# Patient Record
Sex: FEMALE | Race: WHITE | Employment: OTHER | URBAN - METROPOLITAN AREA
[De-identification: names, ages, dates, MRNs, and addresses within clinical notes are randomized per-mention and may not be internally consistent; named-entity substitution may affect disease eponyms.]

---

## 2017-01-01 ENCOUNTER — PATIENT OUTREACH (OUTPATIENT)
Dept: INTERNAL MEDICINE CLINIC | Age: 62
End: 2017-01-01

## 2017-01-01 ENCOUNTER — TELEPHONE (OUTPATIENT)
Dept: INTERNAL MEDICINE CLINIC | Age: 62
End: 2017-01-01

## 2017-01-01 DIAGNOSIS — Z93.1 S/P PERCUTANEOUS ENDOSCOPIC GASTROSTOMY (PEG) TUBE PLACEMENT (HCC): ICD-10-CM

## 2017-01-01 RX ORDER — CYCLOBENZAPRINE HCL 5 MG
5 TABLET ORAL
Qty: 25 TAB | Refills: 1 | Status: SHIPPED | OUTPATIENT
Start: 2017-01-01 | End: 2018-01-01 | Stop reason: SDUPTHER

## 2017-01-01 RX ORDER — POTASSIUM CHLORIDE 20MEQ/15ML
10 LIQUID (ML) ORAL DAILY
COMMUNITY
End: 2018-01-01 | Stop reason: ALTCHOICE

## 2017-01-01 RX ORDER — MORPHINE SULFATE ORAL SOLUTION 10 MG/5ML
SOLUTION ORAL
Qty: 100 ML | Refills: 0 | Status: SHIPPED | OUTPATIENT
Start: 2017-01-01 | End: 2018-01-01 | Stop reason: SDUPTHER

## 2017-01-12 DIAGNOSIS — Z93.1 S/P PERCUTANEOUS ENDOSCOPIC GASTROSTOMY (PEG) TUBE PLACEMENT (HCC): ICD-10-CM

## 2017-01-12 RX ORDER — CYCLOBENZAPRINE HCL 5 MG
5 TABLET ORAL
Qty: 25 TAB | Refills: 0 | Status: SHIPPED | OUTPATIENT
Start: 2017-01-12 | End: 2017-02-01 | Stop reason: SDUPTHER

## 2017-01-26 ENCOUNTER — OFFICE VISIT (OUTPATIENT)
Dept: VASCULAR SURGERY | Age: 62
End: 2017-01-26

## 2017-01-26 DIAGNOSIS — I77.1 CELIAC ARTERY STENOSIS (HCC): ICD-10-CM

## 2017-01-26 DIAGNOSIS — I77.9 BILATERAL CAROTID ARTERY DISEASE (HCC): ICD-10-CM

## 2017-01-26 NOTE — PROCEDURES
[facility]   *** FINAL REPORT ***    Name: Edita Kasper  MRN: [id number]       Outpatient  : 02 Dec 1955  HIS Order #: [id number]  [address] [facility] Visit #: [id number]  Date: 2017    TYPE OF TEST: Cerebrovascular Duplex    REASON FOR TEST  Carotid stenosis    Right Carotid:-             Proximal               Mid                 Distal  cm/s  Systolic  Diastolic  Systolic  Diastolic  Systolic  Diastolic  CCA:    594.5      45.0      112.0      28.0       79.0      26.0  Bulb:   104.0      37.0  ECA:  ICA:    110.0      38.0      273.0      94.0      373.0     106.0  ICA/CCA:  3.3       3.8    ICA Stenosis: 70% or greater    Right Vertebral:-  Finding: Occluded  Sys:  Hailey:    Right Subclavian:    Left Carotid:-            Proximal                Mid                 Distal  cm/s  Systolic  Diastolic  Systolic  Diastolic  Systolic  Diastolic  CCA:    971.6      31.0       75.0      23.0       93.0      29.0  Bulb:    96.0      34.0  ECA:     91.0      10.0  ICA:    110.0      43.0      158.0      64.0      311.0     119.0  ICA/CCA:  2.9       3.8    ICA Stenosis: 70% or greater    Left Vertebral:-  Finding: Antegrade  Sys:       61.0  Hailey:       19.0    Left Subclavian:    INTERPRETATION/FINDINGS  Duplex images were obtained using 2-D gray scale, color flow and  spectral doppler analysis. 1. Severe 70% or greater stenosis in the internal carotid arteries  bilaterally. 2. The right external carotid artery could not be visualized. 3. No significant stenosis in the left external carotid artery. 4. 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 5. Antegrade flow in the left vertebral artery. 6. The ICA/CCA ratios could be unreliable on the right due to elevated   velocities in the common carotid artery. Plaque Morphology:  1. Heterogeneous plaque in the bulb and right ICA. 2. Heterogeneous plaque in the bulb and left ICA.   Compared to the previous study on 16 there is progression of the  disease in the left ICA.    ADDITIONAL COMMENTS    I have personally reviewed the data relevant to the interpretation of  this  study. TECHNOLOGIST: Yuri Krueger RVT, ДМИТРИЙ  Signed: 01/26/2017 09:07 AM    PHYSICIAN: Monse Koehler D.O.   Signed: 01/27/2017 09:10 AM

## 2017-01-26 NOTE — PROCEDURES
[facility]   *** FINAL REPORT ***    Name: Noella Heimlich  MRN: [id number]       Outpatient  : 02 Dec 1955  HIS Order #: [id number]  [address] [facility] [place] Visit #: [id number]  Date: 2017    TYPE OF TEST: Visceral Arterial Duplex    REASON FOR TEST  Peripheral vascular dz NOS, Chronic mesenteric ischemia    Aortic PSV:  87.0 cm/s  Diameter AP:     cm   TV:     cm                   Right          Left  Renal Artery:- -------------  -------------  Proximal  PSV:  Mid       PSV:  Distal    PSV:  Aortic ratio :    Medullary PSV:            EDV:            EDR:            SDR:    Cortical  PSV:            EDV:            EDR:            SDR:  Stenosis:  Kidney size:        cm             cm               x      cm      x      cm    Hilar:-        Right          Left  Acc. Time  AT:     secs           secs  Acc. Index AI:             RI:    Mesenteric:-                  Prox   Mid   Dist Ratio Stenosis          Aneurysm                  ----- ----- ----- ----- ----------------- ------------  SMA:            387.0 251.0 179.0  4.4  Celiac:         557.0               6.4  Hepatic:        165.0               1.9  Splenic:  FIONA:  :    INTERPRETATION/FINDINGS  Duplex images were obtained using 2-D gray scale, color flow and  spectral doppler analysis. MESENTERIC:  1. Patent celiac artery stent with maximum velocity of 557c/s and no  visible narrowing or post stenotic flow. 2. Patent superior mesenteric artery with elevated velocities  throughout, no visible narrowing or post stenotic flow. 3. Patent hepatic artery at the origin. 4. The splenic and inferior mesenteric arteries were not visualized. 5. Compared to the previous study on 16 the velocities continue  to be elevated  with no significant stenosis. ADDITIONAL COMMENTS    I have personally reviewed the data relevant to the interpretation of  this  study. TECHNOLOGIST: Robin Sanabria RVT, TESSYMS  Signed: 2017 09:25 AM    PHYSICIAN: Estella Lundborg, LOBO  Signed: 01/27/2017 09:11 AM

## 2017-02-01 DIAGNOSIS — Z93.1 S/P PERCUTANEOUS ENDOSCOPIC GASTROSTOMY (PEG) TUBE PLACEMENT (HCC): ICD-10-CM

## 2017-02-01 RX ORDER — CYCLOBENZAPRINE HCL 5 MG
5 TABLET ORAL
Qty: 25 TAB | Refills: 0 | Status: SHIPPED | OUTPATIENT
Start: 2017-02-01 | End: 2017-02-24 | Stop reason: SDUPTHER

## 2017-02-06 ENCOUNTER — TELEPHONE (OUTPATIENT)
Dept: INTERNAL MEDICINE CLINIC | Age: 62
End: 2017-02-06

## 2017-02-06 NOTE — TELEPHONE ENCOUNTER
Call from pt's  req a refill on 2 Deejay HN, he is asking for 5 cans a day for 30 days , he said she normally gets 6 cans, pls send to [address] [place] [address]

## 2017-02-07 NOTE — TELEPHONE ENCOUNTER
Dr. Morgan Driscoll, they will need a written script for this to be faxed to [address].

## 2017-02-22 ENCOUNTER — TELEPHONE (OUTPATIENT)
Dept: INTERNAL MEDICINE CLINIC | Age: 62
End: 2017-02-22

## 2017-02-22 NOTE — TELEPHONE ENCOUNTER
Spoke with pt , she has an appt with a general surgeon on Friday. I told him to keep the appt with them as we would probably not be able to get her in sooner than that.

## 2017-02-22 NOTE — TELEPHONE ENCOUNTER
calling. Says wife has had a feeding tube for several years. Says it is leaking around the incision area. Wants to know what kind of doctor would take care of this?      They went to er yesterday and they looked at it but it is still leaking.,

## 2017-02-24 DIAGNOSIS — Z93.1 S/P PERCUTANEOUS ENDOSCOPIC GASTROSTOMY (PEG) TUBE PLACEMENT (HCC): ICD-10-CM

## 2017-02-24 RX ORDER — CYCLOBENZAPRINE HCL 5 MG
5 TABLET ORAL
Qty: 25 TAB | Refills: 0 | Status: SHIPPED | OUTPATIENT
Start: 2017-02-24 | End: 2017-03-22 | Stop reason: SDUPTHER

## 2017-02-24 NOTE — TELEPHONE ENCOUNTER
Patient called for   Requested Prescriptions     Pending Prescriptions Disp Refills    cyclobenzaprine (FLEXERIL) 5 mg tablet 25 Tab 0     Sig: Take 1 Tab by mouth three (3) times daily as needed for Muscle Spasm(s). Pharmacy confirmed.

## 2017-02-27 ENCOUNTER — TELEPHONE (OUTPATIENT)
Dept: INTERNAL MEDICINE CLINIC | Age: 62
End: 2017-02-27

## 2017-02-27 ENCOUNTER — HOSPITAL ENCOUNTER (OUTPATIENT)
Dept: LAB | Age: 62
Discharge: HOME | End: 2017-02-27
Payer: MEDICARE

## 2017-02-27 DIAGNOSIS — E55.9 VITAMIN D DEFICIENCY: ICD-10-CM

## 2017-02-27 DIAGNOSIS — R79.89 BLOOD CREATININE INCREASED COMPARED WITH PRIOR MEASUREMENT: ICD-10-CM

## 2017-02-27 DIAGNOSIS — E03.9 ACQUIRED HYPOTHYROIDISM: ICD-10-CM

## 2017-02-27 DIAGNOSIS — C10.9 SQUAMOUS CELL CARCINOMA OF OROPHARYNX (HCC): ICD-10-CM

## 2017-02-27 DIAGNOSIS — E83.52 HYPERCALCEMIA: ICD-10-CM

## 2017-02-27 LAB
ANION GAP BLD CALC-SCNC: 11 MMOL/L (ref 3–18)
APPEARANCE UR: CLEAR
BACTERIA URNS QL MICRO: NEGATIVE /HPF
BASOPHILS # BLD AUTO: 0.1 K/UL (ref 0–0.06)
BASOPHILS # BLD: 2 % (ref 0–2)
BILIRUB UR QL: NEGATIVE
BUN SERPL-MCNC: 10 MG/DL (ref 7–18)
BUN/CREAT SERPL: 12 (ref 12–20)
CALCIUM SERPL-MCNC: 10 MG/DL (ref 8.5–10.1)
CALCIUM SERPL-MCNC: 10.7 MG/DL (ref 8.5–10.1)
CHLORIDE SERPL-SCNC: 94 MMOL/L (ref 100–108)
CO2 SERPL-SCNC: 26 MMOL/L (ref 21–32)
COLOR UR: YELLOW
CREAT SERPL-MCNC: 0.81 MG/DL (ref 0.6–1.3)
DIFFERENTIAL METHOD BLD: ABNORMAL
EOSINOPHIL # BLD: 0.1 K/UL (ref 0–0.4)
EOSINOPHIL NFR BLD: 1 % (ref 0–5)
EPITH CASTS URNS QL MICRO: ABNORMAL /LPF (ref 0–5)
ERYTHROCYTE [DISTWIDTH] IN BLOOD BY AUTOMATED COUNT: 13.8 % (ref 11.6–14.5)
GLUCOSE SERPL-MCNC: 81 MG/DL (ref 74–99)
GLUCOSE UR STRIP.AUTO-MCNC: NEGATIVE MG/DL
HCT VFR BLD AUTO: 41.5 % (ref 35–45)
HGB BLD-MCNC: 13.1 G/DL (ref 12–16)
HGB UR QL STRIP: NEGATIVE
IRON SATN MFR SERPL: 14 %
IRON SERPL-MCNC: 54 UG/DL (ref 50–175)
KETONES UR QL STRIP.AUTO: NEGATIVE MG/DL
LEUKOCYTE ESTERASE UR QL STRIP.AUTO: NEGATIVE
LYMPHOCYTES # BLD AUTO: 4 % (ref 21–52)
LYMPHOCYTES # BLD: 0.3 K/UL (ref 0.9–3.6)
MCH RBC QN AUTO: 31.5 PG (ref 24–34)
MCHC RBC AUTO-ENTMCNC: 31.6 G/DL (ref 31–37)
MCV RBC AUTO: 99.8 FL (ref 74–97)
MONOCYTES # BLD: 0.9 K/UL (ref 0.05–1.2)
MONOCYTES NFR BLD AUTO: 13 % (ref 3–10)
NEUTS SEG # BLD: 5.8 K/UL (ref 1.8–8)
NEUTS SEG NFR BLD AUTO: 80 % (ref 40–73)
NITRITE UR QL STRIP.AUTO: NEGATIVE
PH UR STRIP: 5 [PH] (ref 5–8)
PLATELET # BLD AUTO: 380 K/UL (ref 135–420)
PMV BLD AUTO: 11.3 FL (ref 9.2–11.8)
POTASSIUM SERPL-SCNC: 4.7 MMOL/L (ref 3.5–5.5)
PROT UR STRIP-MCNC: NEGATIVE MG/DL
PTH-INTACT SERPL-MCNC: 30 PG/ML (ref 14–72)
RBC # BLD AUTO: 4.16 M/UL (ref 4.2–5.3)
RBC #/AREA URNS HPF: 0 /HPF (ref 0–5)
SODIUM SERPL-SCNC: 131 MMOL/L (ref 136–145)
SP GR UR REFRACTOMETRY: 1.03 (ref 1–1.03)
T4 FREE SERPL-MCNC: 1 NG/DL (ref 0.7–1.5)
TIBC SERPL-MCNC: 382 UG/DL (ref 250–450)
TSH SERPL DL<=0.05 MIU/L-ACNC: 0.94 UIU/ML (ref 0.36–3.74)
UROBILINOGEN UR QL STRIP.AUTO: 0.2 EU/DL (ref 0.2–1)
WBC # BLD AUTO: 7.2 K/UL (ref 4.6–13.2)
WBC URNS QL MICRO: ABNORMAL /HPF (ref 0–4)

## 2017-02-27 PROCEDURE — 80048 BASIC METABOLIC PNL TOTAL CA: CPT | Performed by: INTERNAL MEDICINE

## 2017-02-27 PROCEDURE — 83540 ASSAY OF IRON: CPT | Performed by: INTERNAL MEDICINE

## 2017-02-27 PROCEDURE — 36415 COLL VENOUS BLD VENIPUNCTURE: CPT | Performed by: INTERNAL MEDICINE

## 2017-02-27 PROCEDURE — 83970 ASSAY OF PARATHORMONE: CPT | Performed by: INTERNAL MEDICINE

## 2017-02-27 PROCEDURE — 81001 URINALYSIS AUTO W/SCOPE: CPT | Performed by: INTERNAL MEDICINE

## 2017-02-27 PROCEDURE — 84439 ASSAY OF FREE THYROXINE: CPT | Performed by: INTERNAL MEDICINE

## 2017-02-27 PROCEDURE — 82306 VITAMIN D 25 HYDROXY: CPT | Performed by: INTERNAL MEDICINE

## 2017-02-27 PROCEDURE — 85025 COMPLETE CBC W/AUTO DIFF WBC: CPT | Performed by: INTERNAL MEDICINE

## 2017-02-27 PROCEDURE — 84443 ASSAY THYROID STIM HORMONE: CPT | Performed by: INTERNAL MEDICINE

## 2017-02-27 NOTE — TELEPHONE ENCOUNTER
Patient hasn't had labs drawn for her appt with Dr Sonia Holland on Thursday. Please call to schedule.

## 2017-02-28 LAB — 25(OH)D3 SERPL-MCNC: 41.5 NG/ML (ref 30–100)

## 2017-03-02 ENCOUNTER — OFFICE VISIT (OUTPATIENT)
Dept: INTERNAL MEDICINE CLINIC | Age: 62
End: 2017-03-02

## 2017-03-02 VITALS
DIASTOLIC BLOOD PRESSURE: 82 MMHG | HEIGHT: 62 IN | OXYGEN SATURATION: 99 % | HEART RATE: 116 BPM | BODY MASS INDEX: 16.56 KG/M2 | WEIGHT: 90 LBS | TEMPERATURE: 98.3 F | SYSTOLIC BLOOD PRESSURE: 154 MMHG

## 2017-03-02 DIAGNOSIS — F17.200 CURRENT SMOKER: ICD-10-CM

## 2017-03-02 DIAGNOSIS — I77.9 BILATERAL CAROTID ARTERY DISEASE (HCC): ICD-10-CM

## 2017-03-02 DIAGNOSIS — E87.1 HYPONATREMIA: ICD-10-CM

## 2017-03-02 DIAGNOSIS — C10.9 SQUAMOUS CELL CARCINOMA OF OROPHARYNX (HCC): Primary | ICD-10-CM

## 2017-03-02 DIAGNOSIS — Z93.1 S/P PERCUTANEOUS ENDOSCOPIC GASTROSTOMY (PEG) TUBE PLACEMENT (HCC): ICD-10-CM

## 2017-03-02 DIAGNOSIS — I10 ESSENTIAL HYPERTENSION: ICD-10-CM

## 2017-03-02 DIAGNOSIS — K22.2 ESOPHAGEAL STRICTURE: ICD-10-CM

## 2017-03-02 DIAGNOSIS — M85.80 OSTEOPENIA: ICD-10-CM

## 2017-03-02 DIAGNOSIS — Z00.00 MEDICARE ANNUAL WELLNESS VISIT, SUBSEQUENT: ICD-10-CM

## 2017-03-02 DIAGNOSIS — J44.9 CHRONIC OBSTRUCTIVE PULMONARY DISEASE, UNSPECIFIED COPD TYPE (HCC): ICD-10-CM

## 2017-03-02 DIAGNOSIS — I77.1 CELIAC ARTERY STENOSIS (HCC): ICD-10-CM

## 2017-03-02 DIAGNOSIS — E83.52 HYPERCALCEMIA: ICD-10-CM

## 2017-03-02 DIAGNOSIS — M85.9 DISORDER OF BONE DENSITY AND STRUCTURE, UNSPECIFIED: ICD-10-CM

## 2017-03-02 DIAGNOSIS — E43 SEVERE PROTEIN-CALORIE MALNUTRITION (HCC): ICD-10-CM

## 2017-03-02 DIAGNOSIS — E03.9 ACQUIRED HYPOTHYROIDISM: ICD-10-CM

## 2017-03-02 DIAGNOSIS — Z71.89 ADVANCE DIRECTIVE DISCUSSED WITH PATIENT: ICD-10-CM

## 2017-03-02 DIAGNOSIS — R05.8 COUGH PRODUCTIVE OF PURULENT SPUTUM: ICD-10-CM

## 2017-03-02 DIAGNOSIS — R91.8 MULTIPLE PULMONARY NODULES: ICD-10-CM

## 2017-03-02 NOTE — PROGRESS NOTES
1. Have you been to the ER, urgent care clinic or hospitalized since your last visit? YES. [facility]- feeding tube    2. Have you seen or consulted any other health care providers outside of the [address] [place] [address] since your last visit (Include any pap smears or colon screening)? YES  Dr. Lisa Cameron, Dr. Ese Benz    Do you have an Advanced Directive? NO    Would you like information on Advanced Directives?  NO

## 2017-03-02 NOTE — PATIENT INSTRUCTIONS
Medicare Part B Preventive Services Limitations Recommendation Scheduled   Bone Mass Measurement  (age 72 & older, biennial) Requires diagnosis related to osteoporosis or estrogen deficiency. Biennial benefit unless patient has history of long-term glucocorticoid tx or baseline is needed because initial test was by other method Every 2 years or more frequently if medically necessary. Ordered       Cardiovascular Screening Blood Tests (every 5 years)  Total cholesterol, HDL, Triglycerides Order as a panel if possible Every 5 years. Done:  11/10/2015         Colorectal Cancer Screening  -Fecal occult blood test (annual)  -Flexible sigmoidoscopy (5y)  -Screening colonoscopy (10y)  -Barium Enema Colorectal cancer screening, ages 54-65. For all patients 48 and older:  -Annual fecal occult blood test or  colonoscopy every 10 years or  -Flexible sigmoidoscopy every 5 years or  -lower endoscopy to be performed more frequently, if advised by GI. Done:  1/10/2013         Counseling to Prevent Tobacco Use (up to 8 sessions per year)  - Counseling greater than 3 and up to 10 minutes  - Counseling greater than 10 minutes Patients must be asymptomatic of tobacco-related conditions to receive as preventive service Two cessation counseling attempts (up to 8 counseling sessions) per year. Current smoker   Diabetes Screening Tests (at least every 3 years, Medicare covers annually or at 6-month intervals for prediabetic patients)    Fasting blood sugar (FBS) or glucose tolerance test (GTT) Patient must be diagnosed with one of the following:  -Hypertension, Dyslipidemia, obesity, previous impaired FBS or GTT  Or any two of the following: overweight, FH of diabetes, age ? 72, history of gestational diabetes, birth of baby weighing more than 9 pounds Annually or every 6 months if previous diagnosis of elevated FBS, elevated HbA1c, or impaired GTT, or glucosuria.  Done:  11/10/2015         Diabetes Self-Management Training (DSMT) (no USPSTF recommendation) Requires referral by treating physician for patient with diabetes or renal disease. 10 hours of initial DSMT session of no less than 30 minutes each in a continuous 12-month period. 2 hours of follow-up DSMT in subsequent years. Up to 10 hours of initial training within a continuous 12 month period of subsequent years: up to 2 hours of follow-up training each year after the initial year. N/A   Glaucoma Screening (no USPSTF recommendation) Diabetes mellitus, family history, , age 48 or over,  American, age 72 or over Annually for covered beneficiaries. N/A       Human Immunodeficiency Virus (HIV) Screening (annually for increased risk patients)  HIV-1 and HIV-2 by EIA, SHANNAN, rapid antibody test, or oral mucosa transudate Patient must be at increased risk for HIV infection per USPSTF guidelines or pregnant. Tests covered annually for patients at increased risk. Pregnant patients may receive up to 3 test during pregnancy. Annually for beneficiaries at increased risk, including anyone who asks for the test. Not high risk   Medical Nutrition Therapy (MNT) (for diabetes or renal disease not recommended schedule) Requires referral by treating physician for patient with diabetes or renal disease. Can be provided in same year as diabetes self-management training (DSMT), and CMS recommends medical nutrition therapy take place after DSMT. Up to 3 hours for initial year and 2 hours in subsequent years. First year: 3 hours of one-on-one counseling or subsequent years: 2 hours.  N/A   Prostate Cancer Screening (annually up to age 76)  - Digital rectal exam (KAMALJIT)  - Prostate specific antigen (PSA) Annually (age 48 or over), KAMALJIT not paid separately when covered E/M service is provided on same date Once every 12 months for patients age older than 48years of age includes: digital rectal exam and/or prostate specific antigen test. N/A   Seasonal Influenza Vaccination (annually) Once per fall or winter season. Done:  10/13/2016         Pneumococcal Vaccination (once after 72)  Once after age 72 and if more than 5 years since last vaccination and/or uncertainty of vaccine status. Pneumococcal:  1/22/2016    Prevnar 13:  10/13/2016   Hepatitis B Vaccinations (if medium/high risk) Medium/high risk factors:  End-stage renal disease,  Hemophiliacs who received Factor VIII or IX concentrates, Clients of institutions for the mentally retarded, Persons who live in the same house as a HepB virus carrier, Homosexual men, Illicit injectable drug abusers. Schedule course of vaccines if patient not previously vaccinated  *additional shots if medically necessary. Not high risk   Screening Mammography (biennial age 54-69)? Annually (age 36 or over) Age 28 through 44: one baseline or aged 36 and older: annually. Done:  10/13/2016         Screening Pap Tests and Pelvic Examination (up to age 79 and after 79 if unknown history or abnormal study last 10 years) Every 24 months except high risk Annually if at high risk for developing cervical or vaginal cancer, or childbearing, age with abnormal Pap test within past 3 years or every 2 years for women at normal risk. Deferred to next visit. Ultrasound Screening for Abdominal Aortic Aneurysm (AAA) (once) Patient must be referred through IPPE and not have had a screening for abdominal aortic aneurysm before under Medicare. Limited to patients who meet one of the following criteria:  - Men who are 73-68 years old and have smoked more than 100 cigarettes in their lifetime.  -Anyone with a FH of AAA  -Anyone recommended for screening by USPSTF Once in a lifetime. N/A           Schedule of Personalized Health Plan  (Provide Copy to Patient)  The best way to stay healthy is to live a healthy lifestyle. A healthy lifestyle includes regular exercise, eating a well-balanced diet, keeping a healthy weight and not smoking.     Regular physical exams and screening tests are another important way to take care of yourself. Preventive exams provided by health care providers can find health problems early when treatment works best and can keep you from getting certain diseases or illnesses. Preventive services include exams, lab tests, screenings, shots, monitoring and information to help you take care of your own health. All people over 65 should have a pneumonia shot. Pneumonia shots are usually only needed once in a lifetime unless your doctor decides differently. All people over 65 should have a yearly flu shot. People over 65 are at medium to high risk for Hepatitis B. Three shots are needed for complete protection. In addition to your physical exam, some screening tests are recommended:    Bone mass measurement (dexa scan) is recommended every two years  Diabetes Mellitus screening is recommended every year. Glaucoma is an eye disease caused by high pressure in the eye. An eye exam is recommended every year. Cardiovascular screening tests that check your cholesterol and other blood fat (lipid) levels are recommended every five years. Colorectal Cancer screening tests help to find pre-cancerous polyps (growths in the colon) so they can be removed before they turn into cancer. Tests ordered for screening depend on your personal and family history risk factors.     Screening for Breast Cancer is recommended yearly with a mammogram.    Screening for Cervical Cancer is recommended every two years (annually for certain risk factors, such as previous history of STD or abnormal PAP in past 7 years), with a Pelvic Exam with PAP    Here is a list of your current Health Maintenance items with a due date:  Health Maintenance   Topic Date Due    DTaP/Tdap/Td series (1 - Tdap) 04/07/2017 (Originally 12/2/1978)    Pneumococcal 19-64 Highest Risk (3 of 3 - PCV13) 10/13/2017    MEDICARE YEARLY EXAM  03/03/2018    BREAST CANCER SCRN MAMMOGRAM  10/13/2018    PAP AKA CERVICAL CYTOLOGY  11/17/2018    COLONOSCOPY  01/10/2023    Hepatitis C Screening  Addressed    INFLUENZA AGE 9 TO ADULT  Completed          Preventing Falls: Care Instructions  Your Care Instructions  Getting around your home safely can be a challenge if you have injuries or health problems that make it easy for you to fall. Loose rugs and furniture in walkways are among the dangers for many older people who have problems walking or who have poor eyesight. People who have conditions such as arthritis, osteoporosis, or dementia also have to be careful not to fall. You can make your home safer with a few simple measures. Follow-up care is a key part of your treatment and safety. Be sure to make and go to all appointments, and call your doctor if you are having problems. It's also a good idea to know your test results and keep a list of the medicines you take. How can you care for yourself at home? Taking care of yourself  · You may get dizzy if you do not drink enough water. To prevent dehydration, drink plenty of fluids, enough so that your urine is light yellow or clear like water. Choose water and other caffeine-free clear liquids. If you have kidney, heart, or liver disease and have to limit fluids, talk with your doctor before you increase the amount of fluids you drink. · Exercise regularly to improve your strength, muscle tone, and balance. Walk if you can. Swimming may be a good choice if you cannot walk easily. · Have your vision and hearing checked each year or any time you notice a change. If you have trouble seeing and hearing, you might not be able to avoid objects and could lose your balance. · Know the side effects of the medicines you take. Ask your doctor or pharmacist whether the medicines you take can affect your balance. Sleeping pills or sedatives can affect your balance. · Limit the amount of alcohol you drink. Alcohol can impair your balance and other senses.   · Ask your doctor whether calluses or corns on your feet need to be removed. If you wear loose-fitting shoes because of calluses or corns, you can lose your balance and fall. · Talk to your doctor if you have numbness in your feet. Preventing falls at home  · Remove raised doorway thresholds, throw rugs, and clutter. Repair loose carpet or raised areas in the floor. · Move furniture and electrical cords to keep them out of walking paths. · Use nonskid floor wax, and wipe up spills right away, especially on ceramic tile floors. · If you use a walker or cane, put rubber tips on it. If you use crutches, clean the bottoms of them regularly with an abrasive pad, such as steel wool. · Keep your house well lit, especially [place], and outside walkways. Use night-lights in areas such as hallways and bathrooms. Add extra light switches or use remote switches (such as switches that go on or off when you clap your hands) to make it easier to turn lights on if you have to get up during the night. · Install sturdy handrails on stairways. · Move items in your cabinets so that the things you use a lot are on the lower shelves (about waist level). · Keep a cordless phone and a flashlight with new batteries by your bed. If possible, put a phone in each of the main rooms of your house, or carry a cell phone in case you fall and cannot reach a phone. Or, you can wear a device around your neck or wrist. You push a button that sends a signal for help. · Wear low-heeled shoes that fit well and give your feet good support. Use footwear with nonskid soles. Check the heels and soles of your shoes for wear. Repair or replace worn heels or soles. · Do not wear socks without shoes on wood floors. · Walk on the grass when the sidewalks are slippery. If you live in an area that gets snow and ice in the winter, sprinkle salt on slippery steps and sidewalks.   Preventing falls in the bath  · Install grab bars and nonskid mats inside and outside your shower or tub and near the toilet and sinks. · Use shower chairs and bath benches. · Use a hand-held shower head that will allow you to sit while showering. · Get into a tub or shower by putting the weaker leg in first. Get out of a tub or shower with your strong side first.  · Repair loose toilet seats and consider installing a raised toilet seat to make getting on and off the toilet easier. · Keep your bathroom door unlocked while you are in the shower. Where can you learn more? Go to http://dayronFastclickraj.info/. Enter [id number] in the search box to learn more about \"Preventing Falls: Care Instructions. \"  Current as of: August 4, 2016  Content Version: 11.1  © 8875-9660 [employer]. Care instructions adapted under license by [employer] (which disclaims liability or warranty for this information). If you have questions about a medical condition or this instruction, always ask your healthcare professional. [place] [address] any warranty or liability for your use of this information. Advance Directives: Care Instructions  Your Care Instructions  An advance directive is a legal way to state your wishes at the end of your life. It tells your family and your doctor what to do if you can no longer say what you want. There are two main types of advance directives. You can change them any time that your wishes change. · A living will tells your family and your doctor your wishes about life support and other treatment. · A medical power of  lets you name a person to make treatment decisions for you when you can't speak for yourself. This person is called a health care agent. If you do not have an advance directive, decisions about your medical care may be made by a doctor or a  who doesn't know you. It may help to think of an advance directive as a gift to the people who care for you.  If you have one, they won't have to make tough decisions by themselves. Follow-up care is a key part of your treatment and safety. Be sure to make and go to all appointments, and call your doctor if you are having problems. It's also a good idea to know your test results and keep a list of the medicines you take. How can you care for yourself at home? · Discuss your wishes with your loved ones and your doctor. This way, there are no surprises. · Many states have a unique form. Or you might use a universal form that has been approved by many states. This kind of form can sometimes be completed and stored online. Your electronic copy will then be available wherever you have a connection to the Internet. In most cases, doctors will respect your wishes even if you have a form from a different state. · You don't need a  to do an advance directive. But you may want to get legal advice. · Think about these questions when you prepare an advance directive:  ¨ Who do you want to make decisions about your medical care if you are not able to? Many people choose a family member, close friend, or doctor. ¨ Do you know enough about life support methods that might be used? If not, talk to your doctor so you understand. ¨ What are you most afraid of that might happen? You might be afraid of having pain, losing your independence, or being kept alive by machines. ¨ Where would you prefer to die? Choices include your home, a hospital, or a nursing home. ¨ Would you like to have information about hospice care to support you and your family? ¨ Do you want to donate organs when you die? ¨ Do you want certain [employer] practices performed before you die? If so, put your wishes in the advance directive. · Read your advance directive every year, and make changes as needed. When should you call for help? Be sure to contact your doctor if you have any questions. Where can you learn more? Go to http://dayron-raj.info/.   Enter R264 in the search box to learn more about \"Advance Directives: Care Instructions. \"  Current as of: February 24, 2016  Content Version: 11.1  © 9176-3149 [employer]. Care instructions adapted under license by [employer] (which disclaims liability or warranty for this information). If you have questions about a medical condition or this instruction, always ask your healthcare professional. [place] [address] any warranty or liability for your use of this information.

## 2017-03-02 NOTE — PROGRESS NOTES
Brandi Montemayor is a 63 y.o. female and presents for annual Medicare Wellness Visit. Patient accompanied by your , Rachele Callejas. Problem List: Reviewed with patient and discussed risk factors.     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    Anemia D64.9    Esophageal stricture K22.2    Vitamin D deficiency E55.9    Acquired hypothyroidism E03.9    Hyponatremia E87.1    Carpal tunnel syndrome, left G56.02    GI bleed K92.2    Dysphagia, 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    S/P percutaneous endoscopic gastrostomy (PEG) tube placement ([facility]) Z93.1    History of radiation to head and neck region Z92.3    Cough productive of purulent sputum R05    Hypercalcemia E83.52    Elevated serum creatinine R79.89    Current smoker F17.200       Current medical providers:  Patient Care Team:  Remington Quiroz MD as PCP - General (Internal Medicine)  Pema Celeste MD (Inactive) (Orthopedic Surgery)  Josie Haney MD (Gastroenterology)  Dominick Burgos MD (Vascular Surgery)  Guilherme Bower MD (Otolaryngology)  Molly Alicea PA-C (Physician Assistant)  Sandy Mccray MD (Gastroenterology)  [place], [place] (Vascular Surgery)  Albertina Conroy MD (Otolaryngology)  Suman Delong MD (Neurology)  Gris Holm RN as [address] (Internal Medicine)  Javid Thomas MD (Oncology)  Mallory Jasmine MD (Gastroenterology)  Jonathon Cantu MD (Surgery)  Gem Rowe MD (Ophthalmology)  Eleanor Gallagher MD (Ophthalmology)  Esau Palumbo, RN as Ambulatory Care Navigator (Internal Medicine)    PSH: Reviewed with patient  Past Surgical History:   Procedure Laterality Date    ABDOMEN SURGERY PROC UNLISTED      \"ulcer\" surgery    HX ENDOSCOPY  5/9/2014    w/ dilation    HX ENDOSCOPY  5/13/2014    w/ dilation    HX ENDOSCOPY  5/21    with Dilatation    HX ENDOSCOPY  6/4/2014    w/ dilation    HX GI      HX ORTHOPAEDIC      intramedullary chao fixation of the right tiba    HX OTHER SURGICAL      skin cancer removal from right shoulder; PEG TUBE    HX TRACHEOSTOMY          SH: Reviewed with patient  Social History   Substance Use Topics    Smoking status: Current Every Day Smoker     Packs/day: 0.50     Types: Cigarettes    Smokeless tobacco: Never Used      Comment: DENIES BUT HEAVY ODOR NOTED ON PT. THEN SPOUSE STATES OCCASSIONAL    Alcohol use 8.4 oz/week     0 Standard drinks or equivalent, 14 Cans of beer per week       FH: Reviewed with patient  Family History   Problem Relation Age of Onset    Heart Disease Mother        Medications/Allergies: Reviewed with patient  Current Outpatient Prescriptions on File Prior to Visit   Medication Sig Dispense Refill    cyclobenzaprine (FLEXERIL) 5 mg tablet Take 1 Tab by mouth three (3) times daily as needed for Muscle Spasm(s). 25 Tab 0    morphine 10 mg/5 mL oral solution 2.5ml or 5ml by peg tube every 8 hours as needed for pain 250 mL 0    aspirin 81 mg chewable tablet Take 81 mg by mouth daily.  albuterol-ipratropium (DUO-NEB) 2.5 mg-0.5 mg/3 ml nebu 3 mL by Nebulization route every four (4) hours as needed. 30 Nebule 5    levothyroxine (SYNTHROID) 25 mcg tablet Take 1.5 tablet daily 30 minutes before breakfast and medications. (Patient taking differently: 37.5 mcg. Take 1.5 tablet daily 30 minutes before breakfast and medications.) 135 Tab 5    gabapentin (NEURONTIN) 300 mg capsule Take 300 mg by mouth three (3) times daily.  diphenhydrAMINE (BENADRYL) 25 mg capsule Take 25 mg by mouth daily as needed for Itching (Taken prior to Plavix dose).       OMEPRAZOLE PO Take 10 mg by mouth two (2) times a day.  cyanocobalamin (VITAMIN B12) 500 mcg tablet Take 500 mcg by mouth daily. Via peg      lidocaine HCl-hydrocortison ac topical cream Apply  to affected area two (2) times a day.  multivitamin (THERAGRAN) liquid 10 mL daily.  potassium chloride (K-DUR, KLOR-CON) 10 mEq tablet 10 mEq by Feeding Tube route daily.  cholecalciferol, VITAMIN D3, (VITAMIN D3) 5,000 unit tab tablet Take 2,000 Units by mouth daily.  amylase-lipase-protease (CREON 86228) 12,000-38,000 -60,000 unit capsule Instill 1 Cap into tube Take As Needed (for clogged feeding tube).  fluticasone 250 mcg/actuation dsdv 1 [place].  OXYGEN-AIR DELIVERY SYSTEMS 2 L/min by Nasal route as needed. No current facility-administered medications on file prior to visit. Allergies   Allergen Reactions    Percocet [Oxycodone-Acetaminophen] Itching and Hives    Percodan [Oxycodone-Aspirin] Rash and Itching    Plavix [Clopidogrel] Rash and Hives       Objective:  Visit Vitals    /82    Pulse (!) 116    Temp 98.3 °F (36.8 °C) (Oral)    Ht 5' 2\" (1.575 m)    Wt 90 lb (40.8 kg)    SpO2 99%    BMI 16.46 kg/m2    Body mass index is 16.46 kg/(m^2). Assessment of cognitive impairment: Alert and oriented x 3    Depression Screen:   PHQ 2 / 9, over the last two weeks 3/2/2017   Little interest or pleasure in doing things Not at all   Feeling down, depressed or hopeless Not at all   Total Score PHQ 2 0       Fall Risk Assessment:  No flowsheet data found. Functional Ability:   Does the patient exhibit a steady gait? yes   How long did it take the patient to get up and walk from a sitting position? 1 second. Is the patient self reliant?  (ie can do own laundry, meals, household chores)  yes     Does the patient handle his/her own medications? yes     Does the patient handle his/her own money? yes     Is the patients home safe (ie good lighting, handrails on stairs and bath, etc.)? yes     Did you notice or did patient express any hearing difficulties? no     Did you notice or did patient express any vision difficulties?   no     Were distance and reading eye charts used? no       Advance Care Planning:   Patient was offered the opportunity to discuss advance care planning:  yes     Does patient have an Advance Directive:  no   If no, did you provide information on [facility]? yes       Plan:      Orders Placed This Encounter    DEXA BONE DENSITY STUDY AXIAL    diph,Pertuss,Acell,,Tet Vac-PF (ADACEL) 2 Lf-(2.5-5-3-5 mcg)-5Lf/0.5 mL susp       Health Maintenance   Topic Date Due    DTaP/Tdap/Td series (1 - Tdap) 04/07/2017 (Originally 12/2/1976)    Pneumococcal 19-64 Highest Risk (3 of 3 - PCV13) 10/13/2017    MEDICARE YEARLY EXAM  03/03/2018    BREAST CANCER SCRN MAMMOGRAM  10/13/2018    PAP AKA CERVICAL CYTOLOGY  11/17/2018    COLONOSCOPY  01/10/2023    Hepatitis C Screening  Addressed    INFLUENZA AGE 9 TO ADULT  Completed       *Patient verbalized understanding and agreement with the plan. A copy of the After Visit Summary with personalized health plan was given to the patient today.

## 2017-03-02 NOTE — MR AVS SNAPSHOT
Visit Information Date & Time Provider Department Dept. Phone Encounter #  
 3/2/2017 10:00 AM Andrew Dasilva MD Internist of [address] [place] [address] [phone] [id number] Follow-up Instructions Return in about 6 months (around 9/2/2017), or if symptoms worsen or fail to improve. Your Appointments 3/24/2017 10:45 AM  
Office Visit with MD J[facility] ([facility]) Appt Note: 6 MONTH FU AFTER STUDY AT [facility] ON 1/26/2017; office resched due to 9 am appt is for procedure moved appt down; 6 [facility] ON 1/26/2017 office resched due to 9 am appt is for procedure moved appt down; .  
 [address] [facility] [address] [place] [address] [address] [address]  
[phone]  
  
   
 [address] [address]  
  
    
 8/29/2017  9:35 AM  
LAB with [facility] NURSE VISIT Internist of [facility] ([facility]) Appt Note: lab  
 [address], [address] [address] [address]  
  
   
 [address], [address]  
  
    
 9/5/2017 10:00 AM  
Office Visit with Andrew Dasilva MD  
Internist of [address] [place] [address]) Appt Note: ov bs  
 [address] [facility], [address] [facility] [address] [address] [address] [facility]  
  
   
 [address], [address] Upcoming Health Maintenance Date Due DTaP/Tdap/Td series (1 - Tdap) 4/7/2017* Pneumococcal 19-64 Highest Risk (3 of 3 - PCV13) 10/13/2017 MEDICARE YEARLY EXAM 3/3/2018 BREAST CANCER SCRN MAMMOGRAM 10/13/2018 PAP AKA CERVICAL CYTOLOGY 11/17/2018 COLONOSCOPY 1/10/2023 *Topic was postponed. The date shown is not the original due date. Allergies as of 3/2/2017  Review Complete On: 3/2/2017 By: Ileene Nipple, LPN Severity Noted Reaction Type Reactions Percocet [Oxycodone-acetaminophen]    Itching, Hives Percodan [Oxycodone-aspirin]  05/21/2014    Rash, Itching Plavix [Clopidogrel]  04/10/2013    Rash, Hives Current Immunizations  Reviewed on 10/13/2016 Name Date Influenza Vaccine (Quad) PF 10/13/2016  3:26 PM, 11/17/2015  3:55 PM  
 Influenza Vaccine PF 11/4/2014, 10/21/2013  1:03 PM  
 Pneumococcal Polysaccharide (PPSV-23) 10/13/2016  3:52 PM  
  
 Not reviewed this visit You Were Diagnosed With   
  
 Codes Comments Medicare annual wellness visit, subsequent    -  Primary ICD-10-CM: Z00.00 ICD-9-CM: V70.0 Advance directive discussed with patient     ICD-10-CM: Z71.89 ICD-9-CM: V65.49 Osteopenia     ICD-10-CM: M85.80 ICD-9-CM: 733.90 Disorder of bone density and structure, unspecified     ICD-10-CM: M85.9 ICD-9-CM: 733.90 Vitals BP  
  
  
  
  
  
 154/82 BMI and BSA Data Body Mass Index Body Surface Area  
 16.46 kg/m 2 1.34 m 2 Preferred Pharmacy Pharmacy Name Phone [address] [place] [address] [phone] Your Updated Medication List  
  
   
This list is accurate as of: 3/2/17 11:02 AM.  Always use your most recent med list.  
  
  
  
  
 albuterol-ipratropium 2.5 mg-0.5 mg/3 ml Nebu Commonly known as:  DUO-NEB  
3 mL by Nebulization route every four (4) hours as needed. amylase-lipase-protease 12,000-38,000 -60,000 unit capsule Commonly known as:  CREON 04882 Instill 1 Cap into tube Take As Needed (for clogged feeding tube). aspirin 81 mg chewable tablet Take 81 mg by mouth daily. cholecalciferol (VITAMIN D3) 5,000 unit Tab tablet Commonly known as:  VITAMIN D3 Take 2,000 Units by mouth daily. cyanocobalamin 500 mcg tablet Commonly known as:  VITAMIN B12 Take 500 mcg by mouth daily. Via peg  
  
 cyclobenzaprine 5 mg tablet Commonly known as:  FLEXERIL Take 1 Tab by mouth three (3) times daily as needed for Muscle Spasm(s). diph,Pertuss(Acell),Tet Vac-PF 2 Lf-(2.5-5-3-5 mcg)-5Lf/0.5 mL susp Commonly known as:  ADACEL  
0.5 mL by IntraMUSCular route once for 1 dose. diphenhydrAMINE 25 mg capsule Commonly known as:  BENADRYL Take 25 mg by mouth daily as needed for Itching (Taken prior to Plavix dose). fluticasone 250 mcg/actuation Dsdv 1 Spray.  
  
 gabapentin 300 mg capsule Commonly known as:  NEURONTIN Take 300 mg by mouth three (3) times daily. levothyroxine 25 mcg tablet Commonly known as:  synthroid Take 1.5 tablet daily 30 minutes before breakfast and medications. lidocaine HCl-hydrocortison ac topical cream  
Apply  to affected area two (2) times a day. morphine 10 mg/5 mL oral solution 2.5ml or 5ml by peg tube every 8 hours as needed for pain  
  
 multivitamin liquid Commonly known as:  THERAGRAN  
10 mL daily. OMEPRAZOLE PO Take 10 mg by mouth two (2) times a day. OXYGEN-AIR DELIVERY SYSTEMS  
2 L/min by Nasal route as needed. potassium chloride 10 mEq tablet Commonly known as:  K-DUR, KLOR-CON 10 mEq by Feeding Tube route daily. Prescriptions Printed Refills diph,Pertuss,Acell,,Tet Vac-PF (ADACEL) 2 Lf-(2.5-5-3-5 mcg)-5Lf/0.5 mL susp 0 Si.5 mL by IntraMUSCular route once for 1 dose. Class: Print Route: IntraMUSCular Follow-up Instructions Return in about 6 months (around 2017), or if symptoms worsen or fail to improve. To-Do List   
 2017 Imaging:  DEXA BONE DENSITY STUDY AXIAL Patient Instructions Medicare Part B Preventive Services Limitations Recommendation Scheduled Bone Mass Measurement 
(age 72 & older, biennial) Requires diagnosis related to osteoporosis or estrogen deficiency. Biennial benefit unless patient has history of long-term glucocorticoid tx or baseline is needed because initial test was by other method Every 2 years or more frequently if medically necessary. Ordered Cardiovascular Screening Blood Tests (every 5 years) Total cholesterol, HDL, Triglycerides Order as a panel if possible Every 5 years. Done: 
11/10/2015 Colorectal Cancer Screening 
-Fecal occult blood test (annual) -Flexible sigmoidoscopy (5y) 
-Screening colonoscopy (10y) -Barium Enema Colorectal cancer screening, ages 54-65. For all patients 48 and older: 
-Annual fecal occult blood test or 
colonoscopy every 10 years or 
-Flexible sigmoidoscopy every 5 years or 
-lower endoscopy to be performed more frequently, if advised by GI. Done: 
1/10/2013 Counseling to Prevent Tobacco Use (up to 8 sessions per year) - Counseling greater than 3 and up to 10 minutes - Counseling greater than 10 minutes Patients must be asymptomatic of tobacco-related conditions to receive as preventive service Two cessation counseling attempts (up to 8 counseling sessions) per year. Current smoker Diabetes Screening Tests (at least every 3 years, Medicare covers annually or at 6-month intervals for prediabetic patients) Fasting blood sugar (FBS) or glucose tolerance test (GTT) Patient must be diagnosed with one of the following: 
-Hypertension, Dyslipidemia, obesity, previous impaired FBS or GTT 
Or any two of the following: overweight, FH of diabetes, age ? 72, history of gestational diabetes, birth of baby weighing more than 9 pounds Annually or every 6 months if previous diagnosis of elevated FBS, elevated HbA1c, or impaired GTT, or glucosuria. Done: 
11/10/2015 Diabetes Self-Management Training (DSMT) (no USPSTF recommendation) Requires referral by treating physician for patient with diabetes or renal disease. 10 hours of initial DSMT session of no less than 30 minutes each in a continuous 12-month period. 2 hours of follow-up DSMT in subsequent years. Up to 10 hours of initial training within a continuous 12 month period of subsequent years: up to 2 hours of follow-up training each year after the initial year.   N/A  
 Glaucoma Screening (no USPSTF recommendation) Diabetes mellitus, family history, , age 48 or over,  American, age 72 or over Annually for covered beneficiaries. N/A Human Immunodeficiency Virus (HIV) Screening (annually for increased risk patients) HIV-1 and HIV-2 by EIA, SHANNAN, rapid antibody test, or oral mucosa transudate Patient must be at increased risk for HIV infection per USPSTF guidelines or pregnant. Tests covered annually for patients at increased risk. Pregnant patients may receive up to 3 test during pregnancy. Annually for beneficiaries at increased risk, including anyone who asks for the test. Not high risk Medical Nutrition Therapy (MNT) (for diabetes or renal disease not recommended schedule) Requires referral by treating physician for patient with diabetes or renal disease. Can be provided in same year as diabetes self-management training (DSMT), and CMS recommends medical nutrition therapy take place after DSMT. Up to 3 hours for initial year and 2 hours in subsequent years. First year: 3 hours of one-on-one counseling or subsequent years: 2 hours. N/A Prostate Cancer Screening (annually up to age 76) - Digital rectal exam (KAMALJIT) - Prostate specific antigen (PSA) Annually (age 48 or over), KAMALJIT not paid separately when covered E/M service is provided on same date Once every 12 months for patients age older than 48years of age includes: digital rectal exam and/or prostate specific antigen test. N/A Seasonal Influenza Vaccination (annually)  Once per fall or winter season. Done: 
10/13/2016 Pneumococcal Vaccination (once after 72)  Once after age 72 and if more than 5 years since last vaccination and/or uncertainty of vaccine status. Pneumococcal: 
1/22/2016 Prevnar 13: 
10/13/2016 Hepatitis B Vaccinations (if medium/high risk) Medium/high risk factors:  End-stage renal disease, 
 Hemophiliacs who received Factor VIII or IX concentrates, Clients of institutions for the mentally retarded, Persons who live in the same house as a HepB virus carrier, Homosexual men, Illicit injectable drug abusers. Schedule course of vaccines if patient not previously vaccinated *additional shots if medically necessary. Not high risk Screening Mammography (biennial age 54-69)? Annually (age 36 or over) Age 28 through 44: one baseline or aged 36 and older: annually. Done: 
10/13/2016 Screening Pap Tests and Pelvic Examination (up to age 79 and after 79 if unknown history or abnormal study last 10 years) Every 24 months except high risk Annually if at high risk for developing cervical or vaginal cancer, or childbearing, age with abnormal Pap test within past 3 years or every 2 years for women at normal risk. Deferred to next visit. Ultrasound Screening for Abdominal Aortic Aneurysm (AAA) (once) Patient must be referred through IPPE and not have had a screening for abdominal aortic aneurysm before under Medicare. Limited to patients who meet one of the following criteria: 
- Men who are 73-68 years old and have smoked more than 100 cigarettes in their lifetime. 
-Anyone with a FH of AAA 
-Anyone recommended for screening by USPSTF Once in a lifetime. N/A Schedule of Personalized Health Plan (Provide Copy to Patient) The best way to stay healthy is to live a healthy lifestyle. A healthy lifestyle includes regular exercise, eating a well-balanced diet, keeping a healthy weight and not smoking. Regular physical exams and screening tests are another important way to take care of yourself. Preventive exams provided by health care providers can find health problems early when treatment works best and can keep you from getting certain diseases or illnesses.  Preventive services include exams, lab tests, screenings, shots, monitoring and information to help you take care of your own health. All people over 65 should have a pneumonia shot. Pneumonia shots are usually only needed once in a lifetime unless your doctor decides differently. All people over 65 should have a yearly flu shot. People over 65 are at medium to high risk for Hepatitis B. Three shots are needed for complete protection. In addition to your physical exam, some screening tests are recommended: 
 
Bone mass measurement (dexa scan) is recommended every two years Diabetes Mellitus screening is recommended every year. Glaucoma is an eye disease caused by high pressure in the eye. An eye exam is recommended every year. Cardiovascular screening tests that check your cholesterol and other blood fat (lipid) levels are recommended every five years. Colorectal Cancer screening tests help to find pre-cancerous polyps (growths in the colon) so they can be removed before they turn into cancer. Tests ordered for screening depend on your personal and family history risk factors. Screening for Breast Cancer is recommended yearly with a mammogram. 
 
Screening for Cervical Cancer is recommended every two years (annually for certain risk factors, such as previous history of STD or abnormal PAP in past 7 years), with a Pelvic Exam with PAP Here is a list of your current Health Maintenance items with a due date: 
Health Maintenance Topic Date Due  
 DTaP/Tdap/Td series (1 - Tdap) 04/07/2017 (Originally [birth year]/2/1978)  Pneumococcal 19-64 Highest Risk (3 of 3 - PCV13) 10/13/2017  MEDICARE YEARLY EXAM  03/03/2018  BREAST CANCER SCRN MAMMOGRAM  10/13/2018  PAP AKA CERVICAL CYTOLOGY  11/17/2018  COLONOSCOPY  01/10/2023  Hepatitis C Screening  Addressed  INFLUENZA AGE 9 TO ADULT  Completed Preventing Falls: Care Instructions Your Care Instructions Getting around your home safely can be a challenge if you have injuries or health problems that make it easy for you to fall. Loose rugs and furniture in walkways are among the dangers for many older people who have problems walking or who have poor eyesight. People who have conditions such as arthritis, osteoporosis, or dementia also have to be careful not to fall. You can make your home safer with a few simple measures. Follow-up care is a key part of your treatment and safety. Be sure to make and go to all appointments, and call your doctor if you are having problems. It's also a good idea to know your test results and keep a list of the medicines you take. How can you care for yourself at home? Taking care of yourself · You may get dizzy if you do not drink enough water. To prevent dehydration, drink plenty of fluids, enough so that your urine is light yellow or clear like water. Choose water and other caffeine-free clear liquids. If you have kidney, heart, or liver disease and have to limit fluids, talk with your doctor before you increase the amount of fluids you drink. · Exercise regularly to improve your strength, muscle tone, and balance. Walk if you can. Swimming may be a good choice if you cannot walk easily. · Have your vision and hearing checked each year or any time you notice a change. If you have trouble seeing and hearing, you might not be able to avoid objects and could lose your balance. · Know the side effects of the medicines you take. Ask your doctor or pharmacist whether the medicines you take can affect your balance. Sleeping pills or sedatives can affect your balance. · Limit the amount of alcohol you drink. Alcohol can impair your balance and other senses. · Ask your doctor whether calluses or corns on your feet need to be removed. If you wear loose-fitting shoes because of calluses or corns, you can lose your balance and fall. · Talk to your doctor if you have numbness in your feet. Preventing falls at home · Remove raised doorway thresholds, throw rugs, and clutter. Repair loose carpet or raised areas in the floor. · Move furniture and electrical cords to keep them out of walking paths. · Use nonskid floor wax, and wipe up spills right away, especially on ceramic tile floors. · If you use a walker or cane, put rubber tips on it. If you use crutches, clean the bottoms of them regularly with an abrasive pad, such as steel wool. · Keep your house well lit, especially [place], and outside walkways. Use night-lights in areas such as hallways and bathrooms. Add extra light switches or use remote switches (such as switches that go on or off when you clap your hands) to make it easier to turn lights on if you have to get up during the night. · Install sturdy handrails on stairways. · Move items in your cabinets so that the things you use a lot are on the lower shelves (about waist level). · Keep a cordless phone and a flashlight with new batteries by your bed. If possible, put a phone in each of the main rooms of your house, or carry a cell phone in case you fall and cannot reach a phone. Or, you can wear a device around your neck or wrist. You push a button that sends a signal for help. · Wear low-heeled shoes that fit well and give your feet good support. Use footwear with nonskid soles. Check the heels and soles of your shoes for wear. Repair or replace worn heels or soles. · Do not wear socks without shoes on wood floors. · Walk on the grass when the sidewalks are slippery. If you live in an area that gets snow and ice in the winter, sprinkle salt on slippery steps and sidewalks. Preventing falls in the bath · Install grab bars and nonskid mats inside and outside your shower or tub and near the toilet and sinks. · Use shower chairs and bath benches. · Use a hand-held shower head that will allow you to sit while showering.  
· Get into a tub or shower by putting the weaker leg in first. Get out of a tub or shower with your strong side first. 
· Repair loose toilet seats and consider installing a raised toilet seat to make getting on and off the toilet easier. · Keep your bathroom door unlocked while you are in the shower. Where can you learn more? Go to http://dayron-raj.info/. Enter [id number] in the search box to learn more about \"Preventing Falls: Care Instructions. \" Current as of: August 4, 2016 Content Version: 11.1 © 3275-9853 [employer]. Care instructions adapted under license by [employer] (which disclaims liability or warranty for this information). If you have questions about a medical condition or this instruction, always ask your healthcare professional. [address] any warranty or liability for your use of this information. Advance Directives: Care Instructions Your Care Instructions An advance directive is a legal way to state your wishes at the end of your life. It tells your family and your doctor what to do if you can no longer say what you want. There are two main types of advance directives. You can change them any time that your wishes change. · A living will tells your family and your doctor your wishes about life support and other treatment. · A medical power of  lets you name a person to make treatment decisions for you when you can't speak for yourself. This person is called a health care agent. If you do not have an advance directive, decisions about your medical care may be made by a doctor or a  who doesn't know you. It may help to think of an advance directive as a gift to the people who care for you. If you have one, they won't have to make tough decisions by themselves. Follow-up care is a key part of your treatment and safety. Be sure to make and go to all appointments, and call your doctor if you are having problems.  It's also a good idea to know your test results and keep a list of the medicines you take. How can you care for yourself at home? · Discuss your wishes with your loved ones and your doctor. This way, there are no surprises. · Many states have a unique form. Or you might use a universal form that has been approved by many states. This kind of form can sometimes be completed and stored online. Your electronic copy will then be available wherever you have a connection to the Internet. In most cases, doctors will respect your wishes even if you have a form from a different state. · You don't need a  to do an advance directive. But you may want to get legal advice. · Think about these questions when you prepare an advance directive: ¨ Who do you want to make decisions about your medical care if you are not able to? Many people choose a family member, close friend, or doctor. ¨ Do you know enough about life support methods that might be used? If not, talk to your doctor so you understand. ¨ What are you most afraid of that might happen? You might be afraid of having pain, losing your independence, or being kept alive by machines. ¨ Where would you prefer to die? Choices include your home, a hospital, or a nursing home. ¨ Would you like to have information about hospice care to support you and your family? ¨ Do you want to donate organs when you die? ¨ Do you want certain Adventism practices performed before you die? If so, put your wishes in the advance directive. · Read your advance directive every year, and make changes as needed. When should you call for help? Be sure to contact your doctor if you have any questions. Where can you learn more? Go to http://dayron-raj.info/. Enter R264 in the search box to learn more about \"Advance Directives: Care Instructions. \" Current as of: February 24, 2016 Content Version: 11.1 © 7363-8764 [employer].  Care instructions adapted under license by [address] (which disclaims liability or warranty for this information). If you have questions about a medical condition or this instruction, always ask your healthcare professional. [address] any warranty or liability for your use of this information. Introducing [facility]! Yonas Ma introduces Express Fit patient portal. Now you can access parts of your medical record, email your doctor's office, and request medication refills online. 1. In your internet browser, go to [email or web address] 2. Click on the First Time User? Click Here link in the Sign In box. You will see the New Member Sign Up page. 3. Enter your Express Fit Access Code exactly as it appears below. You will not need to use this code after youve completed the sign-up process. If you do not sign up before the expiration date, you must request a new code. · Express Fit Access Code: [email or web address] Expires: 4/26/2017  7:45 AM 
 
4. Enter the last four digits of your Social Security Number (xxxx) and Date of Birth (mm/dd/yyyy) as indicated and click Submit. You will be taken to the next sign-up page. 5. Create a Express Fit ID. This will be your Express Fit login ID and cannot be changed, so think of one that is secure and easy to remember. 6. Create a Express Fit password. You can change your password at any time. 7. Enter your Password Reset Question and Answer. This can be used at a later time if you forget your password. 8. Enter your e-mail address. You will receive e-mail notification when new information is available in [address] 9. Click Sign Up. You can now view and download portions of your medical record. 10. Click the Download Summary menu link to download a portable copy of your medical information. If you have questions, please visit the Frequently Asked Questions section of the Express Fit website.  Remember, Express Fit is NOT to be used for urgent needs. For medical emergencies, dial 911. Now available from your iPhone and Android! Please provide this summary of care documentation to your next provider. Your primary care clinician is listed as Vince Goldberg. If you have any questions after today's visit, please call [phone].

## 2017-03-05 PROBLEM — I10 ESSENTIAL HYPERTENSION: Status: ACTIVE | Noted: 2017-03-05

## 2017-03-05 PROBLEM — R91.8 MULTIPLE PULMONARY NODULES: Status: ACTIVE | Noted: 2017-03-05

## 2017-03-05 RX ORDER — LEVOTHYROXINE SODIUM 25 UG/1
TABLET ORAL
Qty: 135 TAB | Refills: 5
Start: 2017-03-05 | End: 2017-04-13

## 2017-03-05 NOTE — PROGRESS NOTES
HPI:   Cheyenne Richard is a 63y.o. year old female who presents today for evaluation of squamous cell carcinoma of the oropharynx, COPD, hypertension, PAD, carotid artery disease, mesenteric ischemia, pulmonary nodules, chronic headaches, and carpal tunnel syndrome. She reports that she is doing relatively well. She is complaining of cough productive of green sputum for one week, but denies any fevers, chills, wheezing, or shortness of breath. She has been using Mucinex with good control of symptoms. She reluctantly admits that she continues to smoke,  although is unwilling to quantify the amount. She has had multiple recent ED visits for leaking around her G-tube, and on 2/24/2017, she underwent placement of a larger tube by TALHA Simon, which she states has solved the problem. She is otherwise without complaints. 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 She underwent panendoscopy with biopsy, PEG tube placement and tracheostomy on 5/13/2013. She was subsequently treated with radiation and chemotherapy (Taxol and Cisplatin), completed 8/6/2013. She has had no clinical evidence of recurrent disease, with negative serial PET scans/ CT scans of the chest and neck. A follow-up PET scan was obtained in 2/4/2016 which revealed no tumor activity in the head or neck, but multifocal patchy nodular activity in the lungs (right > left), could be inflammatory although could also be consistent with metastases. A chest CT scan was obtained 2/26/16 revealing new bilateral pulmonary nodules, also consistent with inflammation vs. metastases. She had a repeat chest CT scan on 6/21/2016, which showed that all of the lung nodular densities had improved in appearance and decreased in size, with no new or enlarging pulmonary nodules or enlarged lymph nodes demonstrated.  She also had a neck CT scan (6/21/2016) which was negative for enlarged cervical lymph nodes, but there was an amorphous soft tissue density diffusely within the parapharyngeal fat, which was most likely post-therapy change rather than neoplastic recurrence since there was no discrete masslike focal accumulation of tissue. She had a repeat PET scan (1/21/2017) which showed marked interval improvement of multifocal hypermetabolic lesions since 9/3065, with only a mild hypermetabolic focus in the right pretracheal mediastinum, probably correlating to a tiny lymph node, clinically reactive. She also had a repeat CT scan of the neck (1/30/2017) showing no residual or recurrent mass, and CT scan of the chest (1/30/2017) showing stable right pulmonary nodules. She is being followed by Dr. Grant Lna [facility]) and Dr. Shawna King (oncology). Following her XRT and chemotherapy treatment, the tracheostomy was successfully removed. Her course has been complicated by esophageal stricture and aspiration, requiring G tube placement. She underwent multiple endoscopic dilatations under fluoroscopy using rendezvous techniques (10/2014) with reestablishment of continuity with her upper and lower esophagus. However, it was noted that she was having significant aspiration with both solids and liquids, due to poor laryngeal mobility from epiglottic and laryngeal scarring s/p XRT. In 11/29/2015, she was found to have a posterior left upper lobe cavitary mass on chest CT scan. She was admitted to [facility] and underwent bronchoscopy and bronchoalveolar lavage, which revealed no endobronchial obstruction or nodules and pathology was negative for malignant cells. Her QuantiFERON Gold was negative for TB. Cultures were positive for MSSA and pseudomonas and she was diagnosed with a left upper lobe abscess, thought to be secondary to aspiration. She was discharged home on 12/9/2015 with a PICC line and was treated with nafcillin and meropenem.  She was again hospitalized at [facility] on 12/26/2015 when she presented with melena and leakage from PEG site and was found to have a Hb of 6.1. She was transfused and due to persistent leakage, had her G tube converted to a G-J tube by interventional radiology. She had severe hypokalemia, which required multiple IV/ per tube dosing. She underwent endoscopy by Dr. Malathi Conner, but he was only able to pass the scope into the pharynx since the upper esophageal sphincter was completely fused shut. ENT was consulted about possibly attempting to open the proximal esophagus, and it was felt that the risk of perforation and mediastinal infection was too high, especially since she had adequete enteral access for nutrition. She was admitted to [facility] from 1/8/2016 to 1/22/2016 for a recurrent upper G-I bleed (Hb 6.8 requiring 4 U of PRBCs), acute hypoxic respiratory failure with interstitial pulmonary edema and bilateral pleural effusions, and candidemia (most likely from PICC line). She had an echocardiogram (1/15/2016) which showed normal LV size and function (EF 65%). Thoracentesis revealed effusions were transudative. She was treated with fluconazole, and had her G-J tube converted back to a G-Tube. She had been relatively stable since discharge, on continuous feedings through her G-tube. She reports that she continues to have difficulty with her G-tube cracking or becoming loose and falling out, and she has visited the ED multiple times over the last several months to have her G-tube replaced. She also has a history of peripheral vascular disease and presented in 2/2013 with weight loss and post-prandial pain. 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 Ryanne Ambrose. She again represented with abdominal complaints in 4/2013 and was thought to have stent restenosis since she was not taking clopidogrel due to itching. In 5/2013, she underwent a celiac arteriogram which showed that the stent was patent.  In 6/2015 and 6/2016, an abdominal duplex scan showed >70% stenosis of the celiac artery, but she remains asymptomatic. In 6/21/2016, surveillance neck CT scan showed high grade bilateral internal carotid arterial stenoses. A carotid duplex scan (6/24/2016) confirmed severe (>70%) right internal carotid artery stenosis and moderate (50-69%) left internal carotid artery stenosis. On 7/20/2016, she underwent a subclavian, cerebral, vertebral, and carotid arteriogram by Dr. Kendall Mata, which revealed multiple areas of atherosclerosis, but no significant stenoses; the right internal carotid artery had a 50% narrowing noted while all other vessels were patent. She continues to be maintained on aspirin. According to the chart, she has a history of hepatitis C and cirrhosis. However, review of records show negative hepatitis B and C panels in 3/2014 at [facility], and CT scans of abdomen show a normal liver in 5/9/2015 and 11/9/2015. She had a screening colonoscopy in 1/2013 by Dr. Toshia Zhou which was normal.     She has a history of hypertension that was treated in the past with lisinopril. She no longer requires medication. She also has a history of hypothyroidism and is on Synthroid. Denies any cold intolerance, hair or skin changes. She has a history of osteopenia diagnosed in 2010, with DEXA showing T-scores: femoral neck  left -2.2 / right -2.4, and lumbar -0.5. She was treated with alendronate for one year, but she discontinued it due to throat irritation. She continues to take calcium and Vitamin D. She has no history of pathologic fractures. She has a history of suspected COPD, with a long history of smoking 1-2 ppd. She was being treated with Flovent and albuterol-ipratropium (Duo-neb) nebulizers; however, she states that she has not needed to use these recently. She denies any shortness of breath currently. She is being followed by Dr. Donal Garcia for complaints of daily headaches and numbness and tingling in her left hand.  She is being treated with gabapentin and was instructed to use a wrist splint for probable carpal tunnel syndrome. Past Medical History:   Diagnosis Date    Cigarette smoker     COPD [facility]     Esophageal stricture 2014    s/p XRT for oropharyngeal cancer; requiring GJ tube for nutrition.  Hypertension     Hypothyroidism     Melanoma ([facility]     Mesenteric ischemia ([facility] [facility]     Oropharyngeal cancer ([facility] [address] 5/2013    [id number] squamous cell carcinoma of posterion pharynx and tonsil s/p XRT and chemo.  Osteopenia     Panic disorder     Peptic ulcer disease     Stenosis of celiac artery ([facility] [address] 2/2013    s/p stent    Vitamin D deficiency      Past Surgical History:   Procedure Laterality Date    ABDOMEN SURGERY PROC UNLISTED      \"ulcer\" surgery    HX ENDOSCOPY  5/9/2014    w/ dilation    HX ENDOSCOPY  5/13/2014    w/ dilation    HX ENDOSCOPY  5/21    with Dilatation    HX ENDOSCOPY  6/4/2014    w/ dilation    HX GI      HX ORTHOPAEDIC      intramedullary chao fixation of the right tiba    HX OTHER SURGICAL      skin cancer removal from right shoulder; PEG TUBE    HX TRACHEOSTOMY       Current Outpatient Prescriptions   Medication Sig    levothyroxine (SYNTHROID) 25 mcg tablet Take 1.5 tablet daily 30 minutes before breakfast and medications.  cyclobenzaprine (FLEXERIL) 5 mg tablet Take 1 Tab by mouth three (3) times daily as needed for Muscle Spasm(s).  morphine 10 mg/5 mL oral solution 2.5ml or 5ml by peg tube every 8 hours as needed for pain    aspirin 81 mg chewable tablet Take 81 mg by mouth daily.  albuterol-ipratropium (DUO-NEB) 2.5 mg-0.5 mg/3 ml nebu 3 mL by Nebulization route every four (4) hours as needed.  gabapentin (NEURONTIN) 300 mg capsule Take 300 mg by mouth three (3) times daily.  diphenhydrAMINE (BENADRYL) 25 mg capsule Take 25 mg by mouth daily as needed for Itching (Taken prior to Plavix dose).  OMEPRAZOLE PO Take 10 mg by mouth two (2) times a day.     cyanocobalamin (VITAMIN B12) 500 mcg tablet Take 500 mcg by mouth daily. Via peg    lidocaine HCl-hydrocortison ac topical cream Apply  to affected area two (2) times a day.  multivitamin (THERAGRAN) liquid 10 mL daily.  potassium chloride (K-DUR, KLOR-CON) 10 mEq tablet 10 mEq by Feeding Tube route daily.  cholecalciferol, VITAMIN D3, (VITAMIN D3) 5,000 unit tab tablet Take 2,000 Units by mouth daily.  amylase-lipase-protease (CREON 50059) 12,000-38,000 -60,000 unit capsule Instill 1 Cap into tube Take As Needed (for clogged feeding tube).  fluticasone 250 mcg/actuation dsdv 1 [place].  OXYGEN-AIR DELIVERY SYSTEMS 2 L/min by Nasal route as needed. No current facility-administered medications for this visit. Allergies and Intolerances: Allergies   Allergen Reactions    Percocet [Oxycodone-Acetaminophen] Itching and Hives    Percodan [Oxycodone-Aspirin] Rash and Itching    Plavix [Clopidogrel] Rash and Hives     Family History: No FH of breast or colon cancer. Sister had uterine cancer. Family History   Problem Relation Age of Onset    Heart Disease Mother      Social History: She is  living with her . She has no children. She retired in 2013 from being the  at the [employer]. She  reports that she has been smoking Cigarettes. She has been smoking about 0.50 packs per day. She has never used smokeless tobacco.   History   Alcohol Use    8.4 oz/week    0 Standard drinks or equivalent, 14 Cans of beer per week     Immunization History:   Immunization History   Administered Date(s) Administered    Influenza Vaccine (Quad) PF 11/17/2015, 10/13/2016    Influenza Vaccine PF 10/21/2013, 11/04/2014    Pneumococcal Polysaccharide (PPSV-23) 10/13/2016       Review of Systems:   As above included in HPI.   Otherwise 11 point review of systems negative including constitutional, skin, HENT, eyes, respiratory, cardiovascular, gastrointestinal, genitourinary, musculoskeletal, endo/heme/aller, neurological.    Physical:   Vitals:   BP: 154/82  HR: (!) 116  WT: 90 lb (40.8 kg)  BMI:  17.52 kg/m2    Exam:   Pt appears well; alert and oriented x 3; appropriate affect. HEENT: PERRLA, anicteric, oropharynx clear, no JVD, adenopathy or thyromegaly. No carotid bruits or radiated murmur. Lungs: clear to auscultation, no wheezes, rhonchi, or rales. Heart: regular rate and rhythm. No murmur, rubs, gallops  Abdomen: soft, nontender, nondistended, normal bowel sounds, no hepatosplenomegaly or masses. Extremities: without edema. Pulses 1-2+ bilaterally. Review of Data:  Labs:  Hospital Outpatient Visit on 02/27/2017   Component Date Value Ref Range Status    WBC 02/27/2017 7.2  4.6 - 13.2 K/uL Final    RBC 02/27/2017 4.16* 4.20 - 5.30 M/uL Final    HGB 02/27/2017 13.1  12.0 - 16.0 g/dL Final    HCT 02/27/2017 41.5  35.0 - 45.0 % Final    MCV 02/27/2017 99.8* 74.0 - 97.0 FL Final    MCH 02/27/2017 31.5  24.0 - 34.0 PG Final    MCHC 02/27/2017 31.6  31.0 - 37.0 g/dL Final    RDW 02/27/2017 13.8  11.6 - 14.5 % Final    PLATELET 02/66/0834 751  135 - 420 K/uL Final    MPV 02/27/2017 11.3  9.2 - 11.8 FL Final    NEUTROPHILS 02/27/2017 80* 40 - 73 % Final    LYMPHOCYTES 02/27/2017 4* 21 - 52 % Final    MONOCYTES 02/27/2017 13* 3 - 10 % Final    EOSINOPHILS 02/27/2017 1  0 - 5 % Final    BASOPHILS 02/27/2017 2  0 - 2 % Final    ABS. NEUTROPHILS 02/27/2017 5.8  1.8 - 8.0 K/UL Final    ABS. LYMPHOCYTES 02/27/2017 0.3* 0.9 - 3.6 K/UL Final    ABS. MONOCYTES 02/27/2017 0.9  0.05 - 1.2 K/UL Final    ABS. EOSINOPHILS 02/27/2017 0.1  0.0 - 0.4 K/UL Final    ABS.  BASOPHILS 02/27/2017 0.1* 0.0 - 0.06 K/UL Final    DF 02/27/2017 AUTOMATED    Final    Sodium 02/27/2017 131* 136 - 145 mmol/L Final    Potassium 02/27/2017 4.7  3.5 - 5.5 mmol/L Final    Chloride 02/27/2017 94* 100 - 108 mmol/L Final    CO2 02/27/2017 26  21 - 32 mmol/L Final    Anion gap 02/27/2017 11 3.0 - 18 mmol/L Final    Glucose 02/27/2017 81  74 - 99 mg/dL Final    BUN 02/27/2017 10  7.0 - 18 MG/DL Final    Creatinine 02/27/2017 0.81  0.6 - 1.3 MG/DL Final    BUN/Creatinine ratio 02/27/2017 12  12 - 20   Final    GFR est AA 02/27/2017 >60  >60 ml/min/1.73m2 Final    GFR est non-AA 02/27/2017 >60  >60 ml/min/1.73m2 Final    Calcium 02/27/2017 10.0  8.5 - 10.1 MG/DL Final    TSH 02/27/2017 0.94  0.36 - 3.74 uIU/mL Final    T4, Free 02/27/2017 1.0  0.7 - 1.5 NG/DL Final    Vitamin D 25-Hydroxy 02/27/2017 41.5  30 - 100 ng/mL Final    Iron 02/27/2017 54  50 - 175 ug/dL Final    TIBC 02/27/2017 382  250 - 450 ug/dL Final    Iron % saturation 02/27/2017 14  % Final    Calcium 02/27/2017 10.7* 8.5 - 10.1 MG/DL Final    PTH, Intact 02/27/2017 30.0  14.0 - 72.0 pg/mL Final    Color 02/27/2017 YELLOW    Final    Appearance 02/27/2017 CLEAR    Final    Specific gravity 02/27/2017 1.035* 1.003 - 1.030   Final    pH (UA) 02/27/2017 5.0  5.0 - 8.0   Final    Protein 02/27/2017 NEGATIVE   NEG mg/dL Final    Glucose 02/27/2017 NEGATIVE   NEG mg/dL Final    Ketone 02/27/2017 NEGATIVE   NEG mg/dL Final    Bilirubin 02/27/2017 NEGATIVE   NEG   Final    Blood 02/27/2017 NEGATIVE   NEG   Final    Urobilinogen 02/27/2017 0.2  0.2 - 1.0 EU/dL Final    Nitrites 02/27/2017 NEGATIVE   NEG   Final    Leukocyte Esterase 02/27/2017 NEGATIVE   NEG   Final    WBC 02/27/2017 0 to 3  0 - 4 /hpf Final    RBC 02/27/2017 0  0 - 5 /hpf Final    Epithelial cells 02/27/2017 FEW  0 - 5 /lpf Final    Bacteria 02/27/2017 NEGATIVE   NEG /hpf Final       Imaging: none    Health Maintenance:  Screening:    Mammogram: negative (10/2016)   PAP smear: s/p PRASHANTH. No further screening. Colorectal: colonoscopy (1/2013) normal. Dr. Charlette Duran. Due 2023.    Depression: none   DM (HbA1c/FPG): FPG 81 (2/2017)   Hepatitis C: negative (3/2014) at [address]: none   DEXA: osteopenia (12/2010)   Smoking: resumed smoking 0.5 to 1 ppd   Vitamin D: 41.5 (2/2017)   Medicare Wellness: today      Impression:  Patient Active Problem List   Diagnosis Code    Melanoma right scapula C43.9    COPD (chronic obstructive pulmonary disease) ([facility] [address] J44.9    Carotid artery disease ([facility] [address] bilateral moderate I77.9    Celiac artery stenosis, status post stent I77.4    Severe protein-calorie malnutrition ([facility] [address] E43    Squamous cell carcinoma of oropharynx, with PEG tube and tracheostomy C10.9    Anemia D64.9    Esophageal stricture K22.2    Vitamin D deficiency E55.9    Acquired hypothyroidism E03.9    Hyponatremia E87.1    Carpal tunnel syndrome, left G56.02    GI bleed K92.2    Dysphagia, 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    S/P percutaneous endoscopic gastrostomy (PEG) tube placement (HCC) Z93.1    History of radiation to head and neck region Z92.3    Cough productive of purulent sputum R05    Hypercalcemia E83.52    Current smoker F17.200    Essential hypertension I10    Multiple pulmonary nodules R91.8       Plan:  1. Productive cough. Present several days, but productive of scant green sputum. No increase in shortness of breath. Patient does not feel that she needs antibiotics currently. Continue with symptomatic treatment with Mucinex. If worsens, instructed to call office. 2. Hyponatremia. Mild and has been present intermittently since at least 2015. Remains stable with no worsening. May be related to solute concentration of tube feeds or excess free water intake. Continue to follow. 4. Hypercalcemia. Mildly elevated since 3/2016. Repeat with PTH level normal. Follow. 5. Oropharyngeal carcinoma s/p XRT/chemo. Currently stable. Recent PET scan and neck/chest CT scans with stable pulmonary nodules, and otherwise without clinical evidence of active disease. She is being followed by Dr. Holly Shanks at [facility] and Dr. Maia Dumont. 6. Esophageal stricture. Currently receiving all nutrition via G-tube. On intermittent feeds.  Weight decreased five pounds since last visit. Discussed caloric intake and need for increase. Will offer dietician consultation if continues to decline. Continue to follow. 7. Lung abscess. Chest CT scan (6/21/2016) showed that the previously evident cavitary area within the lateral left chest had resolved with only a residual linear density without a cavitary component remaining. Follow. 8. Iron deficiency anemia. Resolved. Was secondary to g-i loss. No longer taking iron supplement. Hb/Hct remain stable. Follow closely. 9. Hypothyroidism. TSH at therapeutic goal between 0.5 and 5.0 on lower dose at 37.5 mcg of Synthroid. Continue to follow. 10. Osteopenia. Last bone density scan 12/2010. Using femoral neck T-scores, calculated FRAX score estimates her 10 year risk of a major osteoporetic fracture at 6.6% and hip fracture at 1.3%, which are not an indication for biphosphonate treatment (>20% and >3%, respectively). Continue calcium and Vitamin D. Encouraged exercise, particularly weight bearing activities. Severe vitamin D deficiency in 11/2015, and treated with 5000 U daily. Repeat now normal. Instructed to decrease Vitamin D supplement to 2000 U daily. Patient agreeable to have a repeat bone density study. Will order. 11. H/O mesenteric ischemia. 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 Continue to follow. 12. COPD. Currently well controlled. Oon Flovent Diskus but reports not needing to use duoneb nebulizers. Followed by Dr. Ravin Sy. Continue to follow. 13. Carpal tunnel syndrome/ headaches. Being followed by Dr. Giovana Lerner. On gabapentin and using a left arm splint. Follow. 14. Smoking cessation. Discussed at length with the patient, including benefits of improved lung function as well as decreased risk of cardiovascular disease and cancer. Medication and non-pharmacologic options explored. She is not currently interested in cessation, and reluctant to quantify smoking.  Total time spent on topic 5 minutes. 15. Health maintenance. Already received influenza vaccine. Received Pneumovax at last visit. Given script for Tdap. Vitamin D level now normal. To continue maintenance dose supplement. Mammogram up to date. Colorectal cancer screening normal in 1/2013. Follow. Total time: 40 minutes spent with the patient in face-to-face consultation of which greater than 50% was spent on counseling, answering questions and/or coordination of care. Complex medical review and management performed. In addition, an annual Medicare wellness visit was done today. Reviewed and agree with assessment. Patient understands recommendations and agrees with plan. Follow-up in 6 months.

## 2017-03-05 NOTE — PROGRESS NOTES
Advance Care Planning (ACP) Provider Note - Comprehensive     Date of ACP Conversation: 03/02/2017  Persons included in Conversation:  patient  Length of ACP Conversation in minutes:  16 minutes    Authorized Decision Maker (if patient is incapable of making informed decisions):   This person is:  Healthcare Agent/Medical Power of  under Advance Directive          General ACP for ALL Patients with Decision Making Capacity:   Importance of advance care planning, including choosing a healthcare agent to communicate patient's healthcare decisions if patient lost the ability to make decisions, such as after a sudden illness or accident  Understanding of the healthcare agent role was assessed and information provided  Exploration of values, goals, and preferences if recovery is not expected, even with continued medical treatment in the event of: Imminent death  Severe, permanent brain injury  \"In these circumstances, what matters most to you? \"  Care focused more on comfort or quality of life. Review of Existing Advance Directive:  Patient has not completed an advance directive or living will. Discussed with both the patient and her . Expressed eagerness to complete paperwork so as to have it placed on file. For Serious or Chronic Illness:  Understanding of medical condition      Interventions Provided:  Recommended completion of Advance Directive form after review of ACP materials and conversation with prospective healthcare agent   Recommended communicating the plan and making copies for the healthcare agent, personal physician, and others as appropriate (e.g., health system)  Recommended review of completed ACP document annually or upon change in health status   Recommended to complete advance directive and return completed form to office to be copied and scanned into chart. Paperwork reviewed and provided to patient.

## 2017-03-08 ENCOUNTER — TELEPHONE (OUTPATIENT)
Dept: INTERNAL MEDICINE CLINIC | Age: 62
End: 2017-03-08

## 2017-03-08 RX ORDER — AMOXICILLIN AND CLAVULANATE POTASSIUM 875; 125 MG/1; MG/1
1 TABLET, FILM COATED ORAL 2 TIMES DAILY
Qty: 20 TAB | Refills: 0 | Status: ON HOLD | OUTPATIENT
Start: 2017-03-08 | End: 2017-04-07 | Stop reason: ALTCHOICE

## 2017-03-08 NOTE — TELEPHONE ENCOUNTER
PT seen for sinus infection last week- she was told if she wasn't any better to call and she would prescribe meds-  She is having coughing-coughs up green mucous, it gets stuck in her throat due to a feeding tube,, No fever.  Please advise

## 2017-03-22 DIAGNOSIS — Z93.1 S/P PERCUTANEOUS ENDOSCOPIC GASTROSTOMY (PEG) TUBE PLACEMENT (HCC): ICD-10-CM

## 2017-03-22 RX ORDER — CYCLOBENZAPRINE HCL 5 MG
5 TABLET ORAL
Qty: 25 TAB | Refills: 0 | Status: SHIPPED | OUTPATIENT
Start: 2017-03-22 | End: 2017-05-11 | Stop reason: SDUPTHER

## 2017-03-27 DIAGNOSIS — Z93.1 S/P PERCUTANEOUS ENDOSCOPIC GASTROSTOMY (PEG) TUBE PLACEMENT (HCC): ICD-10-CM

## 2017-03-27 RX ORDER — MORPHINE SULFATE ORAL SOLUTION 10 MG/5ML
SOLUTION ORAL
Qty: 250 ML | Refills: 0 | Status: ON HOLD | OUTPATIENT
Start: 2017-03-27 | End: 2017-04-09

## 2017-03-27 NOTE — TELEPHONE ENCOUNTER
Reviewed report generated by the [facility]. Does not demonstrate aberrancies or inconsistencies with regard to the prescribing of controlled medications to this patient by other providers.   Last filled 12/2/2016

## 2017-03-29 ENCOUNTER — HOSPITAL ENCOUNTER (OUTPATIENT)
Dept: BONE DENSITY | Age: 62
Discharge: HOME | End: 2017-03-29
Attending: INTERNAL MEDICINE
Payer: MEDICARE

## 2017-03-29 DIAGNOSIS — M85.80 OSTEOPENIA: ICD-10-CM

## 2017-03-29 DIAGNOSIS — M85.9 DISORDER OF BONE DENSITY AND STRUCTURE, UNSPECIFIED: ICD-10-CM

## 2017-03-29 PROCEDURE — 77080 DXA BONE DENSITY AXIAL: CPT

## 2017-04-05 ENCOUNTER — TELEPHONE (OUTPATIENT)
Dept: INTERNAL MEDICINE CLINIC | Age: 62
End: 2017-04-05

## 2017-04-05 DIAGNOSIS — C10.9 SQUAMOUS CELL CARCINOMA OF OROPHARYNX (HCC): Primary | ICD-10-CM

## 2017-04-05 DIAGNOSIS — M81.0 OSTEOPOROSIS WITHOUT CURRENT PATHOLOGICAL FRACTURE, UNSPECIFIED OSTEOPOROSIS TYPE: ICD-10-CM

## 2017-04-05 NOTE — TELEPHONE ENCOUNTER
Reviewed bone density study from 3/29/2017 showing T-scores:  femoral neck left -3.0  /right -2.4 and lumbar -0.9 consistent with osteoporosis. This represents significant worsening from study in 2010. Given worsening, would recommend that she begin therapy with a biphosphonate. Called and discussed with . Patient with prior head and neck irradiation for oropharyngeal cancer. Unclear if using biphosphonate would be advisable given increased risk of jaw osteonecrosis. Will refer to Dr. Alexey Luu to address best therapy to manage her osteoporosis.  agreeable.

## 2017-04-07 PROBLEM — S72.113A CLOSED AVULSION FRACTURE OF GREATER TROCHANTER OF FEMUR (HCC): Status: ACTIVE | Noted: 2017-04-07

## 2017-04-09 PROBLEM — R56.9 SEIZURE (HCC): Status: ACTIVE | Noted: 2017-04-09

## 2017-04-14 ENCOUNTER — PATIENT OUTREACH (OUTPATIENT)
Dept: INTERNAL MEDICINE CLINIC | Age: 62
End: 2017-04-14

## 2017-04-14 NOTE — PROGRESS NOTES
NNTOCIP  Patient admitted to [facility] on 4/7/2017 to 4/9/2017 for right hip closed fracture post fall. Patient readmitted on 4/9/2017 to 4/13/2017 to [facility] for seizure. Patient transferred to  [facility] for continuum of care. Introduced self, role and reason for call. Spoke with Lorenza Spears LPN at the facility; verified two patient identifiers and requested she contacts me when the patient is discharged to schedule a follow appointment with Dr. Romeo Bird. Home medication reconciliation completed and allergies reviewed. Contact information was provided for future reference or further questions. Patient presenting symptoms:   4/7-4/9/2017: fall, right hip pain  4/9-4/13/2017: seizures     DME:  Rolling walker    Plan of care:  1. Take medications as prescribed  2. Attend all follow up appointments  3. [facility]   4. PT/OT  5.  Twocal HN 1 can TID with prosource    Advance Care Planning:  Not on file    Utilization in the past 12 months:  2 hospitalization and 6 ED    RRAT score:   18

## 2017-04-17 ENCOUNTER — TELEPHONE (OUTPATIENT)
Dept: INTERNAL MEDICINE CLINIC | Age: 62
End: 2017-04-17

## 2017-04-17 NOTE — TELEPHONE ENCOUNTER
Pt's  called and stated tht pt fell on 04/07/17 and she broke her leg, he called 911 and they transported her to [facility], she was released on 04/9/17, right after she got home 3 hours later she had a seizure and they came back and took her back to the hospital, she was in ICU , they released her on 04/13/17 to [facility] for rehab, he would like to spk to Dr. Kimi Tristan, he can be reached at [phone]

## 2017-04-28 ENCOUNTER — PATIENT OUTREACH (OUTPATIENT)
Dept: INTERNAL MEDICINE CLINIC | Age: 62
End: 2017-04-28

## 2017-04-28 NOTE — PROGRESS NOTES
Cary at [facility] stated the patient was discharged 4/25/2017. Nurse Navigator attempted to contact patient post discharge. Left message for the patient to contact the office on the answering machine.

## 2017-05-01 ENCOUNTER — TELEPHONE (OUTPATIENT)
Dept: INTERNAL MEDICINE CLINIC | Age: 62
End: 2017-05-01

## 2017-05-01 ENCOUNTER — PATIENT OUTREACH (OUTPATIENT)
Dept: INTERNAL MEDICINE CLINIC | Age: 62
End: 2017-05-01

## 2017-05-01 NOTE — TELEPHONE ENCOUNTER
Bakari Calle from [facility] called, he said that he is there to do a home PT eval and her pulse is 111. The pt says she feels fine, she is just nervous about starting PT again.  He had to call to let us know, all other vitals are normal. He will accept her for home PT for about 6 weeks

## 2017-05-01 NOTE — PROGRESS NOTES
NNTOCIP Contact made: within 2 business days post discharge    Patient admitted to [facility] on 4/7/2017 to 4/9/2017 for right hip closed fracture post fall. Patient readmitted on 4/9/2017 to 4/13/2017 to [facility] for seizure. Patient transferred to  [facility] for continuum of care; 4/13/2017 to 4/25/2017. Conversation with Mr. Trudi Davidson the patients spouse whose listed on the [address]; verified two patient identifiers. Introduced self, role and reason for call. Home medication reconciliation completed and allergies reviewed. Patient presenting symptoms:   4/7-4/9/2017: fall, right hip pain  4/9-4/13/2017: seizures     Mr. Trudi Davidson reports:  [facility] SN/PT came out today, SN weekly and PT twice a week. OT out today or tomorrow for evaluation  Patient is tolerating 2 cans of Twocal HN daily  No skin break down to report  Patient ambulating with a rolling walker     Barriers:   Financial: None identified at this time   Patients comprehension of disease: Patient verbalizes understanding of discharge plan and special follow up. Transportation: Spouse    Resources:  Spouse  [facility]     DME:  Rolling walker     ADL's:  Patient requires assistance with ADL's    Plan of care:  1. Take medications as prescribed  2. Attend all follow up appointments  3. Fall risk  4. Skin breakdown  5. [facility]  6. Twocal HN 1 can TID with prosource    Adherence to previous treatment and likelihood for follow up:  Mr. Skylar Fields understanding of discharge plan and special follow up. Appointments:  5/17/2017 at 56 with Dr. Saldana Mas:  Not on file    Utilization in the past 12 months:  2 hospitalization and 6 ED    RRAT score:   18    Mr. Miguel verbalizes understanding self-management of medications, and when to seek medical attention from PCP.  Mr. Trudi Davidson instructed to bring all medications with them to their next appointment. Mr. Rosanna Yip was given the opportunity to ask questions. Contact information was provided for future reference or further questions.

## 2017-05-08 ENCOUNTER — TELEPHONE (OUTPATIENT)
Dept: INTERNAL MEDICINE CLINIC | Age: 62
End: 2017-05-08

## 2017-05-08 DIAGNOSIS — G40.909 SEIZURE DISORDER (HCC): Primary | ICD-10-CM

## 2017-05-08 NOTE — TELEPHONE ENCOUNTER
She presented in 4/2017 to [facility] in status epilepticus and was treated with Keppra. She needs to remain on this medication with neurology follow-up. Chart states 100 mg/ml with dose 10 ml via G-tube BID. Please confirm with patient's  that this is correct dose and will send order to [facility]. Also, please confirm that she is scheduled for neurology follow-up. Thank you.

## 2017-05-08 NOTE — TELEPHONE ENCOUNTER
Reorder placed for Keppra. Could not be e-prescribed. Please call in. Regarding neurology referral, would prefer if patient sees Dr. Raphael Stuart or Dr. Kenneth Ayala. Please see if they would be willing and I will place referral.    Thank you.

## 2017-05-08 NOTE — TELEPHONE ENCOUNTER
Spoke with patients , she is taking 100mg/ml, 10 ml per G Tube BID. Please send to [facility]YANNA Pope. He said the pt went from the hospital to [facility] and was not given a neurology follow up. She saw Dr [facility] in the past for headaches, in 2015. Did you want her to go back to them? Her  said that Dr Elayne Quintero location is ok with them.

## 2017-05-08 NOTE — TELEPHONE ENCOUNTER
Pt was put on Keppra in hospital.  asking if this is a medication she needs to continue?  If so they need refill to [facility]

## 2017-05-10 NOTE — TELEPHONE ENCOUNTER
[address] [facility], need to know if she is okay with referral to Neurology to see either Dr. Linette Chen or Dr. Maria D Hooper.

## 2017-05-11 DIAGNOSIS — Z93.1 S/P PERCUTANEOUS ENDOSCOPIC GASTROSTOMY (PEG) TUBE PLACEMENT (HCC): ICD-10-CM

## 2017-05-11 RX ORDER — CYCLOBENZAPRINE HCL 5 MG
5 TABLET ORAL
Qty: 25 TAB | Refills: 0 | Status: SHIPPED | OUTPATIENT
Start: 2017-05-11 | End: 2017-06-05 | Stop reason: SDUPTHER

## 2017-05-17 ENCOUNTER — HOSPITAL ENCOUNTER (OUTPATIENT)
Dept: LAB | Age: 62
Discharge: HOME | End: 2017-05-17
Payer: MEDICARE

## 2017-05-17 ENCOUNTER — OFFICE VISIT (OUTPATIENT)
Dept: INTERNAL MEDICINE CLINIC | Age: 62
End: 2017-05-17

## 2017-05-17 VITALS
TEMPERATURE: 98.2 F | SYSTOLIC BLOOD PRESSURE: 130 MMHG | OXYGEN SATURATION: 95 % | HEART RATE: 116 BPM | HEIGHT: 62 IN | BODY MASS INDEX: 15.79 KG/M2 | DIASTOLIC BLOOD PRESSURE: 70 MMHG | WEIGHT: 85.8 LBS

## 2017-05-17 DIAGNOSIS — S72.111S: ICD-10-CM

## 2017-05-17 DIAGNOSIS — E03.9 ACQUIRED HYPOTHYROIDISM: ICD-10-CM

## 2017-05-17 DIAGNOSIS — I10 ESSENTIAL HYPERTENSION: ICD-10-CM

## 2017-05-17 DIAGNOSIS — K22.2 ESOPHAGEAL STRICTURE: ICD-10-CM

## 2017-05-17 DIAGNOSIS — J44.9 CHRONIC OBSTRUCTIVE PULMONARY DISEASE, UNSPECIFIED COPD TYPE (HCC): ICD-10-CM

## 2017-05-17 DIAGNOSIS — D50.9 IRON DEFICIENCY ANEMIA, UNSPECIFIED IRON DEFICIENCY ANEMIA TYPE: ICD-10-CM

## 2017-05-17 DIAGNOSIS — E43 SEVERE PROTEIN-CALORIE MALNUTRITION (HCC): ICD-10-CM

## 2017-05-17 DIAGNOSIS — E87.1 HYPONATREMIA: ICD-10-CM

## 2017-05-17 DIAGNOSIS — C10.9 SQUAMOUS CELL CARCINOMA OF OROPHARYNX (HCC): ICD-10-CM

## 2017-05-17 DIAGNOSIS — R13.13 DYSPHAGIA, CRICOPHARYNGEAL: ICD-10-CM

## 2017-05-17 DIAGNOSIS — E55.9 VITAMIN D DEFICIENCY: ICD-10-CM

## 2017-05-17 DIAGNOSIS — D50.9 IRON DEFICIENCY ANEMIA, UNSPECIFIED IRON DEFICIENCY ANEMIA TYPE: Primary | ICD-10-CM

## 2017-05-17 DIAGNOSIS — R56.9 SEIZURES (HCC): ICD-10-CM

## 2017-05-17 DIAGNOSIS — R91.8 MULTIPLE PULMONARY NODULES: ICD-10-CM

## 2017-05-17 DIAGNOSIS — I77.9 BILATERAL CAROTID ARTERY DISEASE (HCC): ICD-10-CM

## 2017-05-17 DIAGNOSIS — M80.00XS AGE-RELATED OSTEOPOROSIS WITH CURRENT PATHOLOGICAL FRACTURE, SEQUELA: ICD-10-CM

## 2017-05-17 DIAGNOSIS — F17.200 CURRENT SMOKER: ICD-10-CM

## 2017-05-17 LAB
ALBUMIN SERPL BCP-MCNC: 3.4 G/DL (ref 3.4–5)
ALBUMIN/GLOB SERPL: 0.8 {RATIO} (ref 0.8–1.7)
ALP SERPL-CCNC: 176 U/L (ref 45–117)
ALT SERPL-CCNC: 12 U/L (ref 13–56)
ANION GAP BLD CALC-SCNC: 16 MMOL/L (ref 3–18)
AST SERPL W P-5'-P-CCNC: 24 U/L (ref 15–37)
BASOPHILS # BLD AUTO: 0.1 K/UL (ref 0–0.06)
BASOPHILS # BLD: 1 % (ref 0–2)
BILIRUB SERPL-MCNC: 0.7 MG/DL (ref 0.2–1)
BUN SERPL-MCNC: 9 MG/DL (ref 7–18)
BUN/CREAT SERPL: 12 (ref 12–20)
CALCIUM SERPL-MCNC: 9.9 MG/DL (ref 8.5–10.1)
CHLORIDE SERPL-SCNC: 96 MMOL/L (ref 100–108)
CO2 SERPL-SCNC: 23 MMOL/L (ref 21–32)
CREAT SERPL-MCNC: 0.73 MG/DL (ref 0.6–1.3)
DIFFERENTIAL METHOD BLD: ABNORMAL
EOSINOPHIL # BLD: 0.1 K/UL (ref 0–0.4)
EOSINOPHIL NFR BLD: 1 % (ref 0–5)
ERYTHROCYTE [DISTWIDTH] IN BLOOD BY AUTOMATED COUNT: 18.2 % (ref 11.6–14.5)
FERRITIN SERPL-MCNC: 108 NG/ML (ref 8–388)
GLOBULIN SER CALC-MCNC: 4.2 G/DL (ref 2–4)
GLUCOSE SERPL-MCNC: 71 MG/DL (ref 74–99)
HCT VFR BLD AUTO: 36 % (ref 35–45)
HGB BLD-MCNC: 11.3 G/DL (ref 12–16)
IRON SATN MFR SERPL: 20 %
IRON SERPL-MCNC: 78 UG/DL (ref 50–175)
LYMPHOCYTES # BLD AUTO: 6 % (ref 21–52)
LYMPHOCYTES # BLD: 0.6 K/UL (ref 0.9–3.6)
MCH RBC QN AUTO: 29.5 PG (ref 24–34)
MCHC RBC AUTO-ENTMCNC: 31.4 G/DL (ref 31–37)
MCV RBC AUTO: 94 FL (ref 74–97)
MONOCYTES # BLD: 0.8 K/UL (ref 0.05–1.2)
MONOCYTES NFR BLD AUTO: 10 % (ref 3–10)
NEUTS SEG # BLD: 7.2 K/UL (ref 1.8–8)
NEUTS SEG NFR BLD AUTO: 82 % (ref 40–73)
PLATELET # BLD AUTO: 297 K/UL (ref 135–420)
PMV BLD AUTO: 12 FL (ref 9.2–11.8)
POTASSIUM SERPL-SCNC: 3.9 MMOL/L (ref 3.5–5.5)
PROT SERPL-MCNC: 7.6 G/DL (ref 6.4–8.2)
RBC # BLD AUTO: 3.83 M/UL (ref 4.2–5.3)
SODIUM SERPL-SCNC: 135 MMOL/L (ref 136–145)
TIBC SERPL-MCNC: 391 UG/DL (ref 250–450)
TSH SERPL DL<=0.05 MIU/L-ACNC: 2.46 UIU/ML (ref 0.36–3.74)
WBC # BLD AUTO: 8.7 K/UL (ref 4.6–13.2)

## 2017-05-17 PROCEDURE — 80053 COMPREHEN METABOLIC PANEL: CPT | Performed by: INTERNAL MEDICINE

## 2017-05-17 PROCEDURE — 85025 COMPLETE CBC W/AUTO DIFF WBC: CPT | Performed by: INTERNAL MEDICINE

## 2017-05-17 PROCEDURE — 82306 VITAMIN D 25 HYDROXY: CPT | Performed by: INTERNAL MEDICINE

## 2017-05-17 PROCEDURE — 84443 ASSAY THYROID STIM HORMONE: CPT | Performed by: INTERNAL MEDICINE

## 2017-05-17 PROCEDURE — 82728 ASSAY OF FERRITIN: CPT | Performed by: INTERNAL MEDICINE

## 2017-05-17 PROCEDURE — 36415 COLL VENOUS BLD VENIPUNCTURE: CPT | Performed by: INTERNAL MEDICINE

## 2017-05-17 PROCEDURE — 83540 ASSAY OF IRON: CPT | Performed by: INTERNAL MEDICINE

## 2017-05-17 NOTE — PROGRESS NOTES
Patient attended her post hospitalization follow up appointment with Dr. Chano Friedman as scheduled.

## 2017-05-17 NOTE — PROGRESS NOTES
1. Have you been to the ER, urgent care clinic or hospitalized since your last visit? YES. April 7, 2017 right broken hip and went back in on the 9th for seizures. April 13, 2017 [address]. 2. Have you seen or consulted any other health care providers outside of the [address] [place] [address] since your last visit (Include any pap smears or colon screening)? NO      Do you have an Advanced Directive? NO    Would you like information on Advanced Directives?  NO

## 2017-05-17 NOTE — PATIENT INSTRUCTIONS

## 2017-05-17 NOTE — MR AVS SNAPSHOT
Visit Information Date & Time Provider Department Dept. Phone Encounter #  
 5/17/2017 10:30 AM Jolie Thompson MD [facility] [phone] Follow-up Instructions Return in about 3 months (around 8/17/2017), or if symptoms worsen or fail to improve. Your Appointments 8/29/2017  9:35 AM  
LAB with IOC NURSE VISIT Internist of [facility] ([address]) Appt Note: lab  
 [address], [address] [facility] [address] [address]  
  
   
 [address], [address]  
  
    
 9/5/2017 10:00 AM  
Office Visit with Jolie Thompson MD  
Internist of [facility] [address]) Appt Note: ov bs  
 [address] [facility], [facility] [address] [address]  
  
   
 [address], [address] Upcoming Health Maintenance Date Due INFLUENZA AGE 9 TO ADULT 8/1/2017 Pneumococcal 19-64 Highest Risk (3 of 3 - PCV13) 10/13/2017 MEDICARE YEARLY EXAM 3/3/2018 BREAST CANCER SCRN MAMMOGRAM 10/13/2018 PAP AKA CERVICAL CYTOLOGY 11/17/2018 COLONOSCOPY 1/10/2023 DTaP/Tdap/Td series (2 - Td) 3/2/2027 Allergies as of 5/17/2017  Review Complete On: 5/17/2017 By: Vero Acosta Severity Noted Reaction Type Reactions Percocet [Oxycodone-acetaminophen]    Itching, Hives Percodan [Oxycodone-aspirin]  05/21/2014    Rash, Itching Plavix [Clopidogrel]  04/10/2013    Rash, Hives Current Immunizations  Reviewed on 10/13/2016 Name Date Influenza Vaccine (Quad) PF 10/13/2016  3:26 PM, 11/17/2015  3:55 PM  
 Influenza Vaccine PF 11/4/2014, 10/21/2013  1:03 PM  
 Pneumococcal Polysaccharide (PPSV-23) 10/13/2016  3:52 PM  
  
 Not reviewed this visit You Were Diagnosed With   
  
 Codes Comments Iron deficiency anemia, unspecified iron deficiency anemia type    -  Primary ICD-10-CM: D50.9 ICD-9-CM: 280.9 Severe protein-calorie malnutrition ([facility]     ICD-10-CM: T70 ICD-9-CM: 336 Essential hypertension     ICD-10-CM: I10 
ICD-9-CM: 401.9 Squamous cell carcinoma of oropharynx (HCC)     ICD-10-CM: C10.9 ICD-9-CM: 146. 9 Acquired hypothyroidism     ICD-10-CM: E03.9 ICD-9-CM: 244.9 Age-related osteoporosis with current pathological fracture, sequela     ICD-10-CM: M80.00XS ICD-9-CM: 733.01, 733.10 Vitamin D deficiency     ICD-10-CM: E55.9 ICD-9-CM: 268.9 Vitals BP Pulse Temp Height(growth percentile) Weight(growth percentile) SpO2  
 130/70 (!) 116 98.2 °F (36.8 °C) (Oral) 5' 2\" (1.575 m) 85 lb 12.8 oz (38.9 kg) 95% BMI OB Status Smoking Status 15.69 kg/m2 Postmenopausal Current Every Day Smoker Vitals History BMI and BSA Data Body Mass Index Body Surface Area  
 15.69 kg/m 2 1.3 m 2 Preferred Pharmacy Pharmacy Name Phone [address], [address] [place] [address] [phone] Your Updated Medication List  
  
   
This list is accurate as of: 5/17/17 11:43 AM.  Always use your most recent med list.  
  
  
  
  
 albuterol 2.5 mg /3 mL (0.083 %) nebulizer solution Commonly known as:  PROVENTIL VENTOLIN  
2.5 mg by Nebulization route as needed for Wheezing. aspirin 81 mg chewable tablet 81 mg by PEG Tube route daily. cephalexin 250 mg Tab Take 250 mg by mouth three (3) times daily. Indications: KLEBSIELLA URINARY TRACT INFECTION  
  
 cyanocobalamin 500 mcg tablet Commonly known as:  VITAMIN B12  
500 mcg by PEG Tube route daily. Via peg  
  
 cyclobenzaprine 5 mg tablet Commonly known as:  FLEXERIL Take 1 Tab by mouth three (3) times daily as needed for Muscle Spasm(s). gabapentin 300 mg capsule Commonly known as:  NEURONTIN  
300 mg by PEG Tube route three (3) times daily. lactobacillus rhamnosus gg 10 billion cell 10 billion cell capsule Commonly known as:  CULTURELLE  
1 Cap by Per G Tube route Before breakfast and dinner. levETIRAcetam 100 mg/ml Soln oral solution Commonly known as:  KEPPRA 10 mL by Per G Tube route two (2) times a day. levothyroxine 75 mcg tablet Commonly known as:  synthroid  
0.5 Tabs by Per G Tube route Daily (before breakfast). Indications: hypothyroidism  
  
 lidocaine HCl 3 % topical cream  
Commonly known as:  XYLOCAINE Apply  to affected area two (2) times a day. Indications: WITH DESITIN  
  
 melatonin 3 mg tablet 1 Tab by Per G Tube route nightly as needed. morphine 10 mg/5 mL oral solution 2.5ml or 5ml by peg tube every 8 hours as needed for pain  
  
 multivitamin liquid Commonly known as:  THERAGRAN  
10 mL by PEG Tube route daily. omeprazole 2.5 mg Sudr delayed release suspension Commonly known as:  PRILOSEC  
20 mg by Per G Tube route two (2) times a day. OXYGEN-AIR DELIVERY SYSTEMS  
2 L/min by Nasal route as needed. potassium chloride 10 mEq tablet Commonly known as:  KLOR-CON 10 mEq by Feeding Tube route daily. VITAMIN D3 2,000 unit Tab Generic drug:  cholecalciferol (vitamin D3) 1 Tab by PEG Tube route daily. Follow-up Instructions Return in about 3 months (around 8/17/2017), or if symptoms worsen or fail to improve. Patient Instructions Preventing Falls: Care Instructions Your Care Instructions Getting around your home safely can be a challenge if you have injuries or health problems that make it easy for you to fall. Loose rugs and furniture in walkways are among the dangers for many older people who have problems walking or who have poor eyesight. People who have conditions such as arthritis, osteoporosis, or dementia also have to be careful not to fall. You can make your home safer with a few simple measures. Follow-up care is a key part of your treatment and safety.  Be sure to make and go to all appointments, and call your doctor if you are having problems. It's also a good idea to know your test results and keep a list of the medicines you take. How can you care for yourself at home? Taking care of yourself · You may get dizzy if you do not drink enough water. To prevent dehydration, drink plenty of fluids, enough so that your urine is light yellow or clear like water. Choose water and other caffeine-free clear liquids. If you have kidney, heart, or liver disease and have to limit fluids, talk with your doctor before you increase the amount of fluids you drink. · Exercise regularly to improve your strength, muscle tone, and balance. Walk if you can. Swimming may be a good choice if you cannot walk easily. · Have your vision and hearing checked each year or any time you notice a change. If you have trouble seeing and hearing, you might not be able to avoid objects and could lose your balance. · Know the side effects of the medicines you take. Ask your doctor or pharmacist whether the medicines you take can affect your balance. Sleeping pills or sedatives can affect your balance. · Limit the amount of alcohol you drink. Alcohol can impair your balance and other senses. · Ask your doctor whether calluses or corns on your feet need to be removed. If you wear loose-fitting shoes because of calluses or corns, you can lose your balance and fall. · Talk to your doctor if you have numbness in your feet. Preventing falls at home · Remove raised doorway thresholds, throw rugs, and clutter. Repair loose carpet or raised areas in the floor. · Move furniture and electrical cords to keep them out of walking paths. · Use nonskid floor wax, and wipe up spills right away, especially on ceramic tile floors. · If you use a walker or cane, put rubber tips on it. If you use crutches, clean the bottoms of them regularly with an abrasive pad, such as steel wool. · Keep your house well lit, especially [facility], and outside walkways. Use night-lights in areas such as hallways and bathrooms. Add extra light switches or use remote switches (such as switches that go on or off when you clap your hands) to make it easier to turn lights on if you have to get up during the night. · Install sturdy handrails on stairways. · Move items in your cabinets so that the things you use a lot are on the lower shelves (about waist level). · Keep a cordless phone and a flashlight with new batteries by your bed. If possible, put a phone in each of the main rooms of your house, or carry a cell phone in case you fall and cannot reach a phone. Or, you can wear a device around your neck or wrist. You push a button that sends a signal for help. · Wear low-heeled shoes that fit well and give your feet good support. Use footwear with nonskid soles. Check the heels and soles of your shoes for wear. Repair or replace worn heels or soles. · Do not wear socks without shoes on wood floors. · Walk on the grass when the sidewalks are slippery. If you live in an area that gets snow and ice in the winter, sprinkle salt on slippery steps and sidewalks. Preventing falls in the bath · Install grab bars and nonskid mats inside and outside your shower or tub and near the toilet and sinks. · Use shower chairs and bath benches. · Use a hand-held shower head that will allow you to sit while showering. · Get into a tub or shower by putting the weaker leg in first. Get out of a tub or shower with your strong side first. 
· Repair loose toilet seats and consider installing a raised toilet seat to make getting on and off the toilet easier. · Keep your bathroom door unlocked while you are in the shower. Where can you learn more? Go to http://dayron-raj.info/. Enter [phone] in the search box to learn more about \"Preventing Falls: Care Instructions. \" Current as of: August 4, 2016 Content Version: 11.2 © 2743-3228 [employer]. Care instructions adapted under license by [employer] (which disclaims liability or warranty for this information). If you have questions about a medical condition or this instruction, always ask your healthcare professional. [address] any warranty or liability for your use of this information. Introducing [address]! [facility] introduces [employer] patient portal. Now you can access parts of your medical record, email your doctor's office, and request medication refills online. 1. In your internet browser, go to [email or web address] 2. Click on the First Time User? Click Here link in the Sign In box. You will see the New Member Sign Up page. 3. Enter your [employer] Access Code exactly as it appears below. You will not need to use this code after youve completed the sign-up process. If you do not sign up before the expiration date, you must request a new code. · [employer] Access Code: [email or web address] Expires: 8/15/2017 11:43 AM 
 
4. Enter the last four digits of your Social Security Number (xxxx) and Date of Birth (mm/dd/yyyy) as indicated and click Submit. You will be taken to the next sign-up page. 5. Create a [employer] ID. This will be your [employer] login ID and cannot be changed, so think of one that is secure and easy to remember. 6. Create a [employer] password. You can change your password at any time. 7. Enter your Password Reset Question and Answer. This can be used at a later time if you forget your password. 8. Enter your e-mail address. You will receive e-mail notification when new information is available in [address] 9. Click Sign Up. You can now view and download portions of your medical record. 10. Click the Download Summary menu link to download a portable copy of your medical information. If you have questions, please visit the Frequently Asked Questions section of the [employer] website. Remember, [employer] is NOT to be used for urgent needs. For medical emergencies, dial 911. Now available from your iPhone and Android! Please provide this summary of care documentation to your next provider. Your primary care clinician is listed as Rosi Cuevas. If you have any questions after today's visit, please call [phone].

## 2017-05-18 ENCOUNTER — TELEPHONE (OUTPATIENT)
Dept: INTERNAL MEDICINE CLINIC | Age: 62
End: 2017-05-18

## 2017-05-18 LAB — 25(OH)D3 SERPL-MCNC: 31.6 NG/ML (ref 30–100)

## 2017-05-18 NOTE — TELEPHONE ENCOUNTER
Please let the patient know that the blood work drawn at her visit showed that her anemia and iron levels have  significantly improved. Kidney and liver function are stable. Thyroid function is good on current dose of Synthroid. Vitamin D level is normal. She should continue current dose of supplement.

## 2017-05-21 PROBLEM — R56.9 SEIZURES (HCC): Status: ACTIVE | Noted: 2017-05-21

## 2017-05-21 PROBLEM — M80.00XA AGE-RELATED OSTEOPOROSIS WITH CURRENT PATHOLOGICAL FRACTURE: Status: ACTIVE | Noted: 2017-05-21

## 2017-05-21 NOTE — PROGRESS NOTES
HPI:   Lico Ray is a 63y.o. year old female who presents today for post hospitalization follow-up. She is accompanied by her . She has a history of squamous cell carcinoma of the oropharynx, COPD, hypertension, PAD, carotid artery disease, mesenteric ischemia, pulmonary nodules, chronic headaches, and carpal tunnel syndrome. She was admitted to [facility] from 4/7-4/9/2017 after tripping at home and developing right hip pain. She was found to have a non-displaced comminuted fracture of the greater trochanter of the right femur. Orthopedics was consulted and non-surgical intervention and patient was subsequently discharged with plans for home physical therapy. However, several hours after getting home, patient developed two seizure like episodes manifesting as a chewing motion with upper arm shaking. After the second episode, she became unresponsive, EMS was called and she was transported back to [facility]. While in the ED, she had multiple episodes of staring with right facial twitching, and while being evaluated by Dr. Reynaldo Salcido of neurology, she developed a generalized tonic-clonic seizure with deviation of her eyes and head/neck to the right. She was loaded with Keppra, and the seizure was terminated. She did remain post-ictal for some time after episode. Evaluation in the ED included a stat EEG which was obtained after the seizure, and it showed interictal epileptiform discharges. Head CT scan showed atrophy and periventricular microvascular ischemia without acute changes. Chest x-ray was negative. Urine culture was positive for Klebsiella pneumoniae and she was treated with IV ceftriaxone and transitioned to po Keflex. Repeat EEG (4/10/2017) was normal without focal slowing or epileptiform activity. She remained seizure free on Keppra, and was discharged on 4/13/2017 to [facility]. She returned home on 4/25/2017.  She reports that she has been receiving home health care and physical therapy twice per week, and is now able to ambulate with a walker. She states that her pain is minimal. She has lost five pounds since 5/2017, and reports that she was started on [employer] high protein nutritional supplement while in the hospital. However, she has been having difficulty finding it and is requesting that a prescription be faxed to [address]. She has had no further seizure activity and is otherwise without complaints. 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 She underwent panendoscopy with biopsy, PEG tube placement and tracheostomy on 5/13/2013. She was subsequently treated with radiation and chemotherapy (Taxol and Cisplatin), completed 8/6/2013. She has had no clinical evidence of recurrent disease, with negative serial PET scans/ CT scans of the chest and neck. A follow-up PET scan was obtained in 2/4/2016 which revealed no tumor activity in the head or neck, but multifocal patchy nodular activity in the lungs (right > left), could be inflammatory although could also be consistent with metastases. A chest CT scan was obtained 2/26/16 revealing new bilateral pulmonary nodules, also consistent with inflammation vs. metastases. She had a repeat chest CT scan on 6/21/2016, which showed that all of the lung nodular densities had improved in appearance and decreased in size, with no new or enlarging pulmonary nodules or enlarged lymph nodes demonstrated.  She also had a neck CT scan (6/21/2016) which was negative for enlarged cervical lymph nodes, but there was an amorphous soft tissue density diffusely within the parapharyngeal fat, which was most likely post-therapy change rather than neoplastic recurrence since there was no discrete masslike focal accumulation of tissue. She had a repeat PET scan (1/21/2017) which showed marked interval improvement of multifocal hypermetabolic lesions since 9/6972, with only a mild hypermetabolic focus in the right pretracheal mediastinum, probably correlating to a tiny lymph node, clinically reactive. She also had a repeat CT scan of the neck (1/30/2017) showing no residual or recurrent mass, and CT scan of the chest (1/30/2017) showing stable right pulmonary nodules. She is being followed by Dr. Dagmar Michelle [facility]) and Dr. Kirsten Hoover (oncology). Following her XRT and chemotherapy treatment, the tracheostomy was successfully removed. Her course has been complicated by esophageal stricture and aspiration, requiring G tube placement. She underwent multiple endoscopic dilatations under fluoroscopy using rendezvous techniques (10/2014) with reestablishment of continuity with her upper and lower esophagus. However, it was noted that she was having significant aspiration with both solids and liquids, due to poor laryngeal mobility from epiglottic and laryngeal scarring s/p XRT. In 11/29/2015, she was found to have a posterior left upper lobe cavitary mass on chest CT scan. She was admitted to [facility] and underwent bronchoscopy and bronchoalveolar lavage, which revealed no endobronchial obstruction or nodules and pathology was negative for malignant cells. Her QuantiFERON Gold was negative for TB. Cultures were positive for MSSA and pseudomonas and she was diagnosed with a left upper lobe abscess, thought to be secondary to aspiration. She was discharged home on 12/9/2015 with a PICC line and was treated with nafcillin and meropenem. She was again hospitalized at [facility] on 12/26/2015 when she presented with melena and leakage from PEG site and was found to have a Hb of 6.1. She was transfused and due to persistent leakage, had her G tube converted to a G-J tube by interventional radiology. She had severe hypokalemia, which required multiple IV/ per tube dosing.  She underwent endoscopy by Dr. Юлия Ferreira, but he was only able to pass the scope into the pharynx since the upper esophageal sphincter was completely fused shut. ENT was consulted about possibly attempting to open the proximal esophagus, and it was felt that the risk of perforation and mediastinal infection was too high, especially since she had adequete enteral access for nutrition. She was admitted to [facility] from 1/8/2016 to 1/22/2016 for a recurrent upper G-I bleed (Hb 6.8 requiring 4 U of PRBCs), acute hypoxic respiratory failure with interstitial pulmonary edema and bilateral pleural effusions, and candidemia (most likely from PICC line). She had an echocardiogram (1/15/2016) which showed normal LV size and function (EF 65%). Thoracentesis revealed effusions were transudative. She was treated with fluconazole, and had her G-J tube converted back to a G-Tube. She had been having difficulty with her G-tube cracking or becoming loose and falling out, but this resolved after she had the tube replaced with a larger tube in 2/2017 by TALHA Simon. She also has a history of peripheral vascular disease and presented in 2/2013 with weight loss and post-prandial pain. 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 Melissa Guerra. She again represented with abdominal complaints in 4/2013 and was thought to have stent restenosis since she was not taking clopidogrel due to itching. In 5/2013, she underwent a celiac arteriogram which showed that the stent was patent. In 6/2015 and 6/2016, an abdominal duplex scan showed >70% stenosis of the celiac artery, but she remains asymptomatic. In 6/21/2016, surveillance neck CT scan showed high grade bilateral internal carotid arterial stenoses. A carotid duplex scan (6/24/2016) confirmed severe (>70%) right internal carotid artery stenosis and moderate (50-69%) left internal carotid artery stenosis.  On 7/20/2016, she underwent a subclavian, cerebral, vertebral, and carotid arteriogram by Dr. Melissa Guerra, which revealed multiple areas of atherosclerosis, but no significant stenoses; the right internal carotid artery had a 50% narrowing noted while all other vessels were patent. She continues to be maintained on aspirin. According to the chart, she has a history of hepatitis C and cirrhosis. However, review of records show negative hepatitis B and C panels in 3/2014 at [facility], and CT scans of abdomen show a normal liver in 5/9/2015 and 11/9/2015. She had a screening colonoscopy in 1/2013 by Dr. Tara Wagner which was normal.     She has a history of hypertension that was treated in the past with lisinopril. She no longer requires medication. She also has a history of hypothyroidism and is on Synthroid. Denies any cold intolerance, hair or skin changes. She has a history of osteopenia diagnosed in 2010, with bone density scan (3/2017) showing progression to osteoporosis with T-scores: femoral neck  left -3.0 / right -2.4, and lumbar -0.9. She was treated with alendronate for one year, but she discontinued it due to throat irritation. She continues to take calcium and Vitamin D. She was referred to see Dr. Mirela Davis in 4/2017 for recommendations regarding treatment, given her prior head and neck irradiation and concern for possible increased risk for osteonecrosis with biphosphonate treatment. However, before she could make the appointment, she was hospitalized as discussed above. She has a history of suspected COPD, with a long history of smoking 1-2 ppd. She was being treated with Flovent and albuterol-ipratropium (Duo-neb) nebulizers; however, she states that she has not needed to use these recently. She denies any shortness of breath currently, but continues to smoke at least 1 ppd. She was being followed by Dr. Charlotte Turner for complaints of daily headaches and numbness and tingling in her left hand, attributed to probable migraines. She is being treated with gabapentin and was instructed to use a wrist splint for probable carpal tunnel syndrome.      Past Medical History:   Diagnosis Date    Cigarette smoker     COPD (chronic obstructive [facility] disease) [facility]     Esophageal stricture 2014    s/p XRT for oropharyngeal cancer; requiring GJ tube for nutrition.  Hip fracture [facility] 04/07/2017    Hypertension     Hypothyroidism     Melanoma ([facility]     Mesenteric ischemia ([facility]     Oropharyngeal cancer ([facility] 5/2013    [id number] squamous cell carcinoma of posterion pharynx and tonsil s/p XRT and chemo.  Osteopenia     Panic disorder     Peptic ulcer disease     Stenosis of celiac artery ([facility] 2/2013    s/p stent    Vitamin D deficiency      Past Surgical History:   Procedure Laterality Date    ABDOMEN SURGERY PROC UNLISTED      \"ulcer\" surgery    HX ENDOSCOPY  5/9/2014    w/ dilation    HX ENDOSCOPY  5/13/2014    w/ dilation    HX ENDOSCOPY  5/21    with Dilatation    HX ENDOSCOPY  6/4/2014    w/ dilation    HX GI      HX ORTHOPAEDIC      intramedullary chao fixation of the right tiba    HX OTHER SURGICAL      skin cancer removal from right shoulder; PEG TUBE    HX TRACHEOSTOMY       Current Outpatient Prescriptions   Medication Sig    cyclobenzaprine (FLEXERIL) 5 mg tablet Take 1 Tab by mouth three (3) times daily as needed for Muscle Spasm(s).  levETIRAcetam (KEPPRA) 100 mg/ml soln oral solution 10 mL by Per G Tube route two (2) times a day.  levothyroxine (SYNTHROID) 75 mcg tablet 0.5 Tabs by Per G Tube route Daily (before breakfast). Indications: hypothyroidism    morphine 10 mg/5 mL oral solution 2.5ml or 5ml by peg tube every 8 hours as needed for pain    melatonin 3 mg tablet 1 Tab by Per G Tube route nightly as needed.  albuterol (PROVENTIL VENTOLIN) 2.5 mg /3 mL (0.083 %) nebulizer solution 2.5 mg by Nebulization route as needed for Wheezing.  cholecalciferol, vitamin D3, (VITAMIN D3) 2,000 unit tab 1 Tab by PEG Tube route daily.  omeprazole (PRILOSEC) 2.5 mg suDR delayed release suspension 20 mg by Per G Tube route two (2) times a day.     lidocaine HCl (XYLOCAINE) 3 % topical cream Apply  to affected area two (2) times a day. Indications: WITH DESITIN    aspirin 81 mg chewable tablet 81 mg by PEG Tube route daily.  gabapentin (NEURONTIN) 300 mg capsule 300 mg by PEG Tube route three (3) times daily.  cyanocobalamin (VITAMIN B12) 500 mcg tablet 500 mcg by PEG Tube route daily. Via peg     multivitamin (THERAGRAN) liquid 10 mL by PEG Tube route daily.  potassium chloride (K-DUR, KLOR-CON) 10 mEq tablet 10 mEq by Feeding Tube route daily.  lactobacillus rhamnosus gg 10 billion cell (CULTURELLE) 10 billion cell capsule 1 Cap by Per G Tube route Before breakfast and dinner.  OXYGEN-AIR DELIVERY SYSTEMS 2 L/min by Nasal route as needed. No current facility-administered medications for this visit. Allergies and Intolerances: Allergies   Allergen Reactions    Percocet [Oxycodone-Acetaminophen] Itching and Hives    Percodan [Oxycodone-Aspirin] Rash and Itching    Plavix [Clopidogrel] Rash and Hives     Family History: No FH of breast or colon cancer. Sister had uterine cancer. Family History   Problem Relation Age of Onset    Heart Disease Mother      Social History: She is  living with her . She has no children. She retired in 2013 from being the  at the [employer]. She  reports that she has been smoking Cigarettes. She has been smoking about 0.50 packs per day. She has never used smokeless tobacco.   History   Alcohol Use    8.4 oz/week    14 Cans of beer, 0 Standard drinks or equivalent per week     Immunization History:   Immunization History   Administered Date(s) Administered    Influenza Vaccine (Quad) PF 11/17/2015, 10/13/2016    Influenza Vaccine PF 10/21/2013, 11/04/2014    Pneumococcal Polysaccharide (PPSV-23) 10/13/2016    Tdap 03/06/2017       Review of Systems:   As above included in HPI.   Otherwise 11 point review of systems negative including constitutional, skin, HENT, eyes, respiratory, cardiovascular, gastrointestinal, genitourinary, musculoskeletal, endo/heme/aller, neurological.    Physical:   Vitals:   BP: 130/70  HR: (!) 116  WT: 85 lb 12.8 oz (38.9 kg)  BMI:  15.69 kg/m2    Exam:   Pt appears well; alert and oriented x 3; appropriate affect. HEENT: PERRLA, anicteric, oropharynx clear, no JVD, adenopathy or thyromegaly. No carotid bruits or radiated murmur. Lungs: decreased breath sounds bilaterally, no wheezes, rhonchi, or rales. Heart: regular rate and rhythm. Tachycardic. No murmur, rubs, gallops  Abdomen: soft, nontender, nondistended, normal bowel sounds, no hepatosplenomegaly or masses. Extremities: without edema. Pulses 1-2+ bilaterally. Review of Data:  Labs:  No visits with results within 1 Month(s) from this visit. Latest known visit with results is:    Admission on 04/09/2017, Discharged on 04/13/2017   Component Date Value Ref Range Status    Ventricular Rate 04/09/2017 89  BPM Final    Atrial Rate 04/09/2017 89  BPM Final    P-R Interval 04/09/2017 142  ms Final    QRS Duration 04/09/2017 70  ms Final    Q-T Interval 04/09/2017 376  ms Final    QTC Calculation (Bezet) 04/09/2017 457  ms Final    Calculated P Axis 04/09/2017 79  degrees Final    Calculated R Axis 04/09/2017 71  degrees Final    Calculated T Axis 04/09/2017 65  degrees Final    Diagnosis 04/09/2017    Final                    Value:  Normal ECG  Nonspecific T wave abnormality Anteroseptal leads  When compared with ECG of 07-APR-2017 13:10,  No significant change was found  Confirmed by Juan Carlos Whittington M.D., Gail Bynum.  ([id number]) on 4/10/2017 7:28:05 AM      WBC 04/09/2017 7.6  4.0 - 11.0 1000/mm3 Final    RBC 04/09/2017 2.91* 3.60 - 5.20 M/uL Final    HGB 04/09/2017 9.3* 13.0 - 17.2 gm/dl Final    HCT 04/09/2017 28.6* 37.0 - 50.0 % Final    MCV 04/09/2017 98.3* 80.0 - 98.0 fL Final    MCH 04/09/2017 32.0  25.4 - 34.6 pg Final    [facility] 04/09/2017 32.5  30.0 - 36.0 gm/dl Final    PLATELET 00/56/9604 135* 140 - 450 1000/mm3 Final    MPV 04/09/2017 11.9* 6.0 - 10.0 fL Final    RDW-SD 04/09/2017 56.5* 36.4 - 46.3   Final    NRBC 04/09/2017 0  0 - 0   Final    IMMATURE GRANULOCYTES 04/09/2017 0.3  0.0 - 3.0 % Final    NEUTROPHILS 04/09/2017 87.5* 34 - 64 % Final    LYMPHOCYTES 04/09/2017 4.0* 28 - 48 % Final    MONOCYTES 04/09/2017 7.0  1 - 13 % Final    EOSINOPHILS 04/09/2017 0.4  0 - 5 % Final    BASOPHILS 04/09/2017 0.8  0 - 3 % Final    Sodium 04/09/2017 139  136 - 145 mEq/L Final    Potassium 04/09/2017 3.6  3.5 - 5.1 mEq/L Final    Chloride 04/09/2017 108* 98 - 107 mEq/L Final    CO2 04/09/2017 22  21 - 32 mEq/L Final    Glucose 04/09/2017 89  74 - 106 mg/dl Final    BUN 04/09/2017 14  7 - 25 mg/dl Final    Creatinine 04/09/2017 0.5* 0.6 - 1.3 mg/dl Final    GFR est AA 04/09/2017 >60.0    Final    GFR est non-AA 04/09/2017 >60    Final    Calcium 04/09/2017 7.8* 8.5 - 10.1 mg/dl Final    AST (SGOT) 04/09/2017 26  15 - 37 U/L Final    ALT (SGPT) 04/09/2017 19  12 - 78 U/L Final    Alk.  phosphatase 04/09/2017 124* 45 - 117 U/L Final    Bilirubin, total 04/09/2017 0.7  0.2 - 1.0 mg/dl Final    Protein, total 04/09/2017 6.0* 6.4 - 8.2 gm/dl Final    Albumin 04/09/2017 2.6* 3.4 - 5.0 gm/dl Final    Troponin-I 04/09/2017 0.016  0.000 - 0.045 ng/ml Final    Glucose 04/09/2017 Negative  NEGATIVE,Negative mg/dl Final    Bilirubin 04/09/2017 Negative  NEGATIVE,Negative   Final    Ketone 04/09/2017 Negative  NEGATIVE,Negative mg/dl Final    Specific gravity 04/09/2017 <=1.005  1.005 - 1.030   Final    Blood 04/09/2017 Trace-lysed* NEGATIVE,Negative   Final    pH (UA) 04/09/2017 6.0  5 - 9   Final    Protein 04/09/2017 Negative  NEGATIVE,Negative mg/dl Final    Urobilinogen 04/09/2017 0.2  0.0 - 1.0 EU/dl Final    Nitrites 04/09/2017 Negative  NEGATIVE,Negative   Final    Leukocyte Esterase 04/09/2017 Small* NEGATIVE,Negative   Final    Color 04/09/2017 Yellow    Final    Appearance 04/09/2017 Clear    Final    WBC 04/09/2017 15-29  /HPF Final    RBC 04/09/2017 OCCASIONAL  /HPF Final    Bacteria 04/09/2017 3+  /HPF Final    Isolate 04/09/2017 *   Final                    Value:>100,000 CFU/mL  Klebsiella pneumoniae ssp pneumoniae      Troponin-I 04/10/2017 <0.015  0.000 - 0.045 ng/ml Final    CALCIUM,IONIZED 04/10/2017 4.9  4.4 - 5.4 mg/dl Final    Magnesium 04/09/2017 2.0  1.6 - 2.6 mg/dl Final    Troponin-I 04/10/2017 0.018  0.000 - 0.045 ng/ml Final    Sodium 04/11/2017 139  136 - 145 mEq/L Final    Potassium 04/11/2017 3.6  3.5 - 5.1 mEq/L Final    Chloride 04/11/2017 103  98 - 107 mEq/L Final    CO2 04/11/2017 25  21 - 32 mEq/L Final    Glucose 04/11/2017 70* 74 - 106 mg/dl Final    BUN 04/11/2017 9  7 - 25 mg/dl Final    Creatinine 04/11/2017 0.5* 0.6 - 1.3 mg/dl Final    GFR est AA 04/11/2017 >60.0    Final    GFR est non-AA 04/11/2017 >60    Final    Calcium 04/11/2017 9.2  8.5 - 10.1 mg/dl Final    Albumin 04/11/2017 2.7* 3.4 - 5.0 gm/dl Final    Phosphorus 04/11/2017 2.8  2.5 - 4.9 mg/dl Final    Magnesium 04/11/2017 2.2  1.6 - 2.6 mg/dl Final    WBC 04/11/2017 7.6  4.0 - 11.0 1000/mm3 Final    RBC 04/11/2017 2.98* 3.60 - 5.20 M/uL Final    HGB 04/11/2017 9.2* 13.0 - 17.2 gm/dl Final    HCT 04/11/2017 29.4* 37.0 - 50.0 % Final    MCV 04/11/2017 98.7* 80.0 - 98.0 fL Final    MCH 04/11/2017 30.9  25.4 - 34.6 pg Final    MCHC 04/11/2017 31.3  30.0 - 36.0 gm/dl Final    PLATELET 00/83/8338 012  140 - 450 1000/mm3 Final    MPV 04/11/2017 12.1* 6.0 - 10.0 fL Final    RDW-SD 04/11/2017 56.2* 36.4 - 46.3   Final    NRBC 04/11/2017 0  0 - 0   Final    IMMATURE GRANULOCYTES 04/11/2017 0.4  0.0 - 3.0 % Final    NEUTROPHILS 04/11/2017 84.7* 34 - 64 % Final    LYMPHOCYTES 04/11/2017 5.3* 28 - 48 % Final    MONOCYTES 04/11/2017 7.7  1 - 13 % Final    EOSINOPHILS 04/11/2017 0.8  0 - 5 % Final    BASOPHILS 04/11/2017 1.1  0 - 3 % Final    Glucose (POC) 04/12/2017 74  65 - 105 mg/dL Final    C. diff toxin by PCR 04/12/2017 Toxigenic C. difficile NEGATIVE  Toxigenic C. difficile NEGATIVE   Final    Sodium 04/13/2017 140  136 - 145 mEq/L Final    Potassium 04/13/2017 3.5  3.5 - 5.1 mEq/L Final    Chloride 04/13/2017 105  98 - 107 mEq/L Final    CO2 04/13/2017 26  21 - 32 mEq/L Final    Glucose 04/13/2017 109* 74 - 106 mg/dl Final    BUN 04/13/2017 9  7 - 25 mg/dl Final    Creatinine 04/13/2017 0.6  0.6 - 1.3 mg/dl Final    GFR est AA 04/13/2017 >60.0    Final    GFR est non-AA 04/13/2017 >60    Final    Calcium 04/13/2017 9.4  8.5 - 10.1 mg/dl Final    Albumin 04/13/2017 2.5* 3.4 - 5.0 gm/dl Final    Phosphorus 04/13/2017 2.9  2.5 - 4.9 mg/dl Final    Magnesium 04/13/2017 2.0  1.6 - 2.6 mg/dl Final    WBC 04/13/2017 6.9  4.0 - 11.0 1000/mm3 Final    RBC 04/13/2017 2.92* 3.60 - 5.20 M/uL Final    HGB 04/13/2017 9.0* 13.0 - 17.2 gm/dl Final    HCT 04/13/2017 28.5* 37.0 - 50.0 % Final    MCV 04/13/2017 97.6  80.0 - 98.0 fL Final    MCH 04/13/2017 30.8  25.4 - 34.6 pg Final    MCHC 04/13/2017 31.6  30.0 - 36.0 gm/dl Final    PLATELET 61/49/7242 110  140 - 450 1000/mm3 Final    MPV 04/13/2017 11.2* 6.0 - 10.0 fL Final    RDW-SD 04/13/2017 55.8* 36.4 - 46.3   Final       Imaging: none    Health Maintenance:  Screening:    Mammogram: negative (10/2016)   PAP smear: s/p PRASHANTH. No further screening. Colorectal: colonoscopy (1/2013) normal. Dr. Jennifer Jauregui. Due 2023.    Depression: none   DM (HbA1c/FPG): FPG 70 (4/2017)   Hepatitis C: negative (3/2014) at [address]: none   DEXA: osteoporosis (3/2017)   Smoking: resumed smoking 1 ppd   Vitamin D: 41.5 (2/2017)   Medicare Wellness: 3/2/2017      Impression:  Patient Active Problem List   Diagnosis Code    Melanoma right scapula C43.9    COPD (chronic obstructive pulmonary disease) ([facility] J44.9    Carotid artery disease ([facility] bilateral moderate I77.9    Celiac artery stenosis, status post stent I77.4    Severe protein-calorie malnutrition ([facility]) E43    Squamous cell carcinoma of oropharynx, with PEG tube and tracheostomy C10.9    Anemia D64.9    Esophageal stricture K22.2    Vitamin D deficiency E55.9    Acquired hypothyroidism E03.9    Hyponatremia E87.1    Carpal tunnel syndrome, left G56.02    GI bleed K92.2    Dysphagia, 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    S/P percutaneous endoscopic gastrostomy (PEG) tube placement ([facility]) Z93.1    History of radiation to head and neck region Z92.3    Current smoker F17.200    Essential hypertension I10    Multiple pulmonary nodules R91.8    Closed avulsion fracture of greater trochanter of femur ([facility]) S72.113A    Seizures ([facility]) R56.9    Age-related osteoporosis with current pathological fracture M80.00XA       Plan:  1. Seizures. Unclear trigger, but concerning as presented with multiple witnessed episodes in ED thought to represent status epilepticus requiring loading with Keppra to terminate. Has had no recurrence of seizures since Keppra initiated. Head CT scan was negative, but she did not have brain MRI as discussed in hospital. She and her  are questioning the need to continue anti-seizure medication. Will refer to neurology to assist with management. Referral placed for Dr. Sumanth Burger. 2. S/P nondisplaced fracture of right femur greater trochanter. Doing well with physical therapy, gradually increasing weight bearing and ambulation. Follow. 3. Klebsiella UTI. Resolved. Completed course of Keflex. 4. Oropharyngeal carcinoma s/p XRT/chemo. Currently in remission. Recent PET scan and neck/chest CT scans with stable pulmonary nodules, and otherwise without clinical evidence of active disease. She is being followed closely by Dr. Saira Dye at [facility] and Dr. Dalton Crawford. 5. Esophageal stricture. Currently receiving all nutrition via G-tube. On intermittent feeds. Weight decreased five pounds since hospitalization. Severe protein calorie malnutrition.  Will fax prescription for [employer] protein supplement as recommended during hospitalization. Continue to follow. 6. Osteoporosis. Significant progression of disease since prior exam in 2010, particularly in right femoral neck. Now with fracture of right proximal femur following mechanical fall. Unclear if safe to treat with biphosphonates given prior head and neck irradiation. Concern for possible increase risk of osteonecrosis with biphosphonate or denosumab use. Will refer to Dr. Pastor Pedroza for evaluation and recommendations regarding best course of therapy. 7. Iron deficiency anemia. Had improved, but anemic again in hospital. Will reassess today. Previously was secondary to g-i loss. No longer taking iron supplement. Follow closely. 8. Hypothyroidism. TSH (2/2017) with evidence of adequate replacement on current Synthroid dose. Will reassess today. Continue to follow. 9. H/O mesenteric ischemia. 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 Continue to follow. 10. Carotid stenoses, bilateral. Duplex scan revealing severe stenoses bilaterally, but angiogram in 7/2016 by Dr. Eliane Christie did not confirm this. Difficulty with duplex scan most likely due to prior neck irradiation. Follow. 11. COPD. Currently well controlled. On Flovent Diskus but reports not needing to use duoneb nebulizers. Followed by Dr. Jaqui Lutz. Continue to follow. 12. Hyponatremia. Intermittent, but improved during hospitalization. Most likely related to solute concentration of tube feeds +/- excess free water. Will reassess today. 13. Carpal tunnel syndrome/ headaches. Being followed by Dr. Kelley Mercado. On gabapentin and using a left arm splint. Follow. 14. Multiple pulmonary nodules. Stable on most recent CT scan in 1/2017. Being followed by Dr. Dottie Rueda. 15. Smoking cessation.  Discussed at length with the patient, including benefits of improved lung function as well as decreased risk of cardiovascular disease and cancer. Medication and non-pharmacologic options explored.  states that he will stop with her if she is willing. Encouraged to do so. However, she appears reluctant to quantify the amount of her smoking, and is vague about whether she will attempt to stop. Total time spent on topic 5 minutes. 16. Health maintenance. Received Pneumovax. Reports obtained Tdap at [facility] on 3/6/2017. Will document in chart. Vitamin D level has been normal. To continue maintenance dose supplement. Will recheck level. Mammogram up to date. Colorectal cancer screening normal in 1/2013. Follow. Total time: 40 minutes spent with the patient in face-to-face consultation of which greater than 50% was spent on counseling, answering questions and/or coordination of care. Complex medical review and management performed. Patient understands recommendations and agrees with plan. Follow-up in 3 months.

## 2017-05-24 ENCOUNTER — TELEPHONE (OUTPATIENT)
Dept: INTERNAL MEDICINE CLINIC | Age: 62
End: 2017-05-24

## 2017-05-24 NOTE — TELEPHONE ENCOUNTER
Leonidas Loza with [facility] is calling to let us know that Ms. Avitia moved to [place] with her daughter so they are doing an early D/C.    [phone]

## 2017-05-25 ENCOUNTER — TELEPHONE (OUTPATIENT)
Dept: INTERNAL MEDICINE CLINIC | Age: 62
End: 2017-05-25

## 2017-05-25 NOTE — TELEPHONE ENCOUNTER
Per Dr Jaiden Valero request, we switched the patients appointment from Dr Roberto Garibay on 6/7 to see Dr Emily Gallegos because Dr Machelle Holland in seizures, appt scheduled for 5/31 at 9:30, arrive 9am for registration.    LMTCB to let pt know of her new appt time and date, I hope she can make the appt due to the change

## 2017-05-31 ENCOUNTER — OFFICE VISIT (OUTPATIENT)
Dept: NEUROLOGY | Age: 62
End: 2017-05-31

## 2017-05-31 VITALS
SYSTOLIC BLOOD PRESSURE: 120 MMHG | TEMPERATURE: 97.1 F | WEIGHT: 85.4 LBS | OXYGEN SATURATION: 98 % | RESPIRATION RATE: 16 BRPM | HEART RATE: 116 BPM | BODY MASS INDEX: 15.72 KG/M2 | HEIGHT: 62 IN | DIASTOLIC BLOOD PRESSURE: 80 MMHG

## 2017-05-31 DIAGNOSIS — R25.9 ABNORMAL INVOLUNTARY MOVEMENT: ICD-10-CM

## 2017-05-31 DIAGNOSIS — R68.89 SPELLS OF DECREASED ATTENTIVENESS: Primary | ICD-10-CM

## 2017-05-31 DIAGNOSIS — R56.9 SEIZURES (HCC): ICD-10-CM

## 2017-05-31 RX ORDER — DIAZEPAM 10 MG/1
TABLET ORAL
Qty: 1 TAB | Refills: 0 | Status: SHIPPED | OUTPATIENT
Start: 2017-05-31 | End: 2017-06-19

## 2017-05-31 NOTE — MR AVS SNAPSHOT
Visit Information Date & Time Provider Department Dept. Phone Encounter #  
 5/31/2017  9:30 AM Benny Romo  [facility] [address] [address] [id number] Follow-up Instructions Return for Frano-After tests. Your Appointments 6/1/2017 10:30 AM  
Office Visit with [facility] ([facility]) Appt Note: [address] [place] [facility] [address] [place] [address]  
[phone] [address] [place] [address] [facility] [address] [place] [address]  
  
    
 8/29/2017  9:35 AM  
LAB with [facility] NURSE VISIT [facility] ([facility]) Appt Note: lab  
 [address], [address] [place]  
  
   
 [address], [address]  
  
    
 9/5/2017 10:00 AM  
Office Visit with Prisca Burdick MD  
Internist of [address] [place] [address]) Appt Note: ov bs  
 [address] [facility], [address]  Ser[address] Long[address] [place]  
  
   
 [address], [address] Upcoming Health Maintenance Date Due INFLUENZA AGE 9 TO ADULT 8/1/2017 Pneumococcal 19-64 Highest Risk (3 of 3 - PCV13) 10/13/2017 MEDICARE YEARLY EXAM 3/3/2018 BREAST CANCER SCRN MAMMOGRAM 10/13/2018 PAP AKA CERVICAL CYTOLOGY 11/17/2018 COLONOSCOPY 1/10/2023 DTaP/Tdap/Td series (2 - Td) 3/6/2027 Allergies as of 5/31/2017  Review Complete On: 5/31/2017 By: Swetha Mendez LPN Severity Noted Reaction Type Reactions Percocet [Oxycodone-acetaminophen]    Itching, Hives Percodan [Oxycodone-aspirin]  05/21/2014    Rash, Itching Plavix [Clopidogrel]  04/10/2013    Rash, Hives Current Immunizations  Reviewed on 10/13/2016 Name Date  Influenza Vaccine (Quad) PF 10/13/2016  3:26 PM, 11/17/2015  3:55 PM  
 Influenza Vaccine PF 11/4/2014, 10/21/2013  1:03 PM  
 Pneumococcal Polysaccharide (PPSV-23) 10/13/2016  3:52 PM  
 Tdap 3/6/2017 Not reviewed this visit You Were Diagnosed With   
  
 Codes Comments Seizures ([facility]    -  Primary ICD-10-CM: R56.9 ICD-9-CM: 780.39 Vitals BP Pulse Temp Resp Height(growth percentile) Weight(growth percentile) 120/80 (!) 116 97.1 °F (36.2 °C) (Temporal) 16 5' 2\" (1.575 m) 85 lb 6.4 oz (38.7 kg) SpO2 BMI OB Status Smoking Status 98% 15.62 kg/m2 Postmenopausal Current Every Day Smoker BMI and BSA Data Body Mass Index Body Surface Area  
 15.62 kg/m 2 1.3 m 2 Preferred Pharmacy Pharmacy Name Phone [address] [place] [address], [address] [place] [address] [phone] Your Updated Medication List  
  
   
This list is accurate as of: 5/31/17 10:43 AM.  Always use your most recent med list.  
  
  
  
  
 albuterol 2.5 mg /3 mL (0.083 %) nebulizer solution Commonly known as:  PROVENTIL VENTOLIN  
2.5 mg by Nebulization route as needed for Wheezing. aspirin 81 mg chewable tablet 81 mg by PEG Tube route daily. cyanocobalamin 500 mcg tablet Commonly known as:  VITAMIN B12  
500 mcg by PEG Tube route daily. Via peg  
  
 cyclobenzaprine 5 mg tablet Commonly known as:  FLEXERIL Take 1 Tab by mouth three (3) times daily as needed for Muscle Spasm(s). diazePAM 10 mg tablet Commonly known as:  VALIUM Take one PO 45 minutes prior to MRI-needs   
  
 gabapentin 300 mg capsule Commonly known as:  NEURONTIN  
300 mg by PEG Tube route three (3) times daily. lactobacillus rhamnosus gg 10 billion cell 10 billion cell capsule Commonly known as:  CULTURELLE  
1 Cap by Per G Tube route Before breakfast and dinner. levETIRAcetam 100 mg/ml Soln oral solution Commonly known as:  KEPPRA 10 mL by Per G Tube route two (2) times a day. levothyroxine 75 mcg tablet Commonly known as:  synthroid 0.5 Tabs by Per G Tube route Daily (before breakfast). Indications: hypothyroidism  
  
 lidocaine HCl 3 % topical cream  
Commonly known as:  XYLOCAINE Apply  to affected area two (2) times a day. Indications: WITH DESITIN  
  
 melatonin 3 mg tablet 1 Tab by Per G Tube route nightly as needed. morphine 10 mg/5 mL oral solution 2.5ml or 5ml by peg tube every 8 hours as needed for pain  
  
 multivitamin liquid Commonly known as:  THERAGRAN  
10 mL by PEG Tube route daily. omeprazole 2.5 mg Sudr delayed release suspension Commonly known as:  PRILOSEC  
20 mg by Per G Tube route two (2) times a day. OXYGEN-AIR DELIVERY SYSTEMS  
2 L/min by Nasal route as needed. potassium chloride 10 mEq tablet Commonly known as:  KLOR-CON 10 mEq by Feeding Tube route daily. PROSOURCE NO CARB 15-60 gram-kcal/30 mL Lipk Generic drug:  amino acids-protein hydrolys Take 30 mL by mouth daily. VITAMIN D3 2,000 unit Tab Generic drug:  cholecalciferol (vitamin D3) 1 Tab by PEG Tube route daily. Prescriptions Printed Refills  
 diazePAM (VALIUM) 10 mg tablet 0 Sig: Take one PO 45 minutes prior to MRI-needs  Class: Print Follow-up Instructions Return for Frano-After tests. To-Do List   
 05/31/2017 Neurology:  EEG   
  
 05/31/2017 Imaging:  MRI BRAIN W WO CONT Introducing [facility]! [facility] introduces [employer] patient portal. Now you can access parts of your medical record, email your doctor's office, and request medication refills online. 1. In your internet browser, go to https://[employer]. [employer] 2. Click on the First Time User? Click Here link in the Sign In box. You will see the New Member Sign Up page. 3. Enter your [employer] Access Code exactly as it appears below. You will not need to use this code after youve completed the sign-up process.  If you do not sign up before the expiration date, you must request a new code. · [employer] Access Code: [email or web address] Expires: 8/15/2017 11:43 AM 
 
4. Enter the last four digits of your Social Security Number (xxxx) and Date of Birth (mm/dd/yyyy) as indicated and click Submit. You will be taken to the next sign-up page. 5. Create a [employer] ID. This will be your [employer] login ID and cannot be changed, so think of one that is secure and easy to remember. 6. Create a [employer] password. You can change your password at any time. 7. Enter your Password Reset Question and Answer. This can be used at a later time if you forget your password. 8. Enter your e-mail address. You will receive e-mail notification when new information is available in [address] 9. Click Sign Up. You can now view and download portions of your medical record. 10. Click the Download Summary menu link to download a portable copy of your medical information. If you have questions, please visit the Frequently Asked Questions section of the [employer] website. Remember, [employer] is NOT to be used for urgent needs. For medical emergencies, dial 911. Now available from your iPhone and Android! Please provide this summary of care documentation to your next provider. Your primary care clinician is listed as Alyse Arshad. If you have any questions after today's visit, please call [phone].

## 2017-05-31 NOTE — PROGRESS NOTES
[address], [address], [place], [address]  [address]. [place] Daniel Rivera, [address]  Office:  [phone]  Fax: [phone]    Referring: Yahaira Shelby MD    Chief Complaint   Patient presents with   [address] [place] [address] Care     NP:Seizures. First episode 4/9/17, patient started making a strange noise, then started shaking, and slumped over. Patient states that she remembers having right side facial numbness. This is a 63year old female who presents for suspected seizure. On April 7th she had a right hip acute nondisplaced, comminuted fracture of the greater trochanter. She was not a surgery candidate and spent two days in [facility] and was discharged on the 9th to home. Back home, the patient describes feeling the right side of her face going numb. Her  then said she was making some garbling sounds at this time.  said both arms were shaking. She could not control this. She had a blank stare on her face and was not responsive. The jerking lasted about a minute and then she slumped over and stopped shaking. Her  called 46 and EMS showed up. He said she might not have been breathing right after this episode. He said this lasted about a minute. She then sat up and EMS arrived.  said she had another \"little seizure\" when EMS showed up because he heard the same garbling noise she made previously. Patient does not remember this event.  reports another episode of garbling sound in the ER.  says a total of 6 episodes in total that day. She had an EEG done and patient said it was normal and was started on Keppra in the hospital. She had a repeat EEG the next day and they said it was normal. No episodes since starting the Keppra. She she a head CT that was normal. Denies febrile seizure and no family history of seizure. In May 2013 she was diagnosed with throat and neck cancer.  She completed chemo and radiation in August 2013. Denies focal deficits. Past Medical History:   Diagnosis Date    Age-related osteoporosis with current pathological fracture 5/21/2017    Cigarette smoker     Closed avulsion fracture of greater trochanter of femur ([facility] 4/7/2017    COPD (chronic obstructive pulmonary disease) ([facility]     Esophageal stricture 2014    s/p XRT for oropharyngeal cancer; requiring GJ tube for nutrition. 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    s/p stent    Vitamin D deficiency        Past Surgical History:   Procedure Laterality Date    ABDOMEN SURGERY PROC UNLISTED      \"ulcer\" surgery    HX ENDOSCOPY  5/9/2014    w/ dilation    HX ENDOSCOPY  5/13/2014    w/ dilation    HX ENDOSCOPY  5/21    with Dilatation    HX ENDOSCOPY  6/4/2014    w/ dilation    HX GI      HX ORTHOPAEDIC      intramedullary chao fixation of the right tiba    HX OTHER SURGICAL      skin cancer removal from right shoulder; PEG TUBE    HX TRACHEOSTOMY         Current Outpatient Prescriptions   Medication Sig Dispense Refill    amino acids-protein hydrolys (PROSOURCE NO CARB) 15-60 gram-kcal/30 mL lipk Take 30 mL by mouth daily.  diazePAM (VALIUM) 10 mg tablet Take one PO 45 minutes prior to MRI-needs  1 Tab 0    cyclobenzaprine (FLEXERIL) 5 mg tablet Take 1 Tab by mouth three (3) times daily as needed for Muscle Spasm(s). 25 Tab 0    levETIRAcetam (KEPPRA) 100 mg/ml soln oral solution 10 mL by Per G Tube route two (2) times a day. 600 mL 5    levothyroxine (SYNTHROID) 75 mcg tablet 0.5 Tabs by Per G Tube route Daily (before breakfast).  Indications: hypothyroidism 15 Tab 0    morphine 10 mg/5 mL oral solution 2.5ml or 5ml by peg tube every 8 hours as needed for pain 100 mL 0    lactobacillus rhamnosus gg 10 billion cell (CULTURELLE) 10 billion cell capsule 1 Cap by Per G Tube route Before breakfast and dinner. 60 Cap 0    melatonin 3 mg tablet 1 Tab by Per G Tube route nightly as needed. 30 Tab 0    albuterol (PROVENTIL VENTOLIN) 2.5 mg /3 mL (0.083 %) nebulizer solution 2.5 mg by Nebulization route as needed for Wheezing.  cholecalciferol, vitamin D3, (VITAMIN D3) 2,000 unit tab 1 Tab by PEG Tube route daily.  omeprazole (PRILOSEC) 2.5 mg suDR delayed release suspension 20 mg by Per G Tube route two (2) times a day.  lidocaine HCl (XYLOCAINE) 3 % topical cream Apply  to affected area two (2) times a day. Indications: WITH DESITIN      aspirin 81 mg chewable tablet 81 mg by PEG Tube route daily.  gabapentin (NEURONTIN) 300 mg capsule 300 mg by PEG Tube route three (3) times daily.  cyanocobalamin (VITAMIN B12) 500 mcg tablet 500 mcg by PEG Tube route daily. Via peg       multivitamin (THERAGRAN) liquid 10 mL by PEG Tube route daily.  potassium chloride (K-DUR, KLOR-CON) 10 mEq tablet 10 mEq by Feeding Tube route daily.  OXYGEN-AIR DELIVERY SYSTEMS 2 L/min by Nasal route as needed.           Allergies   Allergen Reactions    Percocet [Oxycodone-Acetaminophen] Itching and Hives    Percodan [Oxycodone-Aspirin] Rash and Itching    Plavix [Clopidogrel] Rash and Hives       Social History   Substance Use Topics    Smoking status: Current Every Day Smoker     Packs/day: 0.50     Types: Cigarettes    Smokeless tobacco: Never Used      Comment: DENIES BUT HEAVY ODOR NOTED ON PT. THEN SPOUSE STATES OCCASSIONAL    Alcohol use 8.4 oz/week     14 Cans of beer, 0 Standard drinks or equivalent per week       Family History   Problem Relation Age of Onset    Heart Disease Mother        Review of Systems:  Pertinent positives and negatives as noted otherwise comprehensive review is negative. Physical Examination:    Visit Vitals    /80    Pulse (!) 116    Temp 97.1 °F (36.2 °C) (Temporal)    Resp 16    Ht 5' 2\" (1.575 m)    Wt 38.7 kg (85 lb 6.4 oz)    SpO2 98%    BMI 15.62 kg/m2     General: Thin and in no acute distress. Neck: Supple, nontender, no bruits  Heart: Regular rate and rhythm, no murmurs, rub, or gallop. Normal S1S2. Lungs:  Clear to auscultation bilaterally, intermittent dry cough, no wheeze  Musculoskeletal:  Extremities revealed no edema  Psych:  Good mood and bright affect    Neurological Examination:     Mental Status:   Alert and oriented to person, place, and time with recent and remote memory intact. Attention span and concentration are normal. Speech is fluent with a full fund of knowledge. Cranial Nerves:    II, III, IV, VI:  Visual acuity grossly intact. Visual fields are normal.    Pupils are equal, round, and reactive to light and accommodation. Extra-ocular movements are full and fluid. Fundoscopic exam was benign, no ptosis or nystagmus. V-XII: Hearing is grossly intact. Facial features are symmetric. The palate rises symmetrically and the tongue protrudes midline. Sternocleidomastoids 5/5. Motor Examination: Frail with decreased bulk, 4/5 muscle strength throughout. No cogwheel rigidity or clonus present. Sensory exam:  Normal proprioception. Coordination:  Finger to nose was normal.   No resting or intention tremor and no abnormal movements. Gait and Station:  Steady gait. No Rhomberg or pronator drift. No muscle wasting or fasiculations noted. Reflexes:  DTRs 2+ throughout. Toes downgoing. Impression/Plan  Garrett Patterson is a 63 y.o. female whose history and physical are consistent with spell of decreased attentiveness, and abnormal involuntary movements. Differentials include ictus, vs PNES, vs other.     Hospital records reviewed from [facility] with one EEG on 4/9 noting \"intermittent sharp waves coming from both hemispheres consistent with interictal epileptiform discharges, but there were no recorded seizures. \"  EEG on 4/10 normal.  For this reason obtain repeat EEG due to discrepancy in findings and no mention of where sharp waves are appearing in the brain. Obtain MRI of the brain CT of the brain reviewed from 4/9 and findings are unrevealing. Patient with a history of cancer to the throat and it is necessary to rule out metastasis or structural changes that may correlate with her symptoms. Valium for claustrophobia and directions to use open MRI scanner. Follow up after tests. Patient agreeable with plan of care. All questions addressed. Parul Shelley was seen today for establish care. Diagnoses and all orders for this visit:    Spells of decreased attentiveness    Seizures ([address]  -     MRI BRAIN W WO CONT; Future  -     EEG; Future    Abnormal involuntary movement    Other orders  -     diazePAM (VALIUM) 10 mg tablet; Take one PO 45 minutes prior to MRI-needs         Signed By: Jonathon Barrientos NP    This note will not be viewable in [address] I have reviewed the documentation provided by the nurse practitioner, Ms. Fabi Lawson, and we have discussed her findings and the clinical impression. I have formulated with her the proposed management plans for this patient. Additionally,  I have personally evaluated the patient to verify the history and to confirm physical findings. Below are my additional comments:  44-year-old lady with multiple spells as documented above with an EEG which when I review in the chart medical record gives me the report but I cannot see the tracing. She is said to have bihemispheric epileptiform discharges but there is no mention of location. There is no really indication as to whether these are bi-PLEDs etc.  She was started on Keppra and has not had any further events but again were just a few days out. She is on examination today pleasant and interactive.   She is very thin.  She has stigmata of her cancer treatment. She has no nystagmus. No pronation or drift. She resists fully in the upper and lower extremities bilaterally. Symmetrical reflexes and she has no ataxia. Differential diagnosis of course includes ictus and with her history of cancer one must always be prudent to evaluate particularly in a lady of her age with new onset seizure for potential for metastatic disease being a nidus for her ictus. We also need to consider other potential causes including psychogenic nonepileptic spells versus vascular issue versus autonomic issue versus cardiac versus other. At this juncture we are going to proceed with a workup as outlined above including an MRI of the brain and she is a bit claustrophobic she will give her some Valium and have that scheduled at the open scanner. We will get another EEG to evaluate for epileptiform abnormalities. We will continue the [address] for now. Further treatment plans as dictated above and as dictated by her clinical course. Jem Kee MD      This note was created using voice recognition software. Despite editing, there may be syntax errors.

## 2017-06-01 ENCOUNTER — OFFICE VISIT (OUTPATIENT)
Dept: VASCULAR SURGERY | Age: 62
End: 2017-06-01

## 2017-06-01 VITALS
RESPIRATION RATE: 14 BRPM | HEART RATE: 91 BPM | WEIGHT: 85 LBS | SYSTOLIC BLOOD PRESSURE: 118 MMHG | HEIGHT: 62 IN | BODY MASS INDEX: 15.64 KG/M2 | DIASTOLIC BLOOD PRESSURE: 82 MMHG

## 2017-06-01 DIAGNOSIS — I77.9 BILATERAL CAROTID ARTERY DISEASE (HCC): Primary | ICD-10-CM

## 2017-06-02 NOTE — PROGRESS NOTES
Ms. Wilfred Gregory is here today a little bit overdue for what was supposed to have been a six-month follow-up earlier this year. She had unfortunately fallen and broken her hip, and was hospitalized and then had some extended recovery at a skilled nursing facility. She is now home and trying to catch up on her follow-ups. She has a remote history of celiac artery stenting with us. We have observed recurrent stenosis of the celiac, but the SMA is patent. However she has not had any type of persistent postprandial or abdominal pain symptoms that have led us to discuss further intervention. Since we had also done that stenting, she had head and neck cancer with radiation. 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 But she does say that with her PEG feedings, she has no pain or issues that she feels is necessary to do further testing or intervention for this iliac blockage at this time. Also because of her head and neck radiation we have done close follow-up on her carotid arteries. She has had duplex studies that have suggested more severe stenosis. But we have done carotid angiograms, and had not been able to correlate any severe degree of stenosis to warrant intervention. We did recommend continued surveillance. She did have that study earlier this year, which has been stable from previous that we had correlated to the angiogram.  But she is now in the window to be due for her regular ultrasound again, we will at least go ahead and schedule another carotid angiogram and have her follow-up again afterwards.

## 2017-06-05 DIAGNOSIS — Z93.1 S/P PERCUTANEOUS ENDOSCOPIC GASTROSTOMY (PEG) TUBE PLACEMENT (HCC): ICD-10-CM

## 2017-06-05 RX ORDER — CYCLOBENZAPRINE HCL 5 MG
5 TABLET ORAL
Qty: 25 TAB | Refills: 0 | Status: SHIPPED | OUTPATIENT
Start: 2017-06-05 | End: 2017-06-30 | Stop reason: SDUPTHER

## 2017-06-06 ENCOUNTER — HOSPITAL ENCOUNTER (OUTPATIENT)
Dept: NEUROLOGY | Age: 62
Discharge: HOME | End: 2017-06-06
Attending: NURSE PRACTITIONER
Payer: MEDICARE

## 2017-06-06 DIAGNOSIS — R56.9 SEIZURES (HCC): ICD-10-CM

## 2017-06-06 PROCEDURE — 95822 EEG COMA OR SLEEP ONLY: CPT

## 2017-06-06 PROCEDURE — 95816 EEG AWAKE AND DROWSY: CPT

## 2017-06-06 NOTE — PROGRESS NOTES
Sleep portion of EEG was completed per physician.   Pt was instructed by scheduling to sleep deprive herself and she did so sleep portion of test was completed first.

## 2017-06-07 NOTE — PROCEDURES
[place]    Name:  Ena Segura  MR#:  [id number]  :  1955  Account #:  [de-identified]  Date of Adm:  2017  Date of Service:  2017      REFERRING PHYSICIAN:  Aparna Hatch NP    INTERPRETING PHYSICIAN: Prachi Godinez. Ella Nolan MD.-    INDICATIONS: This is a 57-year-old right-handed female who presents  for evaluation of possible seizures. CURRENT MEDICATIONS: Include:  1. Valium. 2. Keppra. 3. Synthroid. 4. Morphine. 5. Proventil. 6. Prilosec. 7. Xylocaine. 8. Neurontin. EEG examination was performed as an outpatient, utilizing both  referential and differential montages, as well as International 10/20  electrode placement system. The patient was noted to be initially  asleep. Background activity is low voltage mixed frequency. At times  the recording was dominated by low voltage fast activity usually seen  in the anterior central regions, but sometimes seen diffusely as well as  spindle activity up to 14 Hz. The patient had taken Valium prior to  study. She was sleep deprived prior to study as well. Hyperventilation,  mental alerting and photic stimulation were attempted. There were no  focal lateralized or abnormal paroxysmal discharges otherwise  seen. On single channel EKG monitoring, no cardiac ectopy was  appreciated. ELECTROENCEPHALOGRAM INTERPRETATION: Essentially  normal awake but primarily asleep recording with the presence of theta  activity noted primarily in the anterior frontal regions consistent with a  drug effect, such as from benzodiazepines.         Rosita Boast, MD NM / WILL  D:  2017   08:40  T:  2017   09:01  Job #:  [id number]

## 2017-06-12 ENCOUNTER — OFFICE VISIT (OUTPATIENT)
Dept: VASCULAR SURGERY | Age: 62
End: 2017-06-12

## 2017-06-12 DIAGNOSIS — I77.9 BILATERAL CAROTID ARTERY DISEASE (HCC): ICD-10-CM

## 2017-06-12 DIAGNOSIS — I65.23 INTERNAL CAROTID ARTERY STENOSIS, BILATERAL: ICD-10-CM

## 2017-06-12 NOTE — PROCEDURES
[facility]   *** FINAL REPORT ***    Name: Azam Mantilla  MRN: [id number]       Outpatient  : 02 Dec 1955  HIS Order #: [id number]  [address] [facility] Visit #: [id number]  Date: 2017    TYPE OF TEST: Cerebrovascular Duplex    REASON FOR TEST  Carotid disease    Right Carotid:-             Proximal               Mid                 Distal  cm/s  Systolic  Diastolic  Systolic  Diastolic  Systolic  Diastolic  CCA:     43.2      11.0      104.0      25.0       93.0      25.0  Bulb:    89.0      24.0  ECA:    183.0      31.0  ICA:    129.0      36.0      250.0      71.0      154.0      38.0  ICA/CCA:  3.2       6.5    ICA Stenosis: 50-69%    Right Vertebral:-  Finding: Occluded  Sys:        0.0  Hailey:        0.0    Right Subclavian:    Left Carotid:-            Proximal                Mid                 Distal  cm/s  Systolic  Diastolic  Systolic  Diastolic  Systolic  Diastolic  CCA:     83.0      17.0                            69.0      24.0  Bulb:  ECA:    112.0       0.0  ICA:     69.0      24.0      147.0      54.0      246.0      85.0  ICA/CCA:  3.4       5.0    ICA Stenosis: 50-69%    Left Vertebral:-  Finding: Antegrade  Sys:       63.0  Hailey:       26.0    Left Subclavian:    INTERPRETATION/FINDINGS  Duplex images were obtained using 2-D gray scale, color flow and  spectral doppler analysis. 1. Bilateral 50-69% stenosis of the internal carotid arteries. 2. No significant stenosis in the external carotid arteries  bilaterally. 3. 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 4. Antegrade flow in the left vertebral artery. Plaque Morphology:  1. Hypoechoic plaque in the bulb and right ICA. 2. Heterogeneous plaque in the bulb and left ICA. Accelerated heart rate on today's exam, patients states this condition   is known. Unable to reproduce PSV/EDV in the Severe range  bilaterally. ADDITIONAL COMMENTS    I have personally reviewed the data relevant to the interpretation of  this  study.     TECHNOLOGIST: Mary Webster RDMS  Signed: 06/12/2017 11:49 AM    PHYSICIAN: Rimma Singh.  Kurt Patterson MD  Signed: 06/12/2017 02:28 PM

## 2017-06-14 ENCOUNTER — TELEPHONE (OUTPATIENT)
Dept: NEUROLOGY | Age: 62
End: 2017-06-14

## 2017-06-14 DIAGNOSIS — Z93.1 S/P PERCUTANEOUS ENDOSCOPIC GASTROSTOMY (PEG) TUBE PLACEMENT (HCC): ICD-10-CM

## 2017-06-14 RX ORDER — MORPHINE SULFATE ORAL SOLUTION 10 MG/5ML
SOLUTION ORAL
Qty: 100 ML | Refills: 0 | Status: SHIPPED | OUTPATIENT
Start: 2017-06-14 | End: 2017-08-09 | Stop reason: SDUPTHER

## 2017-06-14 NOTE — TELEPHONE ENCOUNTER
MD Diego Cruz LPN        Caller: Unspecified (Today, 10:16 AM)                     She is grown   Her decision

## 2017-06-14 NOTE — TELEPHONE ENCOUNTER
Please ask patient to sign controlled substance agreement. Reviewed report generated by the [facility]. Does not demonstrate aberrancies or inconsistencies with regard to the prescribing of controlled medications to this patient by other providers. Last filled 3/29/2017 per .

## 2017-06-16 NOTE — TELEPHONE ENCOUNTER
Chart routed to provider for review. Patient is refusing to have MRI done due to it causing her to have anxiety. (3) slightly limited

## 2017-06-19 ENCOUNTER — OFFICE VISIT (OUTPATIENT)
Dept: NEUROLOGY | Age: 62
End: 2017-06-19

## 2017-06-19 VITALS
HEART RATE: 117 BPM | OXYGEN SATURATION: 99 % | RESPIRATION RATE: 18 BRPM | WEIGHT: 83.2 LBS | BODY MASS INDEX: 15.31 KG/M2 | TEMPERATURE: 97.7 F | HEIGHT: 62 IN

## 2017-06-19 DIAGNOSIS — R56.9 SEIZURES (HCC): Primary | ICD-10-CM

## 2017-06-19 NOTE — PROGRESS NOTES
[employer]  [address], [address], [place], [address]  [facility] [address] [place], [address]  Office:  [phone]  Fax: [phone]  Chief Complaint   Patient presents with    Neurologic Problem     F/U Seizures, EEG results       This is a 63year old female presenting for follow up. Last month with suspected seizures. She is here today taking Keppra 1000 mg BID via her PEG tube. She denies seizure like symptoms since her initial episodes. Denies waking up on the floor, biting her tongue, or losing her urine in the middle of the night. She is tolerating Keppra and taking this as indicated. Has adequate refills. She completed her EEG and took the valium that was indicated prior to her MRI, patient says she was unsure when to dose this. She did not get her MRI because she was too scared. She says she had one done before and is not willing to have another one. She is aware that with her history of cancer and new onset seizures why we want her to have this MRI. Patient confirms understanding but does not wish to go through with this testing. She states \"if I have another seizure I will get it done. \" Her  is in the room and also verbalizes understanding and why the MRI is needed. Denies falls, LOC, or weakness. Past Medical History:   Diagnosis Date    Age-related osteoporosis with current pathological fracture 5/21/2017    Cigarette smoker     Closed avulsion fracture of greater trochanter of femur ([address] 4/7/2017    COPD (chronic obstructive pulmonary disease) ([address]     Esophageal stricture 2014    s/p XRT for oropharyngeal cancer; requiring GJ tube for nutrition. 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    s/p stent    Vitamin D deficiency        Past Surgical History:   Procedure Laterality Date    ABDOMEN SURGERY PROC UNLISTED      \"ulcer\" surgery    HX ENDOSCOPY  5/9/2014    w/ dilation    HX ENDOSCOPY  5/13/2014    w/ dilation    HX ENDOSCOPY  5/21    with Dilatation    HX ENDOSCOPY  6/4/2014    w/ dilation    HX GI      HX ORTHOPAEDIC      intramedullary chao fixation of the right tiba    HX OTHER SURGICAL      skin cancer removal from right shoulder; PEG TUBE    HX TRACHEOSTOMY         Current Outpatient Prescriptions   Medication Sig Dispense Refill    morphine 10 mg/5 mL oral solution 2.5ml or 5ml by peg tube every 8 hours as needed for pain 100 mL 0    cyclobenzaprine (FLEXERIL) 5 mg tablet Take 1 Tab by mouth three (3) times daily as needed for Muscle Spasm(s). 25 Tab 0    amino acids-protein hydrolys (PROSOURCE NO CARB) 15-60 gram-kcal/30 mL lipk Take 30 mL by mouth daily.  levETIRAcetam (KEPPRA) 100 mg/ml soln oral solution 10 mL by Per G Tube route two (2) times a day. 600 mL 5    levothyroxine (SYNTHROID) 75 mcg tablet 0.5 Tabs by Per G Tube route Daily (before breakfast). Indications: hypothyroidism 15 Tab 0    lactobacillus rhamnosus gg 10 billion cell (CULTURELLE) 10 billion cell capsule 1 Cap by Per G Tube route Before breakfast and dinner. 60 Cap 0    melatonin 3 mg tablet 1 Tab by Per G Tube route nightly as needed. 30 Tab 0    albuterol (PROVENTIL VENTOLIN) 2.5 mg /3 mL (0.083 %) nebulizer solution 2.5 mg by Nebulization route as needed for Wheezing.  cholecalciferol, vitamin D3, (VITAMIN D3) 2,000 unit tab 1 Tab by PEG Tube route daily.  omeprazole (PRILOSEC) 2.5 mg suDR delayed release suspension 20 mg by Per G Tube route two (2) times a day.  lidocaine HCl (XYLOCAINE) 3 % topical cream Apply  to affected area two (2) times a day.  Indications: WITH DESITIN      aspirin 81 mg chewable tablet 81 mg by PEG Tube route daily.  gabapentin (NEURONTIN) 300 mg capsule 300 mg by PEG Tube route three (3) times daily.  cyanocobalamin (VITAMIN B12) 500 mcg tablet 500 mcg by PEG Tube route daily. Via peg       multivitamin (THERAGRAN) liquid 10 mL by PEG Tube route daily.  potassium chloride (K-DUR, KLOR-CON) 10 mEq tablet 10 mEq by Feeding Tube route daily.  OXYGEN-AIR DELIVERY SYSTEMS 2 L/min by Nasal route as needed. Allergies   Allergen Reactions    Percocet [Oxycodone-Acetaminophen] Itching and Hives    Percodan [Oxycodone-Aspirin] Rash and Itching    Plavix [Clopidogrel] Rash and Hives       Social History   Substance Use Topics    Smoking status: Current Every Day Smoker     Packs/day: 0.50     Types: Cigarettes    Smokeless tobacco: Never Used      Comment: DENIES BUT HEAVY ODOR NOTED ON PT. THEN SPOUSE STATES OCCASSIONAL    Alcohol use 8.4 oz/week     14 Cans of beer, 0 Standard drinks or equivalent per week       Family History   Problem Relation Age of Onset    Heart Disease Mother        Review of Systems  Pertinent positives and negatives as noted otherwise comprehensive review is negative. Examination  Visit Vitals    Pulse (!) 117    Temp 97.7 °F (36.5 °C) (Temporal)    Resp 18    Ht 5' 2\" (1.575 m)    Wt 37.7 kg (83 lb 3.2 oz)    SpO2 99%    BMI 15.22 kg/m2   Patient said \"BP was 130/80\" in office    This is a pleasant 63year old female, thin, well appearing in no acute distress. No icterus. Oropharynx clear. Supple neck without bruit. Heart regular. No murmur. No edema. Neurologically she is alert and oriented to time and place. She is conversant and able to talk about her medical history and current events. DTRs symmetrical in the upper and lowers. Finger nose finger is normal. No abnormal movements. Ambulates with assistive device.      Impression/Plan  Mat Whelan is a 63 y.o. female whose history and physical are consistent with new onset seizures. Patient denies seizure activity since initial onset. Taking Keppra 1000 mg BID via PEG tube. Adequate refills. Maintain this dose due to no new seizure activity. Tolerating this well no signs of toxicity on exam.  Patient had an EEG done and showed slowing as she had taken the valium before this test that was indicated for her MRI. We will order a sleep deprived EEG in hopes of capturing abnormal waves and their origin. Patient was counseled regarding why we want to obtain an MRI. We discussed that new onset seizure is an indication for MRI of the brain but her history of cancer further necessitates advanced imaging. Patient and her  verbalize understanding. She does not wish to have an MRI at this time even with discussion of using an open MRI and supplementing with valium. She says if she has another seizure she will have the MRI completed. Discussed safety. Discussed plan of care with Dr. Lanie Michel. Follow up after tests. Call the office with seizure or with any concerns to be seen sooner. Norma was seen today for neurologic problem. Diagnoses and all orders for this visit:    Seizures ([address]  -     EEG SLEEP DEPRIVED; Future    Total time: 30 min   Counseling / coordination time: 20 min   > 50% counseling / coordination?: Yes re: results, plan of care, diagnostic testing, MRI, medication, safety. Lisette Moore NP  This note will not be viewable in [address]

## 2017-06-19 NOTE — MR AVS SNAPSHOT
Visit Information Date & Time Provider Department Dept. Phone Encounter #  
 6/19/2017 10:30 AM Nick Mcginnis NP [facility] [phone] [id number] Follow-up Instructions Return for after tests. Your Appointments 6/27/2017 11:30 AM  
Office Visit with MD Lakshmi Johnson [facility]) Appt Note: followup with dr Madeline aLrios only per cassidy after carotids [address] [facility] [address] [place] [address] [facility]  
[phone] [address] [facility] [address] [place] [address] [facility]  
  
    
 8/29/2017  9:35 AM  
LAB with [facility] NURSE VISIT Internist of [facility] ([facility]) Appt Note: lab  
 [address], [address] [address] [address] [place]  
  
   
 [address] [facility]  
  
    
 9/5/2017 10:00 AM  
Office Visit with Gaston Robins MD  
Internist of [address] [place] [address]) Appt Note: ov bs  
 [address] [facility] [address], [address] [address] [address] [place]  
  
   
 [address], [facility] Upcoming Health Maintenance Date Due INFLUENZA AGE 9 TO ADULT 8/1/2017 Pneumococcal 19-64 Highest Risk (3 of 3 - PCV13) 10/13/2017 MEDICARE YEARLY EXAM 3/3/2018 BREAST CANCER SCRN MAMMOGRAM 10/13/2018 PAP AKA CERVICAL CYTOLOGY 11/17/2018 COLONOSCOPY 1/10/2023 DTaP/Tdap/Td series (2 - Td) 3/6/2027 Allergies as of 6/19/2017  Review Complete On: 6/19/2017 By: Alf Cheung LPN Severity Noted Reaction Type Reactions Percocet [Oxycodone-acetaminophen]    Itching, Hives Percodan [Oxycodone-aspirin]  05/21/2014    Rash, Itching Plavix [Clopidogrel]  04/10/2013    Rash, Hives Current Immunizations  Reviewed on 10/13/2016 Name Date  Influenza Vaccine (Quad) PF 10/13/2016  3:26 PM, 11/17/2015  3:55 PM  
 Influenza Vaccine PF 11/4/2014, 10/21/2013  1:03 PM  
 Pneumococcal Polysaccharide (PPSV-23) 10/13/2016  3:52 PM  
 Tdap 3/6/2017 Not reviewed this visit You Were Diagnosed With   
  
 Codes Comments Seizures ([facility] [address]    -  Primary ICD-10-CM: R56.9 ICD-9-CM: 780.39 Vitals Pulse Temp Resp Height(growth percentile) Weight(growth percentile) SpO2  
 (!) 117 97.7 °F (36.5 °C) (Temporal) 18 5' 2\" (1.575 m) 83 lb 3.2 oz (37.7 kg) 99% BMI OB Status Smoking Status 15.22 kg/m2 Postmenopausal Current Every Day Smoker BMI and BSA Data Body Mass Index Body Surface Area  
 15.22 kg/m 2 1.28 m 2 Preferred Pharmacy Pharmacy Name Phone [address] [place] [address], [address] [place] [address] [phone] Your Updated Medication List  
  
   
This list is accurate as of: 6/19/17 11:05 AM.  Always use your most recent med list.  
  
  
  
  
 albuterol 2.5 mg /3 mL (0.083 %) nebulizer solution Commonly known as:  PROVENTIL VENTOLIN  
2.5 mg by Nebulization route as needed for Wheezing. aspirin 81 mg chewable tablet 81 mg by PEG Tube route daily. cyanocobalamin 500 mcg tablet Commonly known as:  VITAMIN B12  
500 mcg by PEG Tube route daily. Via peg  
  
 cyclobenzaprine 5 mg tablet Commonly known as:  FLEXERIL Take 1 Tab by mouth three (3) times daily as needed for Muscle Spasm(s). gabapentin 300 mg capsule Commonly known as:  NEURONTIN  
300 mg by PEG Tube route three (3) times daily. lactobacillus rhamnosus gg 10 billion cell 10 billion cell capsule Commonly known as:  CULTURELLE  
1 Cap by Per G Tube route Before breakfast and dinner. levETIRAcetam 100 mg/ml Soln oral solution Commonly known as:  KEPPRA 10 mL by Per G Tube route two (2) times a day. levothyroxine 75 mcg tablet Commonly known as:  synthroid  
0.5 Tabs by Per G Tube route Daily (before breakfast).  Indications: hypothyroidism  
  
 lidocaine HCl 3 % topical cream  
 Commonly known as:  XYLOCAINE Apply  to affected area two (2) times a day. Indications: WITH DESITIN  
  
 melatonin 3 mg tablet 1 Tab by Per G Tube route nightly as needed. morphine 10 mg/5 mL oral solution 2.5ml or 5ml by peg tube every 8 hours as needed for pain  
  
 multivitamin liquid Commonly known as:  THERAGRAN  
10 mL by PEG Tube route daily. omeprazole 2.5 mg Sudr delayed release suspension Commonly known as:  PRILOSEC  
20 mg by Per G Tube route two (2) times a day. OXYGEN-AIR DELIVERY SYSTEMS  
2 L/min by Nasal route as needed. potassium chloride 10 mEq tablet Commonly known as:  KLOR-CON 10 mEq by Feeding Tube route daily. PROSOURCE NO CARB 15-60 gram-kcal/30 mL Lipk Generic drug:  amino acids-protein hydrolys Take 30 mL by mouth daily. VITAMIN D3 2,000 unit Tab Generic drug:  cholecalciferol (vitamin D3) 1 Tab by PEG Tube route daily. Follow-up Instructions Return for after tests. To-Do List   
 06/19/2017 Neurology:  EEG SLEEP DEPRIVED Please provide this summary of care documentation to your next provider. Your primary care clinician is listed as Jolie Thompson. If you have any questions after today's visit, please call [phone].

## 2017-06-27 ENCOUNTER — OFFICE VISIT (OUTPATIENT)
Dept: VASCULAR SURGERY | Age: 62
End: 2017-06-27

## 2017-06-27 VITALS
BODY MASS INDEX: 15.27 KG/M2 | HEART RATE: 90 BPM | SYSTOLIC BLOOD PRESSURE: 122 MMHG | HEIGHT: 62 IN | DIASTOLIC BLOOD PRESSURE: 78 MMHG | RESPIRATION RATE: 18 BRPM | WEIGHT: 83 LBS

## 2017-06-27 DIAGNOSIS — I77.9 BILATERAL CAROTID ARTERY DISEASE (HCC): Primary | ICD-10-CM

## 2017-06-27 DIAGNOSIS — I77.1 CELIAC ARTERY STENOSIS (HCC): ICD-10-CM

## 2017-06-27 NOTE — MR AVS SNAPSHOT
Visit Information Date & Time Provider Department Dept. Phone Encounter #  
 6/27/2017 11:30 AM MD [facility] [phone] [id number] Follow-up Instructions Return in about 6 months (around 12/27/2017). Follow-up and Disposition History Your Appointments 8/29/2017  9:35 AM  
LAB with [facility] NURSE VISIT Internist of [facility] ([address]) Appt Note: lab  
 [address], [address] [address] [address] [place]  
  
   
 [address], [address]  
  
    
 9/5/2017 10:00 AM  
Office Visit with Jan Garcia MD  
Internist of [facility] [address] [place] [address]) Appt Note: ov bs  
 [address] [facility], [address] [address] [address] [place]  
  
   
 [address] [facility], [address]  
  
    
 1/4/2018  9:00 AM  
PROCEDURE with [facility] 1 [facility] ([address]) Appt Note: renal 6mo zoran 2300 [facility] [address] [facility] [address]  
[phone] 2630 [facility] 1M07  
  
    
 1/4/2018 10:00 AM  
PROCEDURE with [facility] 1 [facility] ([address]) Appt Note: cv 6mo zoran 2300 [facility] [address] [facility] [address]  
[phone] 1/16/2018 10:45 AM  
Office Visit with MD [facility] [address] [place] [address]) Appt Note: 6mo ck with test on 01/04/17 2300 [facility] [address] 200 [facility] [address]  
[phone] 2300 [facility], [facility] [address] [facility] [address] Upcoming Health Maintenance Date Due INFLUENZA AGE 9 TO ADULT 8/1/2017 Pneumococcal 19-64 Highest Risk (3 of 3 - PCV13) 10/13/2017 MEDICARE YEARLY EXAM 3/3/2018 BREAST CANCER SCRN MAMMOGRAM 10/13/2018 PAP AKA CERVICAL CYTOLOGY 11/17/2018 COLONOSCOPY 1/10/2023 DTaP/Tdap/Td series (2 - Td) 3/6/2027 Allergies as of 6/27/2017  Review Complete On: 6/27/2017 By: Sachin Olivas MD  
  
 Severity Noted Reaction Type Reactions Percocet [Oxycodone-acetaminophen]    Itching, Hives Percodan [Oxycodone-aspirin]  05/21/2014    Rash, Itching Plavix [Clopidogrel]  04/10/2013    Rash, Hives Current Immunizations  Reviewed on 10/13/2016 Name Date Influenza Vaccine (Quad) PF 10/13/2016  3:26 PM, 11/17/2015  3:55 PM  
 Influenza Vaccine PF 11/4/2014, 10/21/2013  1:03 PM  
 Pneumococcal Polysaccharide (PPSV-23) 10/13/2016  3:52 PM  
 Tdap 3/6/2017 Not reviewed this visit You Were Diagnosed With   
  
 Codes Comments Bilateral carotid artery disease ([facility]    -  Primary ICD-10-CM: I77.9 ICD-9-CM: 447.9 Celiac artery stenosis (HCC)     ICD-10-CM: I77.4 ICD-9-CM: 810. 4 Vitals BP Pulse Resp Height(growth percentile) Weight(growth percentile) BMI  
 122/78 (BP 1 Location: Right arm, BP Patient Position: Sitting) 90 18 5' 2\" (1.575 m) 83 lb (37.6 kg) 15.18 kg/m2 OB Status Smoking Status Postmenopausal Current Every Day Smoker Vitals History BMI and BSA Data Body Mass Index Body Surface Area  
 15.18 kg/m 2 1.28 m 2 Preferred Pharmacy Pharmacy Name Phone [address], [address] [place] [address] [phone] Your Updated Medication List  
  
   
This list is accurate as of: 6/27/17 12:48 PM.  Always use your most recent med list.  
  
  
  
  
 albuterol 2.5 mg /3 mL (0.083 %) nebulizer solution Commonly known as:  PROVENTIL VENTOLIN  
2.5 mg by Nebulization route as needed for Wheezing. aspirin 81 mg chewable tablet 81 mg by PEG Tube route daily. cyanocobalamin 500 mcg tablet Commonly known as:  VITAMIN B12  
500 mcg by PEG Tube route daily. Via peg  
  
 cyclobenzaprine 5 mg tablet Commonly known as:  FLEXERIL  
 Take 1 Tab by mouth three (3) times daily as needed for Muscle Spasm(s). gabapentin 300 mg capsule Commonly known as:  NEURONTIN  
300 mg by PEG Tube route three (3) times daily. lactobacillus rhamnosus gg 10 billion cell 10 billion cell capsule Commonly known as:  CULTURELLE  
1 Cap by Per G Tube route Before breakfast and dinner. levETIRAcetam 100 mg/ml Soln oral solution Commonly known as:  KEPPRA 10 mL by Per G Tube route two (2) times a day. levothyroxine 75 mcg tablet Commonly known as:  synthroid  
0.5 Tabs by Per G Tube route Daily (before breakfast). Indications: hypothyroidism  
  
 lidocaine HCl 3 % topical cream  
Commonly known as:  XYLOCAINE Apply  to affected area two (2) times a day. Indications: WITH DESITIN  
  
 melatonin 3 mg tablet 1 Tab by Per G Tube route nightly as needed. morphine 10 mg/5 mL oral solution 2.5ml or 5ml by peg tube every 8 hours as needed for pain  
  
 multivitamin liquid Commonly known as:  THERAGRAN  
10 mL by PEG Tube route daily. omeprazole 2.5 mg Sudr delayed release suspension Commonly known as:  PRILOSEC  
20 mg by Per G Tube route two (2) times a day. OXYGEN-AIR DELIVERY SYSTEMS  
2 L/min by Nasal route as needed. potassium chloride 10 mEq tablet Commonly known as:  KLOR-CON 10 mEq by Feeding Tube route daily. PROSOURCE NO CARB 15-60 gram-kcal/30 mL Lipk Generic drug:  amino acids-protein hydrolys Take 30 mL by mouth daily. VITAMIN D3 2,000 unit Tab Generic drug:  cholecalciferol (vitamin D3) 1 Tab by PEG Tube route daily. Follow-up Instructions Return in about 6 months (around 12/27/2017). To-Do List   
 07/06/2017 9:30 AM  
  Appointment with [facility] EEG RM 1 at [facility] 6348 ([phone]) All patients (\"Awake only\" and \"Awake & Asleep\"): 1) Hair must be clean, free of oils, gels, spray, mousse. 2) Do not consume any caffeine products (coffee, tea, soda, chocolate) for 24 hours prior to test. 3) Have someone available to drive the patient home if patient is sedated. 4) May take medications or eat meals prior to study as normal.  If doing AWAKE ONLY --- There are no sleep instructions for this test and meals are allowed. If doing AWAKE & ASLEEP:  patient must stay up until 2:00 am and then wake up at 6:00 am on the day of study (sleep 4 hours only), without the aid of Caffeine. Additional instructions for \"Awake & Asleep\" only study: ADULT patients should ONLY get four (4) hours of sleep the night prior to study. (Preferred: Stay awake until 2:00 am and sleep until 6:00 am). CHILDREN should ONLY get five (5) hours of sleep the night prior to study. (Preferred: Stay awake until 12:00 midnight and sleep until 5:00 am) Appointments scheduled before 3:00pm:  Please arrive in the [address] [place] [address] and check in with the registrar 15 minutes prior to your scheduled appointment time. This is the same entrance as the [facility], facing [address]. [facility] Center Parking: turn off [address] onto [address]. Proceed one block and make a right onto [address] [place] will be on your left). Go to the end of [address] and parking is located on your left. Appointments scheduled at 3:00pm or later:  Registration in the [address] [place] [address] CLOSES at 3:00 p.m. Patients should report to Patient Access/Admitting located on the left after entering the MAIN entrance of [facility]. Patient should plan to arrive 20 minutes prior to their appointment time if going to Patient Access/Admitting. After test, patient may exit from the [address] [place] [address] or [facility] Entrance. 12/27/2017 Imaging:  DUPLEX ABD VISC ART ORGANS COMPLETE AMB   
  
 12/27/2017 Imaging:  DUPLEX CAROTID BILATERAL AMB Please provide this summary of care documentation to your next provider. Your primary care clinician is listed as Hill Recinos. If you have any questions after today's visit, please call [phone].

## 2017-06-27 NOTE — PROGRESS NOTES
Chapo Purcell    Chief Complaint   Patient presents with    Carotid Artery Stenosis       History and Physical    73-year-old female with history of neck radiation here for follow-up regarding carotid Doppler today. She has had no TIAs or strokes. She does continue to smoke. She has a feeding tube is unable to swallow food well. She gets satiety and no pain with her feeding. Past Medical History:   Diagnosis Date    Age-related osteoporosis with current pathological fracture 5/21/2017    Cigarette smoker     Closed avulsion fracture of greater trochanter of femur ([address] 4/7/2017    COPD (chronic obstructive pulmonary disease) ([address]     Esophageal stricture 2014    s/p XRT for oropharyngeal cancer; requiring GJ tube for nutrition. 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    s/p stent    Vitamin D deficiency      Patient Active Problem List   Diagnosis Code    Melanoma right scapula C43.9    COPD (chronic obstructive pulmonary disease) ([address] J44.9    Carotid artery disease ([address] bilateral moderate I77.9    Celiac artery stenosis, status post stent I77.4    Severe protein-calorie malnutrition ([address] E43    Squamous cell carcinoma of oropharynx, with PEG tube and tracheostomy 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    Anemia D64.9    Esophageal stricture K22.2    Vitamin D deficiency E55.9    Acquired hypothyroidism E03.9    Hyponatremia E87.1    Carpal tunnel syndrome, left G56.02    GI bleed K92.2    Dysphagia, 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    S/P percutaneous endoscopic gastrostomy (PEG) tube placement (HCC) Z93.1    History of radiation to head and neck region Z92.3    Current smoker F17.200    Essential hypertension I10    Multiple pulmonary nodules R91.8    Closed avulsion fracture of greater trochanter of femur ([address] S72.113A    Seizures (HCC) R56.9    Age-related osteoporosis with current pathological fracture M80.00XA     Past Surgical History:   Procedure Laterality Date    ABDOMEN SURGERY PROC UNLISTED      \"ulcer\" surgery    HX ENDOSCOPY  5/9/2014    w/ dilation    HX ENDOSCOPY  5/13/2014    w/ dilation    HX ENDOSCOPY  5/21    with Dilatation    HX ENDOSCOPY  6/4/2014    w/ dilation    HX GI      HX ORTHOPAEDIC      intramedullary chao fixation of the right tiba    HX OTHER SURGICAL      skin cancer removal from right shoulder; PEG TUBE    HX TRACHEOSTOMY       Current Outpatient Prescriptions   Medication Sig Dispense Refill    morphine 10 mg/5 mL oral solution 2.5ml or 5ml by peg tube every 8 hours as needed for pain 100 mL 0    cyclobenzaprine (FLEXERIL) 5 mg tablet Take 1 Tab by mouth three (3) times daily as needed for Muscle Spasm(s). 25 Tab 0    amino acids-protein hydrolys (PROSOURCE NO CARB) 15-60 gram-kcal/30 mL lipk Take 30 mL by mouth daily.  levETIRAcetam (KEPPRA) 100 mg/ml soln oral solution 10 mL by Per G Tube route two (2) times a day. 600 mL 5    levothyroxine (SYNTHROID) 75 mcg tablet 0.5 Tabs by Per G Tube route Daily (before breakfast). Indications: hypothyroidism 15 Tab 0    lactobacillus rhamnosus gg 10 billion cell (CULTURELLE) 10 billion cell capsule 1 Cap by Per G Tube route Before breakfast and dinner. 60 Cap 0    melatonin 3 mg tablet 1 Tab by Per G Tube route nightly as needed. 30 Tab 0    albuterol (PROVENTIL VENTOLIN) 2.5 mg /3 mL (0.083 %) nebulizer solution 2.5 mg by Nebulization route as needed for Wheezing.  cholecalciferol, vitamin D3, (VITAMIN D3) 2,000 unit tab 1 Tab by PEG Tube route daily.  omeprazole (PRILOSEC) 2.5 mg suDR delayed release suspension 20 mg by Per G Tube route two (2) times a day.       lidocaine HCl (XYLOCAINE) 3 % topical cream Apply  to affected area two (2) times a day. Indications: WITH DESITIN      aspirin 81 mg chewable tablet 81 mg by PEG Tube route daily.  gabapentin (NEURONTIN) 300 mg capsule 300 mg by PEG Tube route three (3) times daily.  cyanocobalamin (VITAMIN B12) 500 mcg tablet 500 mcg by PEG Tube route daily. Via peg       multivitamin (THERAGRAN) liquid 10 mL by PEG Tube route daily.  potassium chloride (K-DUR, KLOR-CON) 10 mEq tablet 10 mEq by Feeding Tube route daily.  OXYGEN-AIR DELIVERY SYSTEMS 2 L/min by Nasal route as needed. Allergies   Allergen Reactions    Percocet [Oxycodone-Acetaminophen] Itching and Hives    Percodan [Oxycodone-Aspirin] Rash and Itching    Plavix [Clopidogrel] Rash and Hives       Review of Systems    A full review of systems was completed times ten organ systems and was deemed negative unless otherwise mentioned in the HPI. Physical   Visit Vitals    /78 (BP 1 Location: Right arm, BP Patient Position: Sitting)    Pulse 90    Resp 18    Ht 5' 2\" (1.575 m)    Wt 83 lb (37.6 kg)    BMI 15.18 kg/m2       Thin statured 63 here with her  today in good spirits  Head is normocephalic no facial symmetry notable pupils are reactive   Neck status post adjuvant therapy  Chest coarse breath sounds bilateral  Cardiac regular  Abdomen soft nontender feeding tube notable  Extremities without edema  Range of motion strengths  No signs of acute arterial insufficiency peripherally  Doppler shows bilateral moderate range stenosis, no critical stenosis identified    Impression/Plan:     ICD-10-CM ICD-9-CM    1. Bilateral carotid artery disease (HCC) I77.9 447.9    2. Celiac artery stenosis, status post stent I77.4 447.4     continue with carotid surveillance status post radiation therapy to the neck  We will surveilled celiac stent next visit  No orders of the defined types were placed in this encounter.       Follow-up Disposition:  Return in about 6 months (around 12/27/2017). Min Gramajo MD    PLEASE NOTE:  This document has been produced using voice recognition software. Unrecognized errors in transcription may be present.

## 2017-06-30 DIAGNOSIS — Z93.1 S/P PERCUTANEOUS ENDOSCOPIC GASTROSTOMY (PEG) TUBE PLACEMENT (HCC): ICD-10-CM

## 2017-06-30 RX ORDER — CYCLOBENZAPRINE HCL 5 MG
5 TABLET ORAL
Qty: 25 TAB | Refills: 0 | Status: SHIPPED | OUTPATIENT
Start: 2017-06-30 | End: 2017-07-25 | Stop reason: SDUPTHER

## 2017-07-06 ENCOUNTER — HOSPITAL ENCOUNTER (OUTPATIENT)
Dept: NEUROLOGY | Age: 62
Discharge: HOME | End: 2017-07-06
Attending: NURSE PRACTITIONER
Payer: MEDICARE

## 2017-07-06 DIAGNOSIS — R56.9 SEIZURES (HCC): ICD-10-CM

## 2017-07-06 PROCEDURE — 95816 EEG AWAKE AND DROWSY: CPT

## 2017-07-06 PROCEDURE — 95819 EEG AWAKE AND ASLEEP: CPT

## 2017-07-06 NOTE — PROGRESS NOTES
Sleep deprived EEG was not achieved despite her getting 4.5 hours of sleep. Awake recording was achieved.

## 2017-07-07 NOTE — PROCEDURES
Piter Hastings    Name:  Smiley Chapa  MR#:  [id number]  :  1955  Account #:  [de-identified]  Date of Adm:  2017  Date of Service:  2017      The study was performedstudy utilizing both referential and  differential montages. MEDICATIONS  Include:  1. Levothyroxine. 2. Omeprazole. 3. Gabapentin. 4. Aspirin. 5. Keppra. 6. Morphine. 7. Albuterol. The patient's  describes  arm shaking, blank stare  and unresponsiveness lasting for about a minute or so. EEG  examination was performed as an outpatient utilizing both referential  and differential montages as well as International 10-20 electrode  placement system on an 18-channel EEG instrument. Study was done  with the patient awake. She demonstrates low voltage mixed  frequency, background activity frequently obscured by movement  artifact. At times 10 Hz background activity can be seen, but the  recording is dominated by low voltage fast activity seen of highest  amplitude in the anterior central regions consistent with a drug effect. Mental alerting, photic stimulation failed to elicit abnormal activity. She  did not appear to enter sleep. There were no focal lateralized or  abnormal paroxysmal discharges noted on single-channel EKG  monitoring. No cardiac ectopy was appreciated. ELECTROENCEPHALOGRAM INTERPRETATION: Normal awake  recording with prominent beta activity in the anterior central region  suggestive of drug effect.         MD KAYLEE Willson / JAMIA  D:  2017   14:13  T:  2017   23:56  Job #:  [id number]

## 2017-07-25 DIAGNOSIS — Z93.1 S/P PERCUTANEOUS ENDOSCOPIC GASTROSTOMY (PEG) TUBE PLACEMENT (HCC): ICD-10-CM

## 2017-07-25 RX ORDER — CYCLOBENZAPRINE HCL 5 MG
5 TABLET ORAL
Qty: 25 TAB | Refills: 0 | Status: SHIPPED | OUTPATIENT
Start: 2017-07-25 | End: 2017-08-18 | Stop reason: SDUPTHER

## 2017-08-03 ENCOUNTER — OFFICE VISIT (OUTPATIENT)
Dept: NEUROLOGY | Age: 62
End: 2017-08-03

## 2017-08-03 VITALS
SYSTOLIC BLOOD PRESSURE: 120 MMHG | BODY MASS INDEX: 14.98 KG/M2 | HEIGHT: 62 IN | DIASTOLIC BLOOD PRESSURE: 80 MMHG | RESPIRATION RATE: 16 BRPM | HEART RATE: 116 BPM | WEIGHT: 81.4 LBS | OXYGEN SATURATION: 98 %

## 2017-08-03 DIAGNOSIS — R56.9 SEIZURES (HCC): ICD-10-CM

## 2017-08-03 DIAGNOSIS — R56.9 SEIZURES (HCC): Primary | ICD-10-CM

## 2017-08-03 NOTE — PROGRESS NOTES
[address], [address], [place], [address]  27 Dalia Freeman. [facility], [address]  Office:  [phone]  Fax: [phone]  Chief Complaint   Patient presents with    Neurologic Problem     F/U Seizures and EEG results       This is a 63year old female who presents for follow up. She denies seizure activity. Denies occult seizure symptoms. Taking Keppra 1000 mg BID through her PED tube. Tolerating this well. Denies side effects. Adequate supply. She completed her EEGs and is here to discuss the results. Her  said after her seizure her hearing has improved and wonders if this is common. Overall she is happy and doing well. She saw her PCP back in March and are scheduled for a follow up soon. She is scheduled to have blood work the end of this month. She has an appointment with the orthopedist to discuss the results of her bone density scan. She mentions this is low and will need to start therapy. She is using the walker for assistance. Denies falls. Past Medical History:   Diagnosis Date    Age-related osteoporosis with current pathological fracture 5/21/2017    Cigarette smoker     Closed avulsion fracture of greater trochanter of femur ([address] 4/7/2017    COPD (chronic obstructive pulmonary disease) ([address]     Esophageal stricture 2014    s/p XRT for oropharyngeal cancer; requiring GJ tube for nutrition. 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    s/p stent    Vitamin D deficiency        Past Surgical History:   Procedure Laterality Date    ABDOMEN SURGERY PROC UNLISTED      \"ulcer\" surgery    HX ENDOSCOPY  5/9/2014    w/ dilation    HX ENDOSCOPY  5/13/2014    w/ dilation    HX ENDOSCOPY  5/21    with Dilatation    HX ENDOSCOPY  6/4/2014    w/ dilation    HX GI      HX ORTHOPAEDIC      intramedullary chao fixation of the right tiba    HX OTHER SURGICAL      skin cancer removal from right shoulder; PEG TUBE    HX TRACHEOSTOMY         Current Outpatient Prescriptions   Medication Sig Dispense Refill    cyclobenzaprine (FLEXERIL) 5 mg tablet Take 1 Tab by mouth three (3) times daily as needed for Muscle Spasm(s). 25 Tab 0    morphine 10 mg/5 mL oral solution 2.5ml or 5ml by peg tube every 8 hours as needed for pain 100 mL 0    amino acids-protein hydrolys (PROSOURCE NO CARB) 15-60 gram-kcal/30 mL lipk Take 30 mL by mouth daily.  levETIRAcetam (KEPPRA) 100 mg/ml soln oral solution 10 mL by Per G Tube route two (2) times a day. 600 mL 5    levothyroxine (SYNTHROID) 75 mcg tablet 0.5 Tabs by Per G Tube route Daily (before breakfast). Indications: hypothyroidism 15 Tab 0    lactobacillus rhamnosus gg 10 billion cell (CULTURELLE) 10 billion cell capsule 1 Cap by Per G Tube route Before breakfast and dinner. 60 Cap 0    melatonin 3 mg tablet 1 Tab by Per G Tube route nightly as needed. 30 Tab 0    albuterol (PROVENTIL VENTOLIN) 2.5 mg /3 mL (0.083 %) nebulizer solution 2.5 mg by Nebulization route as needed for Wheezing.  cholecalciferol, vitamin D3, (VITAMIN D3) 2,000 unit tab 1 Tab by PEG Tube route daily.  omeprazole (PRILOSEC) 2.5 mg suDR delayed release suspension 20 mg by Per G Tube route two (2) times a day.  lidocaine HCl (XYLOCAINE) 3 % topical cream Apply  to affected area two (2) times a day. Indications: WITH DESITIN      aspirin 81 mg chewable tablet 81 mg by PEG Tube route daily.  gabapentin (NEURONTIN) 300 mg capsule 300 mg by PEG Tube route three (3) times daily.  cyanocobalamin (VITAMIN B12) 500 mcg tablet 500 mcg by PEG Tube route daily.  Via peg       multivitamin (THERAGRAN) liquid 10 mL by PEG Tube route daily.  potassium chloride (K-DUR, KLOR-CON) 10 mEq tablet 10 mEq by Feeding Tube route daily.  OXYGEN-AIR DELIVERY SYSTEMS 2 L/min by Nasal route as needed. Allergies   Allergen Reactions    Percocet [Oxycodone-Acetaminophen] Itching and Hives    Percodan [Oxycodone-Aspirin] Rash and Itching    Plavix [Clopidogrel] Rash and Hives       Social History   Substance Use Topics    Smoking status: Current Every Day Smoker     Packs/day: 0.50     Types: Cigarettes    Smokeless tobacco: Never Used      Comment: DENIES BUT HEAVY ODOR NOTED ON PT. THEN SPOUSE STATES OCCASSIONAL    Alcohol use 8.4 oz/week     14 Cans of beer, 0 Standard drinks or equivalent per week       Family History   Problem Relation Age of Onset    Heart Disease Mother        Review of Systems  Pertinent positives and negatives as noted otherwise comprehensive review is negative. Examination  Visit Vitals    /80    Pulse (!) 116    Resp 16    Ht 5' 2\" (1.575 m)    Wt 36.9 kg (81 lb 6.4 oz)    SpO2 98%    BMI 14.89 kg/m2     This is a very pleasant 63year old female, thin, in no apparent distress. No icterus. Heart regular. No murmur. No edema. Neurologically, she is awake, alert, and oriented with normal speech and language. Her cognition is normal.  She is able to discuss her medical history. She is able to discuss her medications. She is able to discuss current events. She has no abnormal movement. She has no pronation or drift. Reflexes are symmetrical in the upper and lower extremities bilaterally. She ambulated with walker for assistance. Impression/Plan  Marycarmen Bates is a 63 y.o. female whose history and physical are consistent with seizures. Maintained seizure free. Tolerating Keppra 1000 mg BID through her PED. We discussed EEG with no epileptiform. Ensured adequate supply.    We will obtain Keppra level for reference although she has been seizure free I do not see one on file.  Have this lab completed when convenient for her. She will follow up in 3 months. As long as there are no changes her subsequent follow up can be increased to 6 months out. Discussed safety and when to call  911. Answered questions. Patient is agreeable to plan of care. Call office with concerns. Diagnoses and all orders for this visit:    1. Seizures (HCC)  -     LEVETIRACETAM (KEPPRA); Future    Total time: 25 min   Counseling / coordination time: 23 min   > 50% counseling / coordination?: Yes re: results, symptoms, safety, plan of care, side effects, and answering questions. Brock Whitehead NP  This note will not be viewable in [address]

## 2017-08-03 NOTE — MR AVS SNAPSHOT
Visit Information Date & Time Provider Department Dept. Phone Encounter #  
 8/3/2017 11:30 AM Hubert Valles NP [facility] [phone] [id number] Follow-up Instructions Return in about 3 months (around 11/3/2017). Your Appointments 8/29/2017  9:35 AM  
LAB with [facility] NURSE VISIT Internist of [facility] ([address]) Appt Note: lab  
 [address] [id number] [address]  
  
   
 [address], [address]  
  
    
 9/5/2017 10:00 AM  
Office Visit with Tramaine Perdomo MD  
Internist of [address] [place] [address]) Appt Note: ov bs  
 [address] [facility], [address] [address] [address] [place]  
  
   
 [address] [facility], [address]  
  
    
 1/4/2018  9:00 AM  
PROCEDURE with [facility] 1 [facility] ([address] [place] [address]) Appt Note: celiac 6mo zoran [address] [facility] [address] [facility] [address]  
[phone] [address] [facility] 1M07  
  
    
 1/4/2018 10:00 AM  
PROCEDURE with [facility] 1 [facility] ([address] [place] [address]) Appt Note: cv 6mo zoran [address] [facility] [address] [facility] [address]  
[phone] 1/16/2018 10:45 AM  
Office Visit with Franci Watt MD  
[address] [place] [address] [facility] [address] [place] [address]) Appt Note: 6mo ck with test on 01/04/17 [address] [facility] [address] [facility] [address]  
[phone] 2300 [facility] [address] [facility] [address] Upcoming Health Maintenance Date Due INFLUENZA AGE 9 TO ADULT 8/1/2017 Pneumococcal 19-64 Highest Risk (3 of 3 - PCV13) 10/13/2017 MEDICARE YEARLY EXAM 3/3/2018 BREAST CANCER SCRN MAMMOGRAM 10/13/2018 PAP AKA CERVICAL CYTOLOGY 11/17/2018 COLONOSCOPY 1/10/2023 DTaP/Tdap/Td series (2 - Td) 3/6/2027 Allergies as of 8/3/2017  Review Complete On: 8/3/2017 By: Nav Blackwell LPN Severity Noted Reaction Type Reactions Percocet [Oxycodone-acetaminophen]    Itching, Hives Percodan [Oxycodone-aspirin]  05/21/2014    Rash, Itching Plavix [Clopidogrel]  04/10/2013    Rash, Hives Current Immunizations  Reviewed on 10/13/2016 Name Date Influenza Vaccine (Quad) PF 10/13/2016  3:26 PM, 11/17/2015  3:55 PM  
 Influenza Vaccine PF 11/4/2014, 10/21/2013  1:03 PM  
 Pneumococcal Polysaccharide (PPSV-23) 10/13/2016  3:52 PM  
 Tdap 3/6/2017 Not reviewed this visit You Were Diagnosed With   
  
 Codes Comments Seizures ([facility].)    -  Primary ICD-10-CM: R56.9 ICD-9-CM: 780.39 Vitals BP Pulse Resp Height(growth percentile) Weight(growth percentile) SpO2  
 120/80 (!) 116 16 5' 2\" (1.575 m) 81 lb 6.4 oz (36.9 kg) 98% BMI OB Status Smoking Status 14.89 kg/m2 Postmenopausal Current Every Day Smoker BMI and BSA Data Body Mass Index Body Surface Area  
 14.89 kg/m 2 1.27 m 2 Preferred Pharmacy Pharmacy Name Phone [address] [place] [address], [address] [place] [address] [phone] Your Updated Medication List  
  
   
This list is accurate as of: 8/3/17 11:56 AM.  Always use your most recent med list.  
  
  
  
  
 albuterol 2.5 mg /3 mL (0.083 %) nebulizer solution Commonly known as:  PROVENTIL VENTOLIN  
2.5 mg by Nebulization route as needed for Wheezing. aspirin 81 mg chewable tablet 81 mg by PEG Tube route daily. cyanocobalamin 500 mcg tablet Commonly known as:  VITAMIN B12  
500 mcg by PEG Tube route daily. Via peg  
  
 cyclobenzaprine 5 mg tablet Commonly known as:  FLEXERIL Take 1 Tab by mouth three (3) times daily as needed for Muscle Spasm(s). gabapentin 300 mg capsule Commonly known as:  NEURONTIN  
300 mg by PEG Tube route three (3) times daily. lactobacillus rhamnosus gg 10 billion cell 10 billion cell capsule Commonly known as:  CULTURELLE  
1 Cap by Per G Tube route Before breakfast and dinner. levETIRAcetam 100 mg/ml Soln oral solution Commonly known as:  KEPPRA 10 mL by Per G Tube route two (2) times a day. levothyroxine 75 mcg tablet Commonly known as:  synthroid  
0.5 Tabs by Per G Tube route Daily (before breakfast). Indications: hypothyroidism  
  
 lidocaine HCl 3 % topical cream  
Commonly known as:  XYLOCAINE Apply  to affected area two (2) times a day. Indications: WITH DESITIN  
  
 melatonin 3 mg tablet 1 Tab by Per G Tube route nightly as needed. morphine 10 mg/5 mL oral solution 2.5ml or 5ml by peg tube every 8 hours as needed for pain  
  
 multivitamin liquid Commonly known as:  THERAGRAN  
10 mL by PEG Tube route daily. omeprazole 2.5 mg Sudr delayed release suspension Commonly known as:  PRILOSEC  
20 mg by Per G Tube route two (2) times a day. OXYGEN-AIR DELIVERY SYSTEMS  
2 L/min by Nasal route as needed. potassium chloride 10 mEq tablet Commonly known as:  KLOR-CON 10 mEq by Feeding Tube route daily. PROSOURCE NO CARB 15-60 gram-kcal/30 mL Lipk Generic drug:  amino acids-protein hydrolys Take 30 mL by mouth daily. VITAMIN D3 2,000 unit Tab Generic drug:  cholecalciferol (vitamin D3) 1 Tab by PEG Tube route daily. Follow-up Instructions Return in about 3 months (around 11/3/2017). To-Do List   
 08/03/2017 Lab:  LEVETIRACETAM (KEPPRA) Please provide this summary of care documentation to your next provider. Your primary care clinician is listed as Orinda Habermann. If you have any questions after today's visit, please call [phone].

## 2017-08-09 DIAGNOSIS — Z93.1 S/P PERCUTANEOUS ENDOSCOPIC GASTROSTOMY (PEG) TUBE PLACEMENT (HCC): ICD-10-CM

## 2017-08-09 RX ORDER — MORPHINE SULFATE ORAL SOLUTION 10 MG/5ML
SOLUTION ORAL
Qty: 100 ML | Refills: 0 | Status: SHIPPED | OUTPATIENT
Start: 2017-08-09 | End: 2017-10-10 | Stop reason: SDUPTHER

## 2017-08-09 NOTE — TELEPHONE ENCOUNTER
Reviewed report generated by the [facility]. Does not demonstrate aberrancies or inconsistencies with regard to the prescribing of controlled medications to this patient by other providers. Last filled morphine 6/15/2017 per . Patient will need to sign a controlled substance agreement at next visit.

## 2017-08-18 DIAGNOSIS — Z93.1 S/P PERCUTANEOUS ENDOSCOPIC GASTROSTOMY (PEG) TUBE PLACEMENT (HCC): ICD-10-CM

## 2017-08-18 RX ORDER — GABAPENTIN 300 MG/1
300 CAPSULE ORAL 3 TIMES DAILY
Qty: 90 CAP | Refills: 3 | Status: SHIPPED | OUTPATIENT
Start: 2017-08-18 | End: 2018-01-01 | Stop reason: SDUPTHER

## 2017-08-18 RX ORDER — CYCLOBENZAPRINE HCL 5 MG
5 TABLET ORAL
Qty: 25 TAB | Refills: 0 | Status: SHIPPED | OUTPATIENT
Start: 2017-08-18 | End: 2017-09-12 | Stop reason: SDUPTHER

## 2017-08-29 ENCOUNTER — HOSPITAL ENCOUNTER (OUTPATIENT)
Dept: LAB | Age: 62
Discharge: HOME | End: 2017-08-29
Payer: MEDICARE

## 2017-08-29 ENCOUNTER — LAB ONLY (OUTPATIENT)
Dept: INTERNAL MEDICINE CLINIC | Age: 62
End: 2017-08-29

## 2017-08-29 DIAGNOSIS — Z00.00 ROUTINE GENERAL MEDICAL EXAMINATION AT A HEALTH CARE FACILITY: Primary | ICD-10-CM

## 2017-08-29 DIAGNOSIS — Z00.00 ROUTINE GENERAL MEDICAL EXAMINATION AT A HEALTH CARE FACILITY: ICD-10-CM

## 2017-08-29 LAB
ALBUMIN SERPL-MCNC: 3.4 G/DL (ref 3.4–5)
ALBUMIN/GLOB SERPL: 0.9 {RATIO} (ref 0.8–1.7)
ALP SERPL-CCNC: 243 U/L (ref 45–117)
ALT SERPL-CCNC: 34 U/L (ref 13–56)
ANION GAP SERPL CALC-SCNC: 9 MMOL/L (ref 3–18)
AST SERPL-CCNC: 55 U/L (ref 15–37)
BASOPHILS # BLD: 0.1 K/UL (ref 0–0.06)
BASOPHILS NFR BLD: 1 % (ref 0–2)
BILIRUB SERPL-MCNC: 0.5 MG/DL (ref 0.2–1)
BUN SERPL-MCNC: 20 MG/DL (ref 7–18)
BUN/CREAT SERPL: 28 (ref 12–20)
CALCIUM SERPL-MCNC: 9.4 MG/DL (ref 8.5–10.1)
CHLORIDE SERPL-SCNC: 97 MMOL/L (ref 100–108)
CHOLEST SERPL-MCNC: 269 MG/DL
CO2 SERPL-SCNC: 24 MMOL/L (ref 21–32)
CREAT SERPL-MCNC: 0.71 MG/DL (ref 0.6–1.3)
DIFFERENTIAL METHOD BLD: ABNORMAL
EOSINOPHIL # BLD: 0 K/UL (ref 0–0.4)
EOSINOPHIL NFR BLD: 1 % (ref 0–5)
ERYTHROCYTE [DISTWIDTH] IN BLOOD BY AUTOMATED COUNT: 13.6 % (ref 11.6–14.5)
GLOBULIN SER CALC-MCNC: 4 G/DL (ref 2–4)
GLUCOSE SERPL-MCNC: 93 MG/DL (ref 74–99)
HCT VFR BLD AUTO: 40.2 % (ref 35–45)
HDLC SERPL-MCNC: 129 MG/DL (ref 40–60)
HDLC SERPL: 2.1 {RATIO} (ref 0–5)
HGB BLD-MCNC: 13.2 G/DL (ref 12–16)
LDLC SERPL CALC-MCNC: 116.8 MG/DL (ref 0–100)
LIPID PROFILE,FLP: ABNORMAL
LYMPHOCYTES # BLD: 0.6 K/UL (ref 0.9–3.6)
LYMPHOCYTES NFR BLD: 7 % (ref 21–52)
MCH RBC QN AUTO: 31.9 PG (ref 24–34)
MCHC RBC AUTO-ENTMCNC: 32.8 G/DL (ref 31–37)
MCV RBC AUTO: 97.1 FL (ref 74–97)
MONOCYTES # BLD: 0.8 K/UL (ref 0.05–1.2)
MONOCYTES NFR BLD: 10 % (ref 3–10)
NEUTS SEG # BLD: 6.7 K/UL (ref 1.8–8)
NEUTS SEG NFR BLD: 81 % (ref 40–73)
PLATELET # BLD AUTO: 289 K/UL (ref 135–420)
PMV BLD AUTO: 11.4 FL (ref 9.2–11.8)
POTASSIUM SERPL-SCNC: 4.9 MMOL/L (ref 3.5–5.5)
PROT SERPL-MCNC: 7.4 G/DL (ref 6.4–8.2)
RBC # BLD AUTO: 4.14 M/UL (ref 4.2–5.3)
SODIUM SERPL-SCNC: 130 MMOL/L (ref 136–145)
TRIGL SERPL-MCNC: 116 MG/DL (ref ?–150)
TSH SERPL DL<=0.05 MIU/L-ACNC: 2.73 UIU/ML (ref 0.36–3.74)
VLDLC SERPL CALC-MCNC: 23.2 MG/DL
WBC # BLD AUTO: 8.2 K/UL (ref 4.6–13.2)

## 2017-08-29 PROCEDURE — 80053 COMPREHEN METABOLIC PANEL: CPT | Performed by: INTERNAL MEDICINE

## 2017-08-29 PROCEDURE — 85025 COMPLETE CBC W/AUTO DIFF WBC: CPT | Performed by: INTERNAL MEDICINE

## 2017-08-29 PROCEDURE — 36415 COLL VENOUS BLD VENIPUNCTURE: CPT | Performed by: INTERNAL MEDICINE

## 2017-08-29 PROCEDURE — 84443 ASSAY THYROID STIM HORMONE: CPT | Performed by: INTERNAL MEDICINE

## 2017-08-29 PROCEDURE — 80061 LIPID PANEL: CPT | Performed by: INTERNAL MEDICINE

## 2017-09-05 ENCOUNTER — HOSPITAL ENCOUNTER (OUTPATIENT)
Dept: LAB | Age: 62
Discharge: HOME | End: 2017-09-05
Payer: MEDICARE

## 2017-09-05 ENCOUNTER — OFFICE VISIT (OUTPATIENT)
Dept: INTERNAL MEDICINE CLINIC | Age: 62
End: 2017-09-05

## 2017-09-05 VITALS
HEART RATE: 119 BPM | OXYGEN SATURATION: 96 % | DIASTOLIC BLOOD PRESSURE: 88 MMHG | WEIGHT: 80.6 LBS | RESPIRATION RATE: 12 BRPM | SYSTOLIC BLOOD PRESSURE: 130 MMHG | TEMPERATURE: 98.5 F | BODY MASS INDEX: 14.83 KG/M2 | HEIGHT: 62 IN

## 2017-09-05 DIAGNOSIS — R79.9 ABNORMAL FINDING OF BLOOD CHEMISTRY: ICD-10-CM

## 2017-09-05 DIAGNOSIS — I10 ESSENTIAL HYPERTENSION: ICD-10-CM

## 2017-09-05 DIAGNOSIS — I77.9 BILATERAL CAROTID ARTERY DISEASE (HCC): ICD-10-CM

## 2017-09-05 DIAGNOSIS — R74.9 ABNORMAL SERUM ENZYME LEVEL: ICD-10-CM

## 2017-09-05 DIAGNOSIS — Z92.3 HISTORY OF RADIATION TO HEAD AND NECK REGION: ICD-10-CM

## 2017-09-05 DIAGNOSIS — R74.02 NONSPECIFIC ELEVATION OF LEVELS OF TRANSAMINASE AND LACTIC ACID DEHYDROGENASE (LDH): ICD-10-CM

## 2017-09-05 DIAGNOSIS — R79.89 ELEVATED LFTS: Primary | ICD-10-CM

## 2017-09-05 DIAGNOSIS — J44.9 CHRONIC OBSTRUCTIVE PULMONARY DISEASE, UNSPECIFIED COPD TYPE (HCC): ICD-10-CM

## 2017-09-05 DIAGNOSIS — R79.89 ELEVATED LFTS: ICD-10-CM

## 2017-09-05 DIAGNOSIS — Z23 ENCOUNTER FOR IMMUNIZATION: ICD-10-CM

## 2017-09-05 DIAGNOSIS — Z93.1 S/P PERCUTANEOUS ENDOSCOPIC GASTROSTOMY (PEG) TUBE PLACEMENT (HCC): ICD-10-CM

## 2017-09-05 DIAGNOSIS — F17.200 CURRENT SMOKER: ICD-10-CM

## 2017-09-05 DIAGNOSIS — S72.111S: ICD-10-CM

## 2017-09-05 DIAGNOSIS — R56.9 SEIZURES (HCC): ICD-10-CM

## 2017-09-05 DIAGNOSIS — R74.01 NONSPECIFIC ELEVATION OF LEVELS OF TRANSAMINASE AND LACTIC ACID DEHYDROGENASE (LDH): ICD-10-CM

## 2017-09-05 DIAGNOSIS — M80.00XS AGE-RELATED OSTEOPOROSIS WITH CURRENT PATHOLOGICAL FRACTURE, SEQUELA: ICD-10-CM

## 2017-09-05 DIAGNOSIS — C10.9 SQUAMOUS CELL CARCINOMA OF OROPHARYNX (HCC): ICD-10-CM

## 2017-09-05 DIAGNOSIS — E87.1 HYPONATREMIA: ICD-10-CM

## 2017-09-05 DIAGNOSIS — E03.9 ACQUIRED HYPOTHYROIDISM: ICD-10-CM

## 2017-09-05 DIAGNOSIS — R13.13 DYSPHAGIA, CRICOPHARYNGEAL: ICD-10-CM

## 2017-09-05 DIAGNOSIS — E43 SEVERE PROTEIN-CALORIE MALNUTRITION (HCC): ICD-10-CM

## 2017-09-05 DIAGNOSIS — E55.9 VITAMIN D DEFICIENCY: ICD-10-CM

## 2017-09-05 LAB
ALBUMIN SERPL-MCNC: 3.8 G/DL (ref 3.4–5)
ALBUMIN/GLOB SERPL: 1 {RATIO} (ref 0.8–1.7)
ALP SERPL-CCNC: 230 U/L (ref 45–117)
ALT SERPL-CCNC: 41 U/L (ref 13–56)
ANION GAP SERPL CALC-SCNC: 15 MMOL/L (ref 3–18)
AST SERPL-CCNC: 104 U/L (ref 15–37)
BILIRUB SERPL-MCNC: 0.7 MG/DL (ref 0.2–1)
BUN SERPL-MCNC: 14 MG/DL (ref 7–18)
BUN/CREAT SERPL: 16 (ref 12–20)
CALCIUM SERPL-MCNC: 9.6 MG/DL (ref 8.5–10.1)
CHLORIDE SERPL-SCNC: 96 MMOL/L (ref 100–108)
CO2 SERPL-SCNC: 19 MMOL/L (ref 21–32)
CREAT SERPL-MCNC: 0.88 MG/DL (ref 0.6–1.3)
FERRITIN SERPL-MCNC: 140 NG/ML (ref 8–388)
GLOBULIN SER CALC-MCNC: 3.9 G/DL (ref 2–4)
GLUCOSE SERPL-MCNC: 81 MG/DL (ref 74–99)
IRON SATN MFR SERPL: 40 %
IRON SERPL-MCNC: 129 UG/DL (ref 50–175)
POTASSIUM SERPL-SCNC: 4.3 MMOL/L (ref 3.5–5.5)
PROT SERPL-MCNC: 7.7 G/DL (ref 6.4–8.2)
SODIUM SERPL-SCNC: 130 MMOL/L (ref 136–145)
TIBC SERPL-MCNC: 321 UG/DL (ref 250–450)

## 2017-09-05 PROCEDURE — 82390 ASSAY OF CERULOPLASMIN: CPT | Performed by: INTERNAL MEDICINE

## 2017-09-05 PROCEDURE — 82977 ASSAY OF GGT: CPT | Performed by: INTERNAL MEDICINE

## 2017-09-05 PROCEDURE — 80074 ACUTE HEPATITIS PANEL: CPT | Performed by: INTERNAL MEDICINE

## 2017-09-05 PROCEDURE — 83516 IMMUNOASSAY NONANTIBODY: CPT | Performed by: INTERNAL MEDICINE

## 2017-09-05 PROCEDURE — 83540 ASSAY OF IRON: CPT | Performed by: INTERNAL MEDICINE

## 2017-09-05 PROCEDURE — 80053 COMPREHEN METABOLIC PANEL: CPT | Performed by: INTERNAL MEDICINE

## 2017-09-05 PROCEDURE — 82728 ASSAY OF FERRITIN: CPT | Performed by: INTERNAL MEDICINE

## 2017-09-05 PROCEDURE — 36415 COLL VENOUS BLD VENIPUNCTURE: CPT | Performed by: INTERNAL MEDICINE

## 2017-09-05 NOTE — LETTER
2017 10:32 PM 
 
Ms. Jewell Mejia [address] [place] [id number] To Whom It May Concern: This letter is concerning jury duty for Jewell Mejia, : 1955, followed at  [place] [address]. Due to multiple medical problems, this patient must be excused from jury duty at this time. Please contact the office if you need further information or have any questions. Sincerely, Tramaine Perdomo MD

## 2017-09-05 NOTE — PROGRESS NOTES
Chief Complaint   Patient presents with    Anemia     follow up room 4    Hypothyroidism     follow up    Labs     done 8-29-17 to review     Patient was given a copy of the Advanced Directive form and understands to bring it in once completed. 1. Have you been to the ER, urgent care clinic since your last visit? Hospitalized since your last visit? No    2. Have you seen or consulted any other health care providers outside of the [address] [place] [address] since your last visit? Include any pap smears or colon screening.  No

## 2017-09-05 NOTE — PATIENT INSTRUCTIONS
Patient was given a copy of the Advanced Directive form and understands to bring it in once completed  There are no preventive care reminders to display for this patient. Learning About Benefits From Quitting Smoking  How does quitting smoking make you healthier? If you're thinking about quitting smoking, you may have a few reasons to be smoke-free. Your health may be one of them. · When you quit smoking, you lower your risks for cancer, lung disease, heart attack, stroke, blood vessel disease, and blindness from macular degeneration. · When you're smoke-free, you get sick less often, and you heal faster. You are less likely to get colds, flu, bronchitis, and pneumonia. · As a nonsmoker, you may find that your mood is better and you are less stressed. When and how will you feel healthier? Quitting has real health benefits that start from day 1 of being smoke-free. And the longer you stay smoke-free, the healthier you get and the better you feel. The first hours  · After just 20 minutes, your blood pressure and heart rate go down. That means there's less stress on your heart and blood vessels. · Within 12 hours, the level of carbon monoxide in your blood drops back to normal. That makes room for more oxygen. With more oxygen in your body, you may notice that you have more energy than when you smoked. After 2 weeks  · Your lungs start to work better. · Your risk of heart attack starts to drop. After 1 month  · When your lungs are clear, you cough less and breathe deeper, so it's easier to be active. · Your sense of taste and smell return. That means you can enjoy food more than you have since you started smoking. Over the years  · After 1 year, your risk of heart disease is half what it would be if you kept smoking. · After 5 years, your risk of stroke starts to shrink. Within a few years after that, it's about the same as if you'd never smoked.   · After 10 years, your risk of dying from lung cancer is cut by about half. And your risk for many other types of cancer is lower too. How would quitting help others in your life? When you quit smoking, you improve the health of everyone who now breathes in your smoke. · Their heart, lung, and cancer risks drop, much like yours. · They are sick less. For babies and small children, living smoke-free means they're less likely to have ear infections, pneumonia, and bronchitis. · If you're a woman who is or will be pregnant someday, quitting smoking means a healthier . · Children who are close to you are less likely to become adult smokers. Where can you learn more? Go to http://dayron-raj.info/. Enter 052 [id number] 72 11 in the search box to learn more about \"Learning About Benefits From Quitting Smoking. \"  Current as of: 2017  Content Version: 11.3  © 9556-1928 [employer]. Care instructions adapted under license by [employer] (which disclaims liability or warranty for this information). If you have questions about a medical condition or this instruction, always ask your healthcare professional. [place] [address] any warranty or liability for your use of this information.

## 2017-09-06 ENCOUNTER — TELEPHONE (OUTPATIENT)
Dept: INTERNAL MEDICINE CLINIC | Age: 62
End: 2017-09-06

## 2017-09-06 DIAGNOSIS — R79.89 ELEVATED LIVER FUNCTION TESTS: Primary | ICD-10-CM

## 2017-09-06 LAB
ACTIN IGG SERPL-ACNC: 20 UNITS (ref 0–19)
CERULOPLASMIN SERPL-MCNC: 35 MG/DL (ref 19–39)
GGT SERPL-CCNC: 269 IU/L (ref 0–60)
HAV IGM SER QL: NEGATIVE
HBV CORE IGM SER QL: NEGATIVE
HBV SURFACE AG SER QL: <0.1 INDEX
HBV SURFACE AG SER QL: NEGATIVE
HCV AB SER IA-ACNC: 0.1 INDEX
HCV AB SERPL QL IA: NEGATIVE
HCV COMMENT,HCGAC: NORMAL
MITOCHONDRIA M2 IGG SER-ACNC: 4.2 UNITS (ref 0–20)
SP1: NORMAL
SP2: NORMAL
SP3: NORMAL

## 2017-09-06 NOTE — TELEPHONE ENCOUNTER
Please let the patient and her  know that the blood tests drawn at her visit did confirm elevation of her liver tests, but did not uncover a cause. Would recommend proceeding with an ultrasound of her liver. Order placed. Please ask her to schedule.

## 2017-09-08 ENCOUNTER — TELEPHONE (OUTPATIENT)
Dept: INTERNAL MEDICINE CLINIC | Age: 62
End: 2017-09-08

## 2017-09-08 NOTE — PROGRESS NOTES
HPI:   Marycarmen Bates is a 63y.o. year old female who presents today for post hospitalization follow-up. She is accompanied by her . She has a history of squamous cell carcinoma of the oropharynx, COPD, hypertension, PAD, carotid artery disease, mesenteric ischemia, pulmonary nodules, chronic headaches, and carpal tunnel syndrome. She reports that she is doing relatively well. She was evaluated by Dr. Sakshi Lan regarding her recent generalized seizure-like episodes, and he recommended obtaining a repeat EEG and brain MRI. She had a sleep deprived EEG on 7/6/2017 which was a normal awake recording with prominent beta activity in the anterior central region suggestive of drug effect. She did not have the MRI performed due to anxiety despite premedication with valium. She reports that she is continuing to take [address] and has not had any recurrent seizures. She reports that she has begun to see a nutritionist in Dr. Lit Mcgrath office to help with her difficulty in gaining weight. She was been using the high protein nutritional supplement recommended during her recent hospitalization, but she has begun to try taking liquified table food by her G-tube. She states that she has had significant diarrhea after attempting to have liquified meat and potatoes, but she is continuing to try different foods to see what she may find tolerable. She is also requesting a handicapped sticker and is in need of an excuse letter for jury duty. She is otherwise without complaints. She was admitted to [facility] from 4/7-4/9/2017 after tripping at home and developing right hip pain. She was found to have a non-displaced comminuted fracture of the greater trochanter of the right femur. Orthopedics was consulted and non-surgical intervention and patient was subsequently discharged with plans for home physical therapy.  However, several hours after getting home, patient developed two seizure like episodes manifesting as a chewing motion with upper arm shaking. After the second episode, she became unresponsive, EMS was called and she was transported back to [facility]. While in the ED, she had multiple episodes of staring with right facial twitching, and while being evaluated by Dr. Yesika Angel of neurology, she developed a generalized tonic-clonic seizure with deviation of her eyes and head/neck to the right. She was loaded with Keppra, and the seizure was terminated. She did remain post-ictal for some time after episode. Evaluation in the ED included a stat EEG which was obtained after the seizure, and it showed interictal epileptiform discharges. Head CT scan showed atrophy and periventricular microvascular ischemia without acute changes. Chest x-ray was negative. Urine culture was positive for Klebsiella pneumoniae and she was treated with IV ceftriaxone and transitioned to po Keflex. Repeat EEG (4/10/2017) was normal without focal slowing or epileptiform activity. She remained seizure free on Keppra, and was discharged on 4/13/2017 to [facility]. She returned home on 4/25/2017. 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 She underwent panendoscopy with biopsy, PEG tube placement and tracheostomy on 5/13/2013. She was subsequently treated with radiation and chemotherapy (Taxol and Cisplatin), completed 8/6/2013. She has had no clinical evidence of recurrent disease, with negative serial PET scans/ CT scans of the chest and neck. A follow-up PET scan was obtained in 2/4/2016 which revealed no tumor activity in the head or neck, but multifocal patchy nodular activity in the lungs (right > left), could be inflammatory although could also be consistent with metastases. A chest CT scan was obtained 2/26/16 revealing new bilateral pulmonary nodules, also consistent with inflammation vs. metastases.  She had a repeat chest CT scan on 6/21/2016, which showed that all of the lung nodular densities had improved in appearance and decreased in size, with no new or enlarging pulmonary nodules or enlarged lymph nodes demonstrated. She also had a neck CT scan (6/21/2016) which was negative for enlarged cervical lymph nodes, but there was an amorphous soft tissue density diffusely within the parapharyngeal fat, which was most likely post-therapy change rather than neoplastic recurrence since there was no discrete masslike focal accumulation of tissue. She had a repeat PET scan (1/21/2017) which showed marked interval improvement of multifocal hypermetabolic lesions since 0/0458, with only a mild hypermetabolic focus in the right pretracheal mediastinum, probably correlating to a tiny lymph node, clinically reactive. She had a repeat CT scan of the neck (7/11/2017) showing no residual or recurrent mass, and CT scan of the chest (7/11/2017) showing interval development of minimal hazy interstitial infiltrate, posterior subsegment left upper lobe since prior CT in 1/2017, but no definitive evidence of metastatic disease. She is being followed by Dr. Plummer [facility]) and Dr. Ilya Fowler (oncology). Following her XRT and chemotherapy treatment, the tracheostomy was successfully removed. Her course has been complicated by esophageal stricture and aspiration, requiring G tube placement. She underwent multiple endoscopic dilatations under fluoroscopy using rendezvous techniques (10/2014) with reestablishment of continuity with her upper and lower esophagus. However, it was noted that she was having significant aspiration with both solids and liquids, due to poor laryngeal mobility from epiglottic and laryngeal scarring s/p XRT. In 11/29/2015, she was found to have a posterior left upper lobe cavitary mass on chest CT scan.  She was admitted to [facility] [place] and underwent bronchoscopy and bronchoalveolar lavage, which revealed no endobronchial obstruction or nodules and pathology was negative for malignant cells. Her QuantiFERON Gold was negative for TB. Cultures were positive for MSSA and pseudomonas and she was diagnosed with a left upper lobe abscess, thought to be secondary to aspiration. She was discharged home on 12/9/2015 with a PICC line and was treated with nafcillin and meropenem. She was again hospitalized at [facility] on 12/26/2015 when she presented with melena and leakage from PEG site and was found to have a Hb of 6.1. She was transfused and due to persistent leakage, had her G tube converted to a G-J tube by interventional radiology. She had severe hypokalemia, which required multiple IV/ per tube dosing. She underwent endoscopy by Dr. Manish Lira, but he was only able to pass the scope into the pharynx since the upper esophageal sphincter was completely fused shut. ENT was consulted about possibly attempting to open the proximal esophagus, and it was felt that the risk of perforation and mediastinal infection was too high, especially since she had adequete enteral access for nutrition. She was admitted to [facility] from 1/8/2016 to 1/22/2016 for a recurrent upper G-I bleed (Hb 6.8 requiring 4 U of PRBCs), acute hypoxic respiratory failure with interstitial pulmonary edema and bilateral pleural effusions, and candidemia (most likely from PICC line). She had an echocardiogram (1/15/2016) which showed normal LV size and function (EF 65%). Thoracentesis revealed effusions were transudative. She was treated with fluconazole, and had her G-J tube converted back to a G-Tube. She had been having difficulty with her G-tube cracking or becoming loose and falling out, but this resolved after she had the tube replaced with a larger tube in 2/2017 by TALHA Simon. She also has a history of peripheral vascular disease and presented in 2/2013 with weight loss and post-prandial pain. 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 Wei Jaramillo.  She again represented with abdominal complaints in 4/2013 and was thought to have stent restenosis since she was not taking clopidogrel due to itching. In 5/2013, she underwent a celiac arteriogram which showed that the stent was patent. In 6/2015 and 6/2016, an abdominal duplex scan showed >70% stenosis of the celiac artery, but she remains asymptomatic. In 6/21/2016, surveillance neck CT scan showed high grade bilateral internal carotid arterial stenoses. A carotid duplex scan (6/24/2016) confirmed severe (>70%) right internal carotid artery stenosis and moderate (50-69%) left internal carotid artery stenosis. On 7/20/2016, she underwent a subclavian, cerebral, vertebral, and carotid arteriogram by Dr. Nathalie Bullard, which revealed multiple areas of atherosclerosis, but no significant stenoses; the right internal carotid artery had a 50% narrowing noted while all other vessels were patent. She had a repeat carotid duplex (6/2017) which showed moderate (50-69%) stenosis bilaterally. She continues to be maintained on aspirin. According to the chart, she has a history of hepatitis C and cirrhosis. However, review of records show negative hepatitis B and C panels in 3/2014 at [facility], and CT scans of abdomen show a normal liver in 5/9/2015 and 11/9/2015. Chest CT scan (7/2017 showed mild diffuse decreased density liver, and mildly dilated 8 mm central biliary tree (but incompletely visualized). She had a screening colonoscopy in 1/2013 by Dr. Pleasant Closs which was normal.     She has a history of hypertension that was treated in the past with lisinopril. She no longer requires medication. She also has a history of hypothyroidism and is on Synthroid. Denies any cold intolerance, hair or skin changes. She has a history of osteopenia diagnosed in 2010, with bone density scan (3/2017) showing progression to osteoporosis with T-scores: femoral neck  left -3.0 / right -2.4, and lumbar -0.9.  She was treated with alendronate in 2010 for one year, but she discontinued it due to throat irritation. She continues to take calcium and Vitamin D. She was referred to see Dr. Richard Arias in 4/2017 for recommendations regarding treatment, given her prior head and neck irradiation and concern for possible increased risk for osteonecrosis with biphosphonate treatment. She reports she has upcoming appointment. She has a history of suspected COPD, with a long history of smoking 1-2 ppd. She was being treated with Flovent and albuterol-ipratropium (Duo-neb) nebulizers; however, she states that she has not needed to use these recently. She denies any shortness of breath currently, but continues to smoke at least 1 ppd. She was being followed by Dr. Clarisse Schwab for complaints of daily headaches and numbness and tingling in her left hand, attributed to probable migraines. She is being treated with gabapentin and was instructed to use a wrist splint for probable carpal tunnel syndrome. Past Medical History:   Diagnosis Date    Age-related osteoporosis with current pathological fracture 5/21/2017    Cigarette smoker     Closed avulsion fracture of greater trochanter of femur ([address] 4/7/2017    COPD (chronic obstructive pulmonary disease) ([address]     Esophageal stricture 2014    s/p XRT for oropharyngeal cancer; requiring GJ tube for nutrition.  Hypertension     Hypothyroidism     Melanoma ([address]     Mesenteric ischemia ([address]     Oropharyngeal cancer ([address] 5/2013    [id number] squamous cell carcinoma of posterion pharynx and tonsil s/p XRT and chemo.    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    s/p stent    Vitamin D deficiency      Past Surgical History:   Procedure Laterality Date    ABDOMEN SURGERY PROC UNLISTED      \"ulcer\" surgery    HX ENDOSCOPY  5/9/2014    w/ dilation    HX ENDOSCOPY  5/13/2014    w/ dilation    HX ENDOSCOPY  5/21    with Dilatation    HX ENDOSCOPY 6/4/2014    w/ dilation    HX GI      HX ORTHOPAEDIC      intramedullary chao fixation of the right tiba    HX OTHER SURGICAL      skin cancer removal from right shoulder; PEG TUBE    HX TRACHEOSTOMY       Current Outpatient Prescriptions   Medication Sig    cyclobenzaprine (FLEXERIL) 5 mg tablet 1 Tab by Per G Tube route three (3) times daily as needed for Muscle Spasm(s).  gabapentin (NEURONTIN) 300 mg capsule 1 Cap by Per G Tube route three (3) times daily.  morphine 10 mg/5 mL oral solution 2.5ml or 5ml by peg tube every 8 hours as needed for pain    amino acids-protein hydrolys (PROSOURCE NO CARB) 15-60 gram-kcal/30 mL lipk Take 30 mL by mouth daily.  levETIRAcetam (KEPPRA) 100 mg/ml soln oral solution 10 mL by Per G Tube route two (2) times a day.  levothyroxine (SYNTHROID) 75 mcg tablet 0.5 Tabs by Per G Tube route Daily (before breakfast). Indications: hypothyroidism    lactobacillus rhamnosus gg 10 billion cell (CULTURELLE) 10 billion cell capsule 1 Cap by Per G Tube route Before breakfast and dinner.  melatonin 3 mg tablet 1 Tab by Per G Tube route nightly as needed.  albuterol (PROVENTIL VENTOLIN) 2.5 mg /3 mL (0.083 %) nebulizer solution 2.5 mg by Nebulization route as needed for Wheezing.  cholecalciferol, vitamin D3, (VITAMIN D3) 2,000 unit tab 1 Tab by PEG Tube route daily.  omeprazole (PRILOSEC) 2.5 mg suDR delayed release suspension 20 mg by Per G Tube route two (2) times a day.  lidocaine HCl (XYLOCAINE) 3 % topical cream Apply  to affected area two (2) times a day. Indications: WITH DESITIN    aspirin 81 mg chewable tablet 81 mg by PEG Tube route daily.  cyanocobalamin (VITAMIN B12) 500 mcg tablet 500 mcg by PEG Tube route daily. Via peg     multivitamin (THERAGRAN) liquid 10 mL by PEG Tube route daily.  potassium chloride (K-DUR, KLOR-CON) 10 mEq tablet 10 mEq by Feeding Tube route daily. No current facility-administered medications for this visit. Allergies and Intolerances: Allergies   Allergen Reactions    Percocet [Oxycodone-Acetaminophen] Itching and Hives    Percodan [Oxycodone-Aspirin] Rash and Itching    Plavix [Clopidogrel] Rash and Hives     Family History: No FH of breast or colon cancer. Sister had uterine cancer. Family History   Problem Relation Age of Onset    Heart Disease Mother      Social History: She is  living with her . She has no children. She retired in 2013 from being the  at the [employer]. She  reports that she has been smoking Cigarettes. She has been smoking about 0.50 packs per day. She has never used smokeless tobacco.   History   Alcohol Use    8.4 oz/week    14 Cans of beer, 0 Standard drinks or equivalent per week     Immunization History:   Immunization History   Administered Date(s) Administered    Influenza Vaccine (Quad) PF 11/17/2015, 10/13/2016, 09/05/2017    Influenza Vaccine PF 10/21/2013, 11/04/2014    Pneumococcal Polysaccharide (PPSV-23) 10/13/2016    Tdap 03/06/2017       Review of Systems:   As above included in HPI. Otherwise 11 point review of systems negative including constitutional, skin, HENT, eyes, respiratory, cardiovascular, gastrointestinal, genitourinary, musculoskeletal, endo/heme/aller, neurological.    Physical:   Vitals:   BP: 130/88  HR: (!) 119  WT: 80 lb 9.6 oz (36.6 kg)  BMI:  14.74 kg/m2    Exam:   Pt appears well; alert and oriented x 3; appropriate affect. HEENT: PERRLA, anicteric, oropharynx clear, no JVD, adenopathy or thyromegaly. No carotid bruits or radiated murmur. Lungs: decreased breath sounds bilaterally, no wheezes, rhonchi, or rales. Heart: regular rate and rhythm. Tachycardic. No murmur, rubs, gallops  Abdomen: soft, nontender, nondistended, normal bowel sounds, no hepatosplenomegaly or masses. G tube in place. Extremities: without edema. Pulses 1-2+ bilaterally.     Review of Data:  Labs:  Hospital Outpatient Visit on 08/29/2017 Component Date Value Ref Range Status    WBC 08/29/2017 8.2  4.6 - 13.2 K/uL Final    RBC 08/29/2017 4.14* 4.20 - 5.30 M/uL Final    HGB 08/29/2017 13.2  12.0 - 16.0 g/dL Final    HCT 08/29/2017 40.2  35.0 - 45.0 % Final    MCV 08/29/2017 97.1* 74.0 - 97.0 FL Final    MCH 08/29/2017 31.9  24.0 - 34.0 PG Final    MCHC 08/29/2017 32.8  31.0 - 37.0 g/dL Final    RDW 08/29/2017 13.6  11.6 - 14.5 % Final    PLATELET 80/00/0040 279  135 - 420 K/uL Final    MPV 08/29/2017 11.4  9.2 - 11.8 FL Final    NEUTROPHILS 08/29/2017 81* 40 - 73 % Final    LYMPHOCYTES 08/29/2017 7* 21 - 52 % Final    MONOCYTES 08/29/2017 10  3 - 10 % Final    EOSINOPHILS 08/29/2017 1  0 - 5 % Final    BASOPHILS 08/29/2017 1  0 - 2 % Final    ABS. NEUTROPHILS 08/29/2017 6.7  1.8 - 8.0 K/UL Final    ABS. LYMPHOCYTES 08/29/2017 0.6* 0.9 - 3.6 K/UL Final    ABS. MONOCYTES 08/29/2017 0.8  0.05 - 1.2 K/UL Final    ABS. EOSINOPHILS 08/29/2017 0.0  0.0 - 0.4 K/UL Final    ABS. BASOPHILS 08/29/2017 0.1* 0.0 - 0.06 K/UL Final    DF 08/29/2017 AUTOMATED    Final    Sodium 08/29/2017 130* 136 - 145 mmol/L Final    Potassium 08/29/2017 4.9  3.5 - 5.5 mmol/L Final    Chloride 08/29/2017 97* 100 - 108 mmol/L Final    CO2 08/29/2017 24  21 - 32 mmol/L Final    Anion gap 08/29/2017 9  3.0 - 18 mmol/L Final    Glucose 08/29/2017 93  74 - 99 mg/dL Final    BUN 08/29/2017 20* 7.0 - 18 MG/DL Final    Creatinine 08/29/2017 0.71  0.6 - 1.3 MG/DL Final    BUN/Creatinine ratio 08/29/2017 28* 12 - 20   Final    GFR est AA 08/29/2017 >60  >60 ml/min/1.73m2 Final    GFR est non-AA 08/29/2017 >60  >60 ml/min/1.73m2 Final    Calcium 08/29/2017 9.4  8.5 - 10.1 MG/DL Final    Bilirubin, total 08/29/2017 0.5  0.2 - 1.0 MG/DL Final    ALT (SGPT) 08/29/2017 34  13 - 56 U/L Final    AST (SGOT) 08/29/2017 55* 15 - 37 U/L Final    Alk.  phosphatase 08/29/2017 243* 45 - 117 U/L Final    Protein, total 08/29/2017 7.4  6.4 - 8.2 g/dL Final    Albumin 08/29/2017 3.4  3.4 - 5.0 g/dL Final    Globulin 08/29/2017 4.0  2.0 - 4.0 g/dL Final    A-G Ratio 08/29/2017 0.9  0.8 - 1.7   Final    TSH 08/29/2017 2.73  0.36 - 3.74 uIU/mL Final    LIPID PROFILE 08/29/2017        Final    Cholesterol, total 08/29/2017 269* <200 MG/DL Final    Triglyceride 08/29/2017 116  <150 MG/DL Final    HDL Cholesterol 08/29/2017 129* 40 - 60 MG/DL Final    LDL, calculated 08/29/2017 116.8* 0 - 100 MG/DL Final    VLDL, calculated 08/29/2017 23.2  MG/DL Final    CHOL/HDL Ratio 08/29/2017 2.1  0 - 5.0   Final       Imaging: none    Health Maintenance:  Screening:    Mammogram: negative (10/2016)   PAP smear: s/p PRASHANTH. No further screening. Colorectal: colonoscopy (1/2013) normal. Dr. Candace Gaitan. Due 2023.    Depression: none   DM (HbA1c/FPG): FPG 93 (8/2017)   Hepatitis C: negative (3/2014) at [address]: none   DEXA: osteoporosis (3/2017)   Smoking: resumed smoking 1 ppd   Vitamin D: 41.5 (2/2017)   Medicare Wellness: 3/2/2017      Impression:  Patient Active Problem List   Diagnosis Code    Melanoma right scapula C43.9    COPD (chronic obstructive pulmonary disease) ([facility] J44.9    Carotid artery disease ([facility] bilateral moderate I77.9    Celiac artery stenosis, status post stent I77.4    Severe protein-calorie malnutrition ([facility] E43    Squamous cell carcinoma of oropharynx, with PEG tube and tracheostomy C10.9    Anemia D64.9    Esophageal stricture K22.2    Vitamin D deficiency E55.9    Acquired hypothyroidism E03.9    Hyponatremia E87.1    Carpal tunnel syndrome, left G56.02    GI bleed K92.2    Dysphagia, 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    S/P percutaneous endoscopic gastrostomy (PEG) tube placement (HCC) Z93.1    History of radiation to head and neck region Z92.3    Current smoker F17.200    Essential hypertension I10    Multiple pulmonary nodules R91.8    Closed avulsion fracture of greater trochanter of femur (HCC) S72.113A    Seizures (HCC) R56.9    Age-related osteoporosis with current pathological fracture M80.00XA       Plan:  1. Seizures. Unclear but concerning as presented with multiple witnessed episodes in ED thought to represent status epilepticus requiring loading with Keppra to terminate. Has had no recurrence of seizures since Keppra initiated. Head CT scan was negative, but she has been unable to have brain MRI due to anxiety. Evaluated by Dr. Katherine Sellers, and repeat EEG sleep deprived normal. Continues on Keprra. 2. S/P nondisplaced fracture of right femur greater trochanter. Doing well with increasing weight bearing and ambulation. Follow. 3. Oropharyngeal carcinoma s/p XRT/chemo. Currently in remission. Recent PET scan and neck/chest CT scans with stable pulmonary nodules, and otherwise without clinical evidence of active disease. She is being followed closely by Dr. Jose E García at [facility] and Dr. Kerri Henry. 4. Esophageal stricture. Currently receiving all nutrition via G-tube. On intermittent feeds. Weight decreased five pounds since hospitalization. Severe protein calorie malnutrition. Receiving [employer] protein supplement. Attempting to introduce pureed food via G-tube. Nutritionist advising as per Dr. Serjio Baltazar office   5. Osteoporosis. Significant progression of disease since prior exam in 2010, particularly in right femoral neck. Now with fracture of right proximal femur following mechanical fall. Unclear if safe to treat with biphosphonates given prior head and neck irradiation. Concern for possible increase risk of osteonecrosis with biphosphonate or denosumab use. Has upcoming appointment with Dr. Zeeshan Gómez for evaluation and recommendations regarding best course of therapy. 6. Elevated LFT's. New elevation of AST and alkaline phosphatase level significantly increased.  Review of chest CT scan in 1/2017 and 7/2017 showed hepatic steatosis and mildly dilated central biliary duct without intrahepatic ductal dilatation (but not completely visualized). Mindi Brannon has been reported to cause increase in LFT's. Will repeat and check hepatitis panel, GGT, ferritin, iron studies, ceruloplasmin, AMA, and ASMA. If elevation confirmed, will order hepatic ultrasound. Follow. 7. Iron deficiency anemia. Improved. Previously was secondary to g-i loss. No longer taking iron supplement. Follow closely. 8. Hypothyroidism. TSH with evidence of adequate replacement on current Synthroid dose. Continue to follow. 9. H/O mesenteric ischemia. 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 Being followed by Dr. Jon Olivera. Continue to follow. 10. Carotid stenoses, bilateral. Prior duplex scan revealing severe stenoses bilaterally, but angiogram in 7/2016 by Dr. Jon Olivera did not confirm this. Difficulty with duplex scan most likely due to prior neck irradiation. However, most recent duplex in 6/2017 showing only moderate stenosis. Follow. 11. COPD. Currently well controlled. On Flovent Diskus but reports not needing to use duoneb nebulizers. Followed by Dr. Nyla Valenzuela. Continue to follow. 12. Hyponatremia. Intermittent, but improved during hospitalization. Most likely related to solute concentration of tube feeds +/- excess free water. 13. Carpal tunnel syndrome/ headaches. Being followed by Dr. Yobany Mcnally. On gabapentin and using a left arm splint. Follow. 14. Multiple pulmonary nodules. Stable on most recent CT scan in 1/2017. Being followed by Dr. Shreyas Triplett. 15. Smoking cessation. Discussed at length with the patient, including benefits of improved lung function as well as decreased risk of cardiovascular disease and cancer. Medication and non-pharmacologic options explored.  states that he will stop with her if she is willing. Encouraged to do so. However, she appears reluctant to quantify the amount of her smoking, and is vague about whether she will attempt to stop. Total time spent on topic 5 minutes.   16. Health maintenance. Will give influenza vaccine today. Received Pneumovax. Other immunizations up to date. Mammogram due in 10/2017. Patient wishing to have it done again by [address] [place] [address]. Vitamin D level has been normal. To continue maintenance dose supplement. Colorectal cancer screening normal in 1/2013. Follow. Handicapped parking DMV form completed and excuse letter for jury duty written. Total time: 40 minutes spent with the patient in face-to-face consultation of which greater than 50% was spent on counseling, answering questions and/or coordination of care. Complex medical review and management performed. Patient understands recommendations and agrees with plan. Follow-up in 3 months.

## 2017-09-08 NOTE — TELEPHONE ENCOUNTER
Pt. Returning call from . Pt advised of blood test results. Pt would like a number as to who she should call to schedule the ultrasound for her liver.  Pls advise

## 2017-09-08 NOTE — TELEPHONE ENCOUNTER
Patient reached and informed the order has been placed per Dr Jazmine Phelps and she was given our Central Scheduling contact number [phone], patient states she understands to call and schedule the test to be performed.

## 2017-09-12 DIAGNOSIS — Z93.1 S/P PERCUTANEOUS ENDOSCOPIC GASTROSTOMY (PEG) TUBE PLACEMENT (HCC): ICD-10-CM

## 2017-09-12 RX ORDER — CYCLOBENZAPRINE HCL 5 MG
5 TABLET ORAL
Qty: 25 TAB | Refills: 0 | Status: SHIPPED | OUTPATIENT
Start: 2017-09-12 | End: 2017-10-05 | Stop reason: SDUPTHER

## 2017-09-13 ENCOUNTER — HOSPITAL ENCOUNTER (OUTPATIENT)
Dept: ULTRASOUND IMAGING | Age: 62
Discharge: HOME | End: 2017-09-13
Attending: INTERNAL MEDICINE
Payer: MEDICARE

## 2017-09-13 DIAGNOSIS — R79.89 ELEVATED LIVER FUNCTION TESTS: ICD-10-CM

## 2017-09-13 PROCEDURE — 76705 ECHO EXAM OF ABDOMEN: CPT

## 2017-09-14 ENCOUNTER — TELEPHONE (OUTPATIENT)
Dept: INTERNAL MEDICINE CLINIC | Age: 62
End: 2017-09-14

## 2017-09-14 NOTE — TELEPHONE ENCOUNTER
Aparna fitzpatrick from [employer] says she is returning call from Dr Laure Rivera.  Please call when available [phone]

## 2017-09-18 ENCOUNTER — TELEPHONE (OUTPATIENT)
Dept: INTERNAL MEDICINE CLINIC | Age: 62
End: 2017-09-18

## 2017-09-18 NOTE — TELEPHONE ENCOUNTER
Called and spoke with patient regarding hepatic ultrasound showing Increased hepatic echotexture suggestive of nonspecific hepatocellular pathology such as steatosis. Also informed that I contacted Dr. Manish Lane and it was his opinion that her rise in LFT's would not be related to [address]. Discussed next step. Agreeable to have reassessment of liver function tests in 4 weeks. If remains elevated or worsens, will refer to GI for evaluation. Please schedule patient for lab appointment for first week in October. Patient wishes that office staff only speak to her directly to schedule appointment as does not wish to worry her .

## 2017-09-18 NOTE — TELEPHONE ENCOUNTER
Pt calling asking for US results. Says she needs them to stay confidential so she wants Dr. Chasity Ghotra to call her directly.

## 2017-10-04 ENCOUNTER — HOSPITAL ENCOUNTER (OUTPATIENT)
Dept: LAB | Age: 62
Discharge: HOME | End: 2017-10-04
Payer: MEDICARE

## 2017-10-04 DIAGNOSIS — R74.01 NONSPECIFIC ELEVATION OF LEVELS OF TRANSAMINASE AND LACTIC ACID DEHYDROGENASE (LDH): ICD-10-CM

## 2017-10-04 DIAGNOSIS — R79.89 ELEVATED LFTS: ICD-10-CM

## 2017-10-04 DIAGNOSIS — R74.02 NONSPECIFIC ELEVATION OF LEVELS OF TRANSAMINASE AND LACTIC ACID DEHYDROGENASE (LDH): ICD-10-CM

## 2017-10-04 LAB
ALBUMIN SERPL-MCNC: 3.6 G/DL (ref 3.4–5)
ALBUMIN/GLOB SERPL: 0.9 {RATIO} (ref 0.8–1.7)
ALP SERPL-CCNC: 158 U/L (ref 45–117)
ALT SERPL-CCNC: 22 U/L (ref 13–56)
ANION GAP SERPL CALC-SCNC: 13 MMOL/L (ref 3–18)
AST SERPL-CCNC: 39 U/L (ref 15–37)
BILIRUB SERPL-MCNC: 0.6 MG/DL (ref 0.2–1)
BUN SERPL-MCNC: 16 MG/DL (ref 7–18)
BUN/CREAT SERPL: 21 (ref 12–20)
CALCIUM SERPL-MCNC: 9.9 MG/DL (ref 8.5–10.1)
CHLORIDE SERPL-SCNC: 98 MMOL/L (ref 100–108)
CO2 SERPL-SCNC: 24 MMOL/L (ref 21–32)
CREAT SERPL-MCNC: 0.78 MG/DL (ref 0.6–1.3)
GLOBULIN SER CALC-MCNC: 3.8 G/DL (ref 2–4)
GLUCOSE SERPL-MCNC: 75 MG/DL (ref 74–99)
POTASSIUM SERPL-SCNC: 4.3 MMOL/L (ref 3.5–5.5)
PROT SERPL-MCNC: 7.4 G/DL (ref 6.4–8.2)
SODIUM SERPL-SCNC: 135 MMOL/L (ref 136–145)

## 2017-10-04 PROCEDURE — 82977 ASSAY OF GGT: CPT | Performed by: INTERNAL MEDICINE

## 2017-10-04 PROCEDURE — 36415 COLL VENOUS BLD VENIPUNCTURE: CPT | Performed by: INTERNAL MEDICINE

## 2017-10-04 PROCEDURE — 83516 IMMUNOASSAY NONANTIBODY: CPT | Performed by: INTERNAL MEDICINE

## 2017-10-04 PROCEDURE — 82784 ASSAY IGA/IGD/IGG/IGM EACH: CPT | Performed by: INTERNAL MEDICINE

## 2017-10-04 PROCEDURE — 86038 ANTINUCLEAR ANTIBODIES: CPT | Performed by: INTERNAL MEDICINE

## 2017-10-04 PROCEDURE — 80053 COMPREHEN METABOLIC PANEL: CPT | Performed by: INTERNAL MEDICINE

## 2017-10-05 DIAGNOSIS — Z93.1 S/P PERCUTANEOUS ENDOSCOPIC GASTROSTOMY (PEG) TUBE PLACEMENT (HCC): ICD-10-CM

## 2017-10-05 LAB
ACTIN IGG SERPL-ACNC: 16 UNITS (ref 0–19)
ANA SER QL: NEGATIVE
GGT SERPL-CCNC: 163 IU/L (ref 0–60)
MITOCHONDRIA M2 IGG SER-ACNC: 4 UNITS (ref 0–20)
SEE BELOW:, 164879: NORMAL

## 2017-10-05 RX ORDER — CYCLOBENZAPRINE HCL 5 MG
5 TABLET ORAL
Qty: 25 TAB | Refills: 0 | Status: SHIPPED | OUTPATIENT
Start: 2017-10-05 | End: 2017-11-01 | Stop reason: SDUPTHER

## 2017-10-09 ENCOUNTER — TELEPHONE (OUTPATIENT)
Dept: INTERNAL MEDICINE CLINIC | Age: 62
End: 2017-10-09

## 2017-10-09 LAB
GLIADIN PEPTIDE IGA SER-ACNC: 13 UNITS (ref 0–19)
GLIADIN PEPTIDE IGG SER-ACNC: 2 UNITS (ref 0–19)
IGA SERPL-MCNC: 343 MG/DL (ref 87–352)
TTG IGA SER-ACNC: <2 U/ML (ref 0–3)
TTG IGG SER-ACNC: <2 U/ML (ref 0–5)

## 2017-10-10 DIAGNOSIS — Z93.1 S/P PERCUTANEOUS ENDOSCOPIC GASTROSTOMY (PEG) TUBE PLACEMENT (HCC): ICD-10-CM

## 2017-10-10 RX ORDER — MORPHINE SULFATE ORAL SOLUTION 10 MG/5ML
SOLUTION ORAL
Qty: 100 ML | Refills: 0 | Status: SHIPPED | OUTPATIENT
Start: 2017-10-10 | End: 2017-11-29 | Stop reason: SDUPTHER

## 2017-10-10 NOTE — TELEPHONE ENCOUNTER
Last Visit: 2017 with MD Bosie Aase    Next Appointment: 2017 with MD Bosie Aase   Previous Refill Encounters: 2017 per MD Martinez 100 mL    Requested Prescriptions     Pending Prescriptions Disp Refills    morphine 10 mg/5 mL oral solution 100 mL 0     Si.5ml or 5ml by peg tube every 8 hours as needed for pain

## 2017-10-10 NOTE — TELEPHONE ENCOUNTER
Please let the patient know that her repeat labs of her liver showed significant improvement. Would recommend no further evaluation for now and will recheck them at her next visit to confirm that they are continuing to improve. She has an appointment scheduled for 12/7/2017. Please ask her to have the repeat labs drawn prior to her visit. Lab orders are pending from 9/8/2017. Thanks.

## 2017-10-10 NOTE — TELEPHONE ENCOUNTER
Spoke with pt's , pt rep, given results message below and verbalized understanding. Confirmed appt with him in December. He is aware that she will need labs a week prior, said he will call back to schedule the lab appt.

## 2017-10-10 NOTE — TELEPHONE ENCOUNTER
Reviewed report generated by the [place]. Does not demonstrate aberrancies or inconsistencies with regard to the prescribing of controlled medications to this patient by other providers. Last filled Morphine 8/10/2017 per .

## 2017-10-31 RX ORDER — LEVOTHYROXINE SODIUM 75 UG/1
37.5 TABLET ORAL
Qty: 45 TAB | Refills: 2 | Status: SHIPPED | OUTPATIENT
Start: 2017-10-31 | End: 2018-01-01 | Stop reason: SDUPTHER

## 2017-10-31 NOTE — TELEPHONE ENCOUNTER
Please let patient know that changed levothyroxine prescription. She is still taking 37.5 mcg, but rather than taking one and one half of a 25 mcg tab, changed to half of a 75 mcg tab. Want to make sure she is aware of change in tablet size so as not to take too much. Thanks.

## 2017-11-01 DIAGNOSIS — Z93.1 S/P PERCUTANEOUS ENDOSCOPIC GASTROSTOMY (PEG) TUBE PLACEMENT (HCC): ICD-10-CM

## 2017-11-01 RX ORDER — CYCLOBENZAPRINE HCL 5 MG
5 TABLET ORAL
Qty: 25 TAB | Refills: 0 | Status: SHIPPED | OUTPATIENT
Start: 2017-11-01 | End: 2017-11-24 | Stop reason: SDUPTHER

## 2017-11-01 NOTE — TELEPHONE ENCOUNTER
Last Visit: 2017 with MD Howard Johansen    Next Appointment: noted to f/u in 3 months   Previous Refill Encounters: 10/05/2017 per MD Howard Johansen #[id number]     Requested Prescriptions     Pending Prescriptions Disp Refills    cyclobenzaprine (FLEXERIL) 5 mg tablet 25 Tab 0     Si Tab by Per G Tube route three (3) times daily as needed for Muscle Spasm(s).

## 2017-11-06 NOTE — TELEPHONE ENCOUNTER
Patient spouse calling to [facility]t. States wife and the EEG, which has been final resulted, however pt unable to complete MRI due to anxiety. He states that anxiety medication prescribed was given to patient and they tried an open MRI, however pt still unable complete.  Appt is mike for 6/19 Normal

## 2017-11-07 ENCOUNTER — HOSPITAL ENCOUNTER (OUTPATIENT)
Dept: LAB | Age: 62
Discharge: HOME | End: 2017-11-07
Payer: MEDICARE

## 2017-11-07 PROCEDURE — 80177 DRUG SCRN QUAN LEVETIRACETAM: CPT | Performed by: NURSE PRACTITIONER

## 2017-11-07 PROCEDURE — 36415 COLL VENOUS BLD VENIPUNCTURE: CPT | Performed by: NURSE PRACTITIONER

## 2017-11-10 LAB — LEVETIRACETAM SERPL-MCNC: 109.3 UG/ML (ref 10–40)

## 2017-11-15 ENCOUNTER — OFFICE VISIT (OUTPATIENT)
Dept: NEUROLOGY | Age: 62
End: 2017-11-15

## 2017-11-15 VITALS
OXYGEN SATURATION: 97 % | SYSTOLIC BLOOD PRESSURE: 140 MMHG | TEMPERATURE: 96.5 F | HEART RATE: 116 BPM | WEIGHT: 79.6 LBS | RESPIRATION RATE: 12 BRPM | HEIGHT: 62 IN | DIASTOLIC BLOOD PRESSURE: 68 MMHG | BODY MASS INDEX: 14.65 KG/M2

## 2017-11-15 DIAGNOSIS — R56.9 SEIZURES (HCC): Primary | ICD-10-CM

## 2017-11-15 RX ORDER — ZOLEDRONIC ACID 5 MG/100ML
5 INJECTION, SOLUTION INTRAVENOUS ONCE
COMMUNITY
End: 2018-01-01

## 2017-11-15 NOTE — PATIENT INSTRUCTIONS
Maintain current dose of Keppra. If anything comes up, call the office sooner, otherwise we will follow up in 6 months.

## 2017-11-15 NOTE — PROGRESS NOTES
[address], [address], [place], [address]  [address] Dalia [address]. [facility], [address]  Office:  [phone]  Fax: [phone]  Chief Complaint   Patient presents with    Follow-up    Seizure     labs done     This is a 63year old female who presents for follow up. Continues seizure free. Maintained on Keppra 1,000 mg BID via PEG tube. Denies lethargy. Denies occult seizure symptoms. She is following up with Dr. Guaman in December. She discussed previous elevated liver enzymes that have since returned to normal. She attributed this to several weeks a diarrhea. Denies subsequent diarrhea or nausea. Past Medical History:   Diagnosis Date    Age-related osteoporosis with current pathological fracture 5/21/2017    Cigarette smoker     Closed avulsion fracture of greater trochanter of femur ([address] 4/7/2017    COPD (chronic obstructive pulmonary disease) ([address]     Esophageal stricture 2014    s/p XRT for oropharyngeal cancer; requiring GJ tube for nutrition. 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    s/p stent    Vitamin D deficiency        Past Surgical History:   Procedure Laterality Date    ABDOMEN SURGERY PROC UNLISTED      \"ulcer\" surgery    HX ENDOSCOPY  5/9/2014    w/ dilation    HX ENDOSCOPY  5/13/2014    w/ dilation    HX ENDOSCOPY  5/21    with Dilatation    HX ENDOSCOPY  6/4/2014    w/ dilation    HX GI      HX ORTHOPAEDIC      intramedullary chao fixation of the right tiba    HX OTHER SURGICAL      skin cancer removal from right shoulder; PEG TUBE    HX TRACHEOSTOMY         Current Outpatient Prescriptions   Medication Sig Dispense Refill  zoledronic acid (RECLAST) 5 mg/100 mL pgbk 5 mg by IntraVENous route once. Once a year      cyclobenzaprine (FLEXERIL) 5 mg tablet 1 Tab by Per G Tube route three (3) times daily as needed for Muscle Spasm(s). 25 Tab 0    levothyroxine (SYNTHROID) 75 mcg tablet 0.5 Tabs by Per G Tube route Daily (before breakfast). 45 Tab 2    morphine 10 mg/5 mL oral solution 2.5ml or 5ml by peg tube every 8 hours as needed for pain 100 mL 0    gabapentin (NEURONTIN) 300 mg capsule 1 Cap by Per G Tube route three (3) times daily. 90 Cap 3    amino acids-protein hydrolys (PROSOURCE NO CARB) 15-60 gram-kcal/30 mL lipk Take 30 mL by mouth daily.  levETIRAcetam (KEPPRA) 100 mg/ml soln oral solution 10 mL by Per G Tube route two (2) times a day. 600 mL 5    lactobacillus rhamnosus gg 10 billion cell (CULTURELLE) 10 billion cell capsule 1 Cap by Per G Tube route Before breakfast and dinner. 60 Cap 0    melatonin 3 mg tablet 1 Tab by Per G Tube route nightly as needed. 30 Tab 0    albuterol (PROVENTIL VENTOLIN) 2.5 mg /3 mL (0.083 %) nebulizer solution 2.5 mg by Nebulization route as needed for Wheezing.  cholecalciferol, vitamin D3, (VITAMIN D3) 2,000 unit tab 1 Tab by PEG Tube route daily.  omeprazole (PRILOSEC) 2.5 mg suDR delayed release suspension 20 mg by Per G Tube route two (2) times a day.  lidocaine HCl (XYLOCAINE) 3 % topical cream Apply  to affected area two (2) times a day. Indications: WITH DESITIN      aspirin 81 mg chewable tablet 81 mg by PEG Tube route daily.  cyanocobalamin (VITAMIN B12) 500 mcg tablet 500 mcg by PEG Tube route daily. Via peg       multivitamin (THERAGRAN) liquid 10 mL by PEG Tube route daily.  potassium chloride (K-DUR, KLOR-CON) 10 mEq tablet 10 mEq by Feeding Tube route daily.           Allergies   Allergen Reactions    Percocet [Oxycodone-Acetaminophen] Itching and Hives    Percodan [Oxycodone-Aspirin] Rash and Itching    Plavix [Clopidogrel] Rash and Hives       Social History   Substance Use Topics    Smoking status: Current Every Day Smoker     Packs/day: 0.50     Types: Cigarettes    Smokeless tobacco: Never Used      Comment: DENIES BUT HEAVY ODOR NOTED ON PT. THEN SPOUSE STATES OCCASSIONAL    Alcohol use 8.4 oz/week     14 Cans of beer, 0 Standard drinks or equivalent per week       Family History   Problem Relation Age of Onset    Heart Disease Mother        Review of Systems  Pertinent positives and negatives as noted otherwise comprehensive review is negative. Examination  Visit Vitals    /68 (BP 1 Location: Left arm, BP Patient Position: Sitting)    Pulse (!) 116    Temp 96.5 °F (35.8 °C) (Temporal)    Resp 12    Ht 5' 2\" (1.575 m)    Wt 36.1 kg (79 lb 9.6 oz)    SpO2 97%    BMI 14.56 kg/m2       Pleasant 63year old female, cachetic. No icterus. Oropharynx clear. Supple neck without bruit. Tachycardic. No edema. Neurologically, she is awake, alert, and oriented with normal speech and language. Her cognition is normal.  She is able to discuss her medical history. She is able to discuss her medications. She is able to discuss current events. Intact cranial nerves 2-12. No nystagmus. Visual fields full to confrontation. She has no abnormal movement. She has no pronation or drift. She generates full strength in the upper and lower extremities. Reflexes are symmetrical in the upper and lower extremities bilaterally. Her toes are down bilaterally. Finger nose finger and rapid alternating movements are normal.  Steady gait. Impression/Plan  Katy Tobias is a 63 y.o. female whose history and physical are consistent with seizures. Patient denies subsequent seizure activity. Taking Keppra 1,000 mg BID. Tolerating well. Examination normal and patient does not appear toxic. Denies excessive somnolence. Level 109. We will maintain current dose due to tolerability and maintaining seizure free.  Discussed this plan.  Patient to follow up in 6 months or sooner as needed. Call office with any concerns. Patient and  verbalize understanding. Diagnoses and all orders for this visit:    1. Seizures ([address]     Total time: 15 min   Counseling / coordination time: 12 min   > 50% counseling / coordination?: Yes re: symptoms, management, medications, safety, answering questions, and follow up. Signed By: Meenakshi Garcia NP    This note will not be viewable in [address]

## 2017-11-15 NOTE — MR AVS SNAPSHOT
Visit Information Date & Time Provider Department Dept. Phone Encounter #  
 11/15/2017 10:15 AM Ron Field NP [facility] [phone] [id number] Follow-up Instructions Return in about 6 months (around 5/15/2018). Your Appointments 12/7/2017 10:30 AM  
Office Visit with Debra Galicia MD  
[facility]) Appt Note: 3 month follow up  
 [address] [facility], [address] [address] [address] [place]  
  
   
 [address] [facility], [facility] [address]  
  
    
 1/4/2018  9:00 AM  
PROCEDURE with [facility] IMAG[facility] 1 [facility] ([facility]) Appt Note: celiac 6mo zoran [address] [facility] [address] [facility] [address]  
[phone] [address] [facility]  
  
    
 1/4/2018 10:00 AM  
PROCEDURE with [facility] IMAGING 1 [facility] ([facility]) Appt Note: cv 6mo zoran [address] [facility] [address] [facility] [address]  
[phone] 1/16/2018 10:45 AM  
Office Visit with Jas Daily MD  
[address] [facility]) Appt Note: 6mo ck with test on 01/04/17 [address] [facility] [address] [facility] [address]  
[phone] [address] [facility] [address] [facility] [address] Upcoming Health Maintenance Date Due ZOSTER VACCINE AGE 60> 10/2/2017 Pneumococcal 19-64 Highest Risk (3 of 3 - PCV13) 10/13/2017 MEDICARE YEARLY EXAM 3/3/2018 PAP AKA CERVICAL CYTOLOGY 11/17/2018 BREAST CANCER SCRN MAMMOGRAM 10/18/2019 COLONOSCOPY 1/10/2023 DTaP/Tdap/Td series (2 - Td) 3/6/2027 Allergies as of 11/15/2017  Review Complete On: 11/15/2017 By: Ron Field NP Severity Noted Reaction Type Reactions Percocet [Oxycodone-acetaminophen]  12/29/2013    Itching, Hives Percodan [Oxycodone-aspirin]  05/21/2014    Rash, Itching Plavix [Clopidogrel]  04/10/2013    Rash, Hives Current Immunizations  Reviewed on 10/13/2016 Name Date Influenza Vaccine (Quad) PF 9/5/2017 10:30 AM, 10/13/2016  3:26 PM, 11/17/2015  3:55 PM  
 Influenza Vaccine PF 11/4/2014, 10/21/2013  1:03 PM  
 Pneumococcal Polysaccharide (PPSV-23) 10/13/2016  3:52 PM  
 Tdap 3/6/2017 Not reviewed this visit Vitals BP Pulse Temp Resp Height(growth percentile) 140/68 (BP 1 Location: Left arm, BP Patient Position: Sitting) (!) 116 96.5 °F (35.8 °C) (Temporal) 12 5' 2\" (1.575 m) Weight(growth percentile) SpO2 BMI OB Status Smoking Status 79 lb 9.6 oz (36.1 kg) 97% 14.56 kg/m2 Postmenopausal Current Every Day Smoker Vitals History BMI and BSA Data Body Mass Index Body Surface Area 14.56 kg/m 2 1.26 m 2 Preferred Pharmacy Pharmacy Name Phone [address] [place] [address], [address] [place] [address] [phone] Your Updated Medication List  
  
   
This list is accurate as of: 11/15/17 10:30 AM.  Always use your most recent med list.  
  
  
  
  
 albuterol 2.5 mg /3 mL (0.083 %) nebulizer solution Commonly known as:  PROVENTIL VENTOLIN  
2.5 mg by Nebulization route as needed for Wheezing. aspirin 81 mg chewable tablet 81 mg by PEG Tube route daily. cyanocobalamin 500 mcg tablet Commonly known as:  VITAMIN B12  
500 mcg by PEG Tube route daily. Via peg  
  
 cyclobenzaprine 5 mg tablet Commonly known as:  FLEXERIL  
1 Tab by Per G Tube route three (3) times daily as needed for Muscle Spasm(s). gabapentin 300 mg capsule Commonly known as:  NEURONTIN  
1 Cap by Per G Tube route three (3) times daily. lactobacillus rhamnosus gg 10 billion cell 10 billion cell capsule Commonly known as:  CULTURELLE  
1 Cap by Per G Tube route Before breakfast and dinner. levETIRAcetam 100 mg/ml Soln oral solution Commonly known as:  KEPPRA 10 mL by Per G Tube route two (2) times a day. levothyroxine 75 mcg tablet Commonly known as:  SYNTHROID  
0.5 Tabs by Per G Tube route Daily (before breakfast). lidocaine HCl 3 % topical cream  
Commonly known as:  XYLOCAINE Apply  to affected area two (2) times a day. Indications: WITH DESITIN  
  
 melatonin 3 mg tablet 1 Tab by Per G Tube route nightly as needed. morphine 10 mg/5 mL oral solution 2.5ml or 5ml by peg tube every 8 hours as needed for pain  
  
 multivitamin liquid Commonly known as:  THERAGRAN  
10 mL by PEG Tube route daily. omeprazole 2.5 mg Sudr delayed release suspension Commonly known as:  PRILOSEC  
20 mg by Per G Tube route two (2) times a day. potassium chloride 10 mEq tablet Commonly known as:  KLOR-CON 10 mEq by Feeding Tube route daily. PROSOURCE NO CARB 15-60 gram-kcal/30 mL Lipk Generic drug:  amino acids-protein hydrolys Take 30 mL by mouth daily. VITAMIN D3 2,000 unit Tab Generic drug:  cholecalciferol (vitamin D3) 1 Tab by PEG Tube route daily. zoledronic acid 5 mg/100 mL Pgbk Commonly known as:  RECLAST 5 mg by IntraVENous route once. Once a year Follow-up Instructions Return in about 6 months (around 5/15/2018). Patient Instructions Maintain current dose of Keppra. If anything comes up, call the office sooner, otherwise we will follow up in 6 months. Please provide this summary of care documentation to your next provider. Your primary care clinician is listed as Randa Neumann. If you have any questions after today's visit, please call [phone].

## 2017-11-15 NOTE — PROGRESS NOTES
1. Have you been to the ER, urgent care clinic since your last visit? Hospitalized since your last visit? NO    2. Have you seen or consulted any other health care providers outside of the [address] [place] [address] since your last visit? Include any pap smears or colon screening.     Has had a mammogram and G tube replaced, follow- up with ENT    Chief Complaint   Patient presents with    Follow-up    Seizure     labs done

## 2017-11-24 DIAGNOSIS — Z93.1 S/P PERCUTANEOUS ENDOSCOPIC GASTROSTOMY (PEG) TUBE PLACEMENT (HCC): ICD-10-CM

## 2017-11-24 RX ORDER — CYCLOBENZAPRINE HCL 5 MG
5 TABLET ORAL
Qty: 25 TAB | Refills: 1 | Status: SHIPPED | OUTPATIENT
Start: 2017-11-24 | End: 2017-01-01 | Stop reason: SDUPTHER

## 2017-11-24 NOTE — TELEPHONE ENCOUNTER
Last Visit: 2017 with MD Ramírez Valencia    Next Appointment: 2017 with MD Ramírez Valencia   Previous Refill Encounters: 2017 per MD Ramírez Valencia #25     Requested Prescriptions     Pending Prescriptions Disp Refills    cyclobenzaprine (FLEXERIL) 5 mg tablet 25 Tab 1     Si Tab by Per G Tube route three (3) times daily as needed for Muscle Spasm(s).

## 2017-11-29 DIAGNOSIS — Z93.1 S/P PERCUTANEOUS ENDOSCOPIC GASTROSTOMY (PEG) TUBE PLACEMENT (HCC): ICD-10-CM

## 2017-11-29 RX ORDER — MORPHINE SULFATE ORAL SOLUTION 10 MG/5ML
SOLUTION ORAL
Qty: 100 ML | Refills: 0 | Status: SHIPPED | OUTPATIENT
Start: 2017-01-01 | End: 2017-01-01 | Stop reason: SDUPTHER

## 2017-11-29 NOTE — TELEPHONE ENCOUNTER
[facility] date for Morph[facility] as 10/11/2017. There appears to be no inconsistencies in regards to the prescribing of this medication.      Last Visit: 2017 with MD Rui Bonner    Next Appointment: 2017 with MD Rui Bonner   Previous Refill Encounters: 10/10/2017 per MD Martinez 100 mL    Requested Prescriptions     Pending Prescriptions Disp Refills    morphine 10 mg/5 mL oral solution 100 mL 0     Si.5ml or 5ml by peg tube every 8 hours as needed for pain

## 2017-11-30 NOTE — TELEPHONE ENCOUNTER
Reviewed . Based on refill history, has been filling every 8 weeks. Last refill 10/11/2017. If needs sooner, please contact pharmacy and see if will allow verbal change to order. Otherwise, will need a new script written. Will have controlled substance agreement signed at next visit on 12/7/2017.

## 2017-11-30 NOTE — TELEPHONE ENCOUNTER
Finally spoke with [facility], (when I tried to call back the phone was off the hook for 20 minutes or so)   They took a verbal to change the dispense date to today.  They will get it ready for her and call her when its ready, Pt  aware

## 2017-11-30 NOTE — TELEPHONE ENCOUNTER
Tried to call [facility] to ask if they can fill now, was on hold for 13 minutes.  Will have to call back since I have a patient here

## 2017-12-12 PROBLEM — S93.304A: Status: ACTIVE | Noted: 2017-01-01

## 2017-12-15 NOTE — PROGRESS NOTES
Transition of care coordination/Procedure  Patient admitted to [facility] for a scheduled procedure on 12/12/17 to 12/14/17 for ORIF great toe, right foot. Indication: closed distal dislocation of great toe metatarsocuneiform joint    Contacted patient for transition of care post procedure. Spoke to [employer], . Verified PHI. Introduced self, role and reason for call.  reports:  Taking morphine for pain  Has cast to right foot/leg  Some tingling in toes  Non weight bearing  Seen by [facility] (PT and SN)  Loose BM yesterday     denies:  N/V  Coolness  Numbness  Increased swelling  Warmth  SOB  CP    ADL's:  Feeds self: peg tube  Ambulates: non wt bearing   Self grooming: with assist  Toileting: with assist    DME:   3 in 1 BSC, WC, RW    Support:   , [facility]    AMD:   Not on file     Reconciled medications and reviewed allergies. Educated patient to monitor and report the following Red flags: Redness, increased swelling, warmth at site(s), persistent numbness/tingling, bleeding or pus-like drainage, chills, fever (> 100.5), nausea/vomiting that prevents eating/drinking/taking medications, SOB, chest pain/pressure or any new or concerning symptoms.  verbalizes understanding of information discussed and is aware of  when to seek medical attention from surgeon, PCP or ED. Opportunity to ask questions was provided.  has contact information for future reference or further questions. Appointments:  Juventino Jackson ([id number]) on 12/28/17  Dr. Ezekiel Chen (surgeon) 12/18/17 and 1/3   aware of appointments and will provide transportation to appointments. Potential barriers to care  No apparent barriers to care identified at this time. Goals/Plan of care:  Goals Addressed             Most Recent       Post Hospitalization     Attends follow-up appointments as directed. On track (12/15/2017)             Plan: Review upcoming appointment with PCP.          Understands red flags post discharge. On track (12/15/2017)             Plan: Educate on red flags to monitor and report. This represents Transitions of Care because Nurse Navigator spoke with patient within 1-2 business day(s) of discharge. Transition of care follow up appointment scheduled with  on 12/28/17 at 10:30 AM which is within 7-14 days of discharge.

## 2017-12-18 NOTE — TELEPHONE ENCOUNTER
Last Visit: 2017 with MD Diane Samuel    Next Appointment: 2017 with MD Diane Samuel; Pt canceled   Previous Refill Encounters: 2017 per MD Diane Samuel #25 with 1 refill     Requested Prescriptions     Pending Prescriptions Disp Refills    cyclobenzaprine (FLEXERIL) 5 mg tablet 25 Tab 1     Si Tab by Per G Tube route three (3) times daily as needed for Muscle Spasm(s).

## 2017-12-21 NOTE — TELEPHONE ENCOUNTER
Refill req for morphine 10mgs,pt's   said she broke her foot 2 weeks ago and has been using more than usual,  is req refill asap, [employer]

## 2017-12-21 NOTE — TELEPHONE ENCOUNTER
Last OV: 09/05/2017  Last Fill: 11/30/2017    Reviewed report generated by the [address] [place] [address]. Does not demonstrate aberrancies or inconsistencies with regard to the prescribing of controlled medications to this patient by other providers.

## 2017-12-28 NOTE — TELEPHONE ENCOUNTER
cancelled appt today with Dr Bosie Aase- said PT had a fever and did not feel well and he did not want to take her out in the cold.  I told him the next available appt wasn't until  2/14/18 and he said that was fine so I cancelled and rescheduled

## 2017-12-28 NOTE — PROGRESS NOTES
returned call.  reports:  Temp 99.6  Chills  BP on 12/26 110/68  Released from home health  Still has PT with [facility] twice a week  Itching inside of cast  Right foot occassionally throbs  Able to wiggle toes  Good circulation/color  Disc at base of PEG tube has a crack in it; pinching skin  PEG tube patent     denies:  N/V  Headache  SOB  CP/pressure  Numbness  Tingling  Coolness     reports patient had temp of 99.6 this AM and asked  to cancel appointment because she did not want to go out in the cold weather. Advised  patient should come in to be seen.  verbalizes she told be cancel so that's what I did. Also reports chills.  also reports disc at base of PEG tube is cracked and pinches patient's skin.  verbalizes he has applied Desitin to skin and places a split dressing between tube disc and skin.  verbalizes his intent to contact Dr. Raman Braden and report.  reports patient verbalizes inside of cast itches. Advised  not to place objects down inside cast or allow patient to do so as this may cause injury Reconciled meds. Educated  on red flags to monitor for and report: Redness, warmth, swelling, decreased circulation, persistent numbness/tingling, coolness, bleeding or pus-like drainage, chills, fever (> 100.5), nausea/vomiting that prevents eating/drinking/taking medications, SOB, chest pain/pressure or any other new or concerning s/s.  voices understanding of information discussed. Reviewed upcoming appts with vascular and PCP. Informed patient is scheduled for labs on 1/30/18 at 9:30 AM.     Appointments:  Duplex of abd and bilateral carotid on 1/11/18    Dr. Anne Pratt, vascular 1/16/18 at 10:45 AM  Labs 1/30/18 at 9:30 AM  PCP 2/14/17 at 11 AM

## 2017-12-28 NOTE — PROGRESS NOTES
Was was notified by DANETTE Velasco that patient's  called to cancel transition of care appointment scheduled for today with Dr. Rika Sellers. Kendy Rodriguez verbalized  communicated that patient is running a little temperature and he did not want to bring her out in the cold. Lilia verbalized she informed  the next available appointment with Emma Hunter would not be until February. PCP appt scheduled for 2/14/17; labs scheduled for the week prior. Attempted to reach patient. No answer. Left message introducing self, role and reason for call. Requested return call. Contact information provided. Will attempt to contact at a later time.

## 2018-01-01 ENCOUNTER — OFFICE VISIT (OUTPATIENT)
Dept: INTERNAL MEDICINE CLINIC | Age: 63
End: 2018-01-01

## 2018-01-01 ENCOUNTER — PATIENT OUTREACH (OUTPATIENT)
Dept: INTERNAL MEDICINE CLINIC | Age: 63
End: 2018-01-01

## 2018-01-01 ENCOUNTER — TELEPHONE (OUTPATIENT)
Dept: INTERNAL MEDICINE CLINIC | Age: 63
End: 2018-01-01

## 2018-01-01 ENCOUNTER — OFFICE VISIT (OUTPATIENT)
Dept: NEUROLOGY | Age: 63
End: 2018-01-01

## 2018-01-01 ENCOUNTER — APPOINTMENT (OUTPATIENT)
Dept: INTERNAL MEDICINE CLINIC | Age: 63
End: 2018-01-01

## 2018-01-01 ENCOUNTER — HOSPITAL ENCOUNTER (OUTPATIENT)
Dept: LAB | Age: 63
Discharge: HOME | End: 2018-09-21
Payer: MEDICARE

## 2018-01-01 ENCOUNTER — OFFICE VISIT (OUTPATIENT)
Dept: VASCULAR SURGERY | Age: 63
End: 2018-01-01

## 2018-01-01 ENCOUNTER — HOSPITAL ENCOUNTER (OUTPATIENT)
Dept: LAB | Age: 63
Discharge: HOME | End: 2018-08-15
Payer: MEDICARE

## 2018-01-01 ENCOUNTER — HOSPITAL ENCOUNTER (OUTPATIENT)
Dept: LAB | Age: 63
Discharge: HOME | End: 2018-07-19
Payer: MEDICARE

## 2018-01-01 ENCOUNTER — HOSPITAL ENCOUNTER (OUTPATIENT)
Dept: LAB | Age: 63
Discharge: HOME | End: 2018-08-22
Payer: MEDICARE

## 2018-01-01 ENCOUNTER — HOSPITAL ENCOUNTER (OUTPATIENT)
Dept: LAB | Age: 63
Discharge: HOME | End: 2018-02-27
Payer: MEDICARE

## 2018-01-01 ENCOUNTER — HOSPITAL ENCOUNTER (OUTPATIENT)
Dept: LAB | Age: 63
Discharge: HOME | End: 2018-02-14
Payer: MEDICARE

## 2018-01-01 VITALS
DIASTOLIC BLOOD PRESSURE: 70 MMHG | HEIGHT: 62 IN | TEMPERATURE: 98.6 F | SYSTOLIC BLOOD PRESSURE: 110 MMHG | RESPIRATION RATE: 14 BRPM | OXYGEN SATURATION: 98 % | HEART RATE: 100 BPM

## 2018-01-01 VITALS
HEIGHT: 62 IN | SYSTOLIC BLOOD PRESSURE: 116 MMHG | DIASTOLIC BLOOD PRESSURE: 62 MMHG | TEMPERATURE: 98.1 F | RESPIRATION RATE: 14 BRPM | HEART RATE: 104 BPM

## 2018-01-01 VITALS
HEIGHT: 62 IN | RESPIRATION RATE: 20 BRPM | SYSTOLIC BLOOD PRESSURE: 102 MMHG | HEART RATE: 60 BPM | WEIGHT: 77 LBS | BODY MASS INDEX: 14.17 KG/M2 | DIASTOLIC BLOOD PRESSURE: 60 MMHG

## 2018-01-01 VITALS
BODY MASS INDEX: 13.8 KG/M2 | HEART RATE: 68 BPM | DIASTOLIC BLOOD PRESSURE: 70 MMHG | SYSTOLIC BLOOD PRESSURE: 100 MMHG | HEIGHT: 62 IN | WEIGHT: 75 LBS | RESPIRATION RATE: 17 BRPM

## 2018-01-01 VITALS
SYSTOLIC BLOOD PRESSURE: 130 MMHG | RESPIRATION RATE: 14 BRPM | TEMPERATURE: 97.9 F | OXYGEN SATURATION: 90 % | HEART RATE: 59 BPM | HEIGHT: 62 IN | DIASTOLIC BLOOD PRESSURE: 80 MMHG

## 2018-01-01 VITALS
DIASTOLIC BLOOD PRESSURE: 68 MMHG | TEMPERATURE: 98.9 F | RESPIRATION RATE: 14 BRPM | SYSTOLIC BLOOD PRESSURE: 112 MMHG | HEIGHT: 62 IN | HEART RATE: 96 BPM

## 2018-01-01 VITALS
DIASTOLIC BLOOD PRESSURE: 60 MMHG | SYSTOLIC BLOOD PRESSURE: 100 MMHG | OXYGEN SATURATION: 99 % | HEART RATE: 95 BPM | RESPIRATION RATE: 18 BRPM

## 2018-01-01 VITALS
WEIGHT: 72 LBS | SYSTOLIC BLOOD PRESSURE: 118 MMHG | BODY MASS INDEX: 13.25 KG/M2 | HEIGHT: 62 IN | HEART RATE: 95 BPM | RESPIRATION RATE: 18 BRPM | DIASTOLIC BLOOD PRESSURE: 60 MMHG | TEMPERATURE: 98.5 F

## 2018-01-01 VITALS
HEIGHT: 62 IN | TEMPERATURE: 98.1 F | HEART RATE: 72 BPM | OXYGEN SATURATION: 96 % | DIASTOLIC BLOOD PRESSURE: 72 MMHG | SYSTOLIC BLOOD PRESSURE: 130 MMHG | RESPIRATION RATE: 14 BRPM

## 2018-01-01 DIAGNOSIS — Z09 HOSPITAL DISCHARGE FOLLOW-UP: ICD-10-CM

## 2018-01-01 DIAGNOSIS — F17.200 CURRENT SMOKER: ICD-10-CM

## 2018-01-01 DIAGNOSIS — S72.111S: ICD-10-CM

## 2018-01-01 DIAGNOSIS — D50.9 IRON DEFICIENCY ANEMIA, UNSPECIFIED IRON DEFICIENCY ANEMIA TYPE: ICD-10-CM

## 2018-01-01 DIAGNOSIS — M86.9 BONE INFECTION, ANKLE/FOOT (HCC): ICD-10-CM

## 2018-01-01 DIAGNOSIS — M85.9 DISORDER OF BONE DENSITY AND STRUCTURE, UNSPECIFIED: ICD-10-CM

## 2018-01-01 DIAGNOSIS — R91.8 MULTIPLE PULMONARY NODULES: ICD-10-CM

## 2018-01-01 DIAGNOSIS — M86.9 OSTEOMYELITIS OF RIGHT FOOT, UNSPECIFIED TYPE (HCC): ICD-10-CM

## 2018-01-01 DIAGNOSIS — K92.2 GI BLEED DUE TO NSAIDS: ICD-10-CM

## 2018-01-01 DIAGNOSIS — Z93.1 S/P PERCUTANEOUS ENDOSCOPIC GASTROSTOMY (PEG) TUBE PLACEMENT (HCC): ICD-10-CM

## 2018-01-01 DIAGNOSIS — S93.304S: ICD-10-CM

## 2018-01-01 DIAGNOSIS — T39.395A GI BLEED DUE TO NSAIDS: ICD-10-CM

## 2018-01-01 DIAGNOSIS — M86.9 BONE INFECTION, ANKLE/FOOT (HCC): Primary | ICD-10-CM

## 2018-01-01 DIAGNOSIS — A41.9 SEPSIS, DUE TO UNSPECIFIED ORGANISM: ICD-10-CM

## 2018-01-01 DIAGNOSIS — R13.13 DYSPHAGIA, CRICOPHARYNGEAL: ICD-10-CM

## 2018-01-01 DIAGNOSIS — Z92.3 HISTORY OF RADIATION TO HEAD AND NECK REGION: ICD-10-CM

## 2018-01-01 DIAGNOSIS — R53.81 PHYSICAL DECONDITIONING: ICD-10-CM

## 2018-01-01 DIAGNOSIS — R73.09 ABNORMAL GLUCOSE: ICD-10-CM

## 2018-01-01 DIAGNOSIS — L97.514 NON-PRESSURE CHRONIC ULCER OF OTHER PART OF RIGHT FOOT WITH NECROSIS OF BONE (HCC): ICD-10-CM

## 2018-01-01 DIAGNOSIS — R56.9 SEIZURES (HCC): Primary | ICD-10-CM

## 2018-01-01 DIAGNOSIS — K22.2 ESOPHAGEAL STRICTURE: ICD-10-CM

## 2018-01-01 DIAGNOSIS — E43 SEVERE PROTEIN-CALORIE MALNUTRITION (HCC): ICD-10-CM

## 2018-01-01 DIAGNOSIS — S93.304A FOOT DISLOCATION, RIGHT, INITIAL ENCOUNTER: Primary | ICD-10-CM

## 2018-01-01 DIAGNOSIS — C10.9 SQUAMOUS CELL CARCINOMA OF OROPHARYNX (HCC): ICD-10-CM

## 2018-01-01 DIAGNOSIS — I77.1 CELIAC ARTERY STENOSIS (HCC): ICD-10-CM

## 2018-01-01 DIAGNOSIS — J44.9 CHRONIC OBSTRUCTIVE PULMONARY DISEASE, UNSPECIFIED COPD TYPE (HCC): ICD-10-CM

## 2018-01-01 DIAGNOSIS — M80.00XS AGE-RELATED OSTEOPOROSIS WITH CURRENT PATHOLOGICAL FRACTURE, SEQUELA: ICD-10-CM

## 2018-01-01 DIAGNOSIS — A49.8 CLOSTRIDIUM DIFFICILE INFECTION: ICD-10-CM

## 2018-01-01 DIAGNOSIS — J01.90 ACUTE NON-RECURRENT SINUSITIS, UNSPECIFIED LOCATION: Primary | ICD-10-CM

## 2018-01-01 DIAGNOSIS — E03.9 ACQUIRED HYPOTHYROIDISM: ICD-10-CM

## 2018-01-01 DIAGNOSIS — S72.111S: Primary | ICD-10-CM

## 2018-01-01 DIAGNOSIS — L97.514 NON-PRESSURE CHRONIC ULCER OF OTHER PART OF RIGHT FOOT WITH NECROSIS OF BONE (HCC): Primary | ICD-10-CM

## 2018-01-01 DIAGNOSIS — I77.9 BILATERAL CAROTID ARTERY DISEASE (HCC): ICD-10-CM

## 2018-01-01 DIAGNOSIS — E87.8 ELECTROLYTE DISTURBANCE: ICD-10-CM

## 2018-01-01 DIAGNOSIS — S93.304A FOOT DISLOCATION, RIGHT, INITIAL ENCOUNTER: ICD-10-CM

## 2018-01-01 DIAGNOSIS — I10 ESSENTIAL HYPERTENSION: ICD-10-CM

## 2018-01-01 DIAGNOSIS — I77.9 BILATERAL CAROTID ARTERY DISEASE (HCC): Primary | ICD-10-CM

## 2018-01-01 DIAGNOSIS — M86.9 OSTEOMYELITIS OF RIGHT FOOT, UNSPECIFIED TYPE (HCC): Primary | ICD-10-CM

## 2018-01-01 DIAGNOSIS — Z71.89 ACP (ADVANCE CARE PLANNING): ICD-10-CM

## 2018-01-01 DIAGNOSIS — R56.9 SEIZURES (HCC): ICD-10-CM

## 2018-01-01 DIAGNOSIS — D72.829 LEUKOCYTOSIS, UNSPECIFIED TYPE: ICD-10-CM

## 2018-01-01 DIAGNOSIS — R74.8 ELEVATED ALKALINE PHOSPHATASE LEVEL: ICD-10-CM

## 2018-01-01 DIAGNOSIS — L08.9 WOUND INFECTION: ICD-10-CM

## 2018-01-01 DIAGNOSIS — L08.9 WOUND INFECTION: Primary | ICD-10-CM

## 2018-01-01 DIAGNOSIS — T14.8XXA WOUND INFECTION: ICD-10-CM

## 2018-01-01 DIAGNOSIS — Z23 ENCOUNTER FOR IMMUNIZATION: ICD-10-CM

## 2018-01-01 DIAGNOSIS — T14.8XXA WOUND INFECTION: Primary | ICD-10-CM

## 2018-01-01 DIAGNOSIS — Z09 HOSPITAL DISCHARGE FOLLOW-UP: Primary | ICD-10-CM

## 2018-01-01 DIAGNOSIS — Z98.890 S/P FOOT SURGERY, RIGHT: ICD-10-CM

## 2018-01-01 DIAGNOSIS — Z00.00 MEDICARE ANNUAL WELLNESS VISIT, SUBSEQUENT: ICD-10-CM

## 2018-01-01 DIAGNOSIS — S93.304S: Primary | ICD-10-CM

## 2018-01-01 LAB
25(OH)D3 SERPL-MCNC: 33.3 NG/ML (ref 30–100)
25(OH)D3 SERPL-MCNC: 46.2 NG/ML (ref 30–100)
ALBUMIN SERPL-MCNC: 2.2 G/DL (ref 3.4–5)
ALBUMIN SERPL-MCNC: 2.4 G/DL (ref 3.4–5)
ALBUMIN SERPL-MCNC: 2.5 G/DL (ref 3.4–5)
ALBUMIN/GLOB SERPL: 0.5 {RATIO} (ref 0.8–1.7)
ALP SERPL-CCNC: 319 U/L (ref 45–117)
ALP SERPL-CCNC: 366 U/L (ref 45–117)
ALP SERPL-CCNC: 403 U/L (ref 45–117)
ALT SERPL-CCNC: 13 U/L (ref 13–56)
ALT SERPL-CCNC: 17 U/L (ref 13–56)
ALT SERPL-CCNC: 20 U/L (ref 13–56)
ANION GAP SERPL CALC-SCNC: 10 MMOL/L (ref 3–18)
ANION GAP SERPL CALC-SCNC: 12 MMOL/L (ref 3–18)
ANION GAP SERPL CALC-SCNC: 9 MMOL/L (ref 3–18)
AST SERPL-CCNC: 32 U/L (ref 15–37)
AST SERPL-CCNC: 34 U/L (ref 15–37)
AST SERPL-CCNC: 49 U/L (ref 15–37)
BASOPHILS # BLD: 0.1 K/UL (ref 0–0.06)
BASOPHILS # BLD: 0.1 K/UL (ref 0–0.1)
BASOPHILS # BLD: 0.2 K/UL (ref 0–0.06)
BASOPHILS NFR BLD: 0 % (ref 0–2)
BASOPHILS NFR BLD: 1 % (ref 0–2)
BASOPHILS NFR BLD: 2 % (ref 0–2)
BILIRUB SERPL-MCNC: 0.3 MG/DL (ref 0.2–1)
BILIRUB SERPL-MCNC: 0.6 MG/DL (ref 0.2–1)
BILIRUB SERPL-MCNC: 0.8 MG/DL (ref 0.2–1)
BUN SERPL-MCNC: 10 MG/DL (ref 7–18)
BUN SERPL-MCNC: 11 MG/DL (ref 7–18)
BUN SERPL-MCNC: 6 MG/DL (ref 7–18)
BUN SERPL-MCNC: 6 MG/DL (ref 7–18)
BUN SERPL-MCNC: 7 MG/DL (ref 7–18)
BUN/CREAT SERPL: 10 (ref 12–20)
BUN/CREAT SERPL: 10 (ref 12–20)
BUN/CREAT SERPL: 11 (ref 12–20)
BUN/CREAT SERPL: 17 (ref 12–20)
BUN/CREAT SERPL: 8 (ref 12–20)
CALCIUM SERPL-MCNC: 8.1 MG/DL (ref 8.5–10.1)
CALCIUM SERPL-MCNC: 8.6 MG/DL (ref 8.5–10.1)
CALCIUM SERPL-MCNC: 8.9 MG/DL (ref 8.5–10.1)
CALCIUM SERPL-MCNC: 9.3 MG/DL (ref 8.5–10.1)
CALCIUM SERPL-MCNC: 9.3 MG/DL (ref 8.5–10.1)
CHLORIDE SERPL-SCNC: 101 MMOL/L (ref 100–108)
CHLORIDE SERPL-SCNC: 102 MMOL/L (ref 100–108)
CHLORIDE SERPL-SCNC: 105 MMOL/L (ref 100–108)
CHLORIDE SERPL-SCNC: 105 MMOL/L (ref 100–108)
CHLORIDE SERPL-SCNC: 106 MMOL/L (ref 100–108)
CHOLEST SERPL-MCNC: 186 MG/DL
CO2 SERPL-SCNC: 22 MMOL/L (ref 21–32)
CO2 SERPL-SCNC: 23 MMOL/L (ref 21–32)
CO2 SERPL-SCNC: 23 MMOL/L (ref 21–32)
CO2 SERPL-SCNC: 24 MMOL/L (ref 21–32)
CO2 SERPL-SCNC: 25 MMOL/L (ref 21–32)
CREAT SERPL-MCNC: 0.6 MG/DL (ref 0.6–1.3)
CREAT SERPL-MCNC: 0.63 MG/DL (ref 0.6–1.3)
CREAT SERPL-MCNC: 0.7 MG/DL (ref 0.6–1.3)
CREAT SERPL-MCNC: 0.78 MG/DL (ref 0.6–1.3)
CREAT SERPL-MCNC: 0.9 MG/DL (ref 0.6–1.3)
DIFFERENTIAL METHOD BLD: ABNORMAL
EOSINOPHIL # BLD: 0 K/UL (ref 0–0.4)
EOSINOPHIL # BLD: 0.1 K/UL (ref 0–0.4)
EOSINOPHIL # BLD: 0.1 K/UL (ref 0–0.4)
EOSINOPHIL NFR BLD: 0 % (ref 0–5)
EOSINOPHIL NFR BLD: 1 % (ref 0–5)
EOSINOPHIL NFR BLD: 1 % (ref 0–5)
ERYTHROCYTE [DISTWIDTH] IN BLOOD BY AUTOMATED COUNT: 14.1 % (ref 11.6–14.5)
ERYTHROCYTE [DISTWIDTH] IN BLOOD BY AUTOMATED COUNT: 14.6 % (ref 11.6–14.5)
ERYTHROCYTE [DISTWIDTH] IN BLOOD BY AUTOMATED COUNT: 14.8 % (ref 11.6–14.5)
ERYTHROCYTE [DISTWIDTH] IN BLOOD BY AUTOMATED COUNT: 17.5 % (ref 11.6–14.5)
ERYTHROCYTE [DISTWIDTH] IN BLOOD BY AUTOMATED COUNT: 18.2 % (ref 11.6–14.5)
EST. AVERAGE GLUCOSE BLD GHB EST-MCNC: NORMAL MG/DL
FERRITIN SERPL-MCNC: 183 NG/ML (ref 8–388)
GGT SERPL-CCNC: 251 IU/L (ref 0–60)
GLOBULIN SER CALC-MCNC: 4.3 G/DL (ref 2–4)
GLOBULIN SER CALC-MCNC: 4.8 G/DL (ref 2–4)
GLOBULIN SER CALC-MCNC: 4.9 G/DL (ref 2–4)
GLUCOSE SERPL-MCNC: 167 MG/DL (ref 74–99)
GLUCOSE SERPL-MCNC: 77 MG/DL (ref 74–99)
GLUCOSE SERPL-MCNC: 77 MG/DL (ref 74–99)
GLUCOSE SERPL-MCNC: 84 MG/DL (ref 74–99)
GLUCOSE SERPL-MCNC: 99 MG/DL (ref 74–99)
HBA1C MFR BLD: 4.3 % (ref 4.2–5.6)
HCT VFR BLD AUTO: 29.2 % (ref 35–45)
HCT VFR BLD AUTO: 33.7 % (ref 35–45)
HCT VFR BLD AUTO: 37 % (ref 35–45)
HCT VFR BLD AUTO: 38.6 % (ref 35–45)
HCT VFR BLD AUTO: 39 % (ref 35–45)
HCT VFR BLD AUTO: 39.8 % (ref 35–45)
HDLC SERPL-MCNC: 72 MG/DL (ref 40–60)
HDLC SERPL: 2.6 {RATIO} (ref 0–5)
HGB BLD-MCNC: 11.1 G/DL (ref 12–16)
HGB BLD-MCNC: 11.6 G/DL (ref 12–16)
HGB BLD-MCNC: 12.5 G/DL (ref 12–16)
HGB BLD-MCNC: 12.7 G/DL (ref 12–16)
HGB BLD-MCNC: 12.9 G/DL (ref 12–16)
HGB BLD-MCNC: 9.2 G/DL (ref 12–16)
IRON SATN MFR SERPL: 48 %
IRON SERPL-MCNC: 59 UG/DL (ref 50–175)
LDLC SERPL CALC-MCNC: 77.6 MG/DL (ref 0–100)
LIPID PROFILE,FLP: ABNORMAL
LYMPHOCYTES # BLD: 0.4 K/UL (ref 0.9–3.6)
LYMPHOCYTES # BLD: 0.8 K/UL (ref 0.9–3.6)
LYMPHOCYTES # BLD: 0.8 K/UL (ref 0.9–3.6)
LYMPHOCYTES # BLD: 0.9 K/UL (ref 0.9–3.6)
LYMPHOCYTES # BLD: 1.5 K/UL (ref 0.9–3.6)
LYMPHOCYTES NFR BLD: 4 % (ref 21–52)
LYMPHOCYTES NFR BLD: 6 % (ref 21–52)
LYMPHOCYTES NFR BLD: 6 % (ref 21–52)
LYMPHOCYTES NFR BLD: 7 % (ref 21–52)
LYMPHOCYTES NFR BLD: 8 % (ref 21–52)
MAGNESIUM SERPL-MCNC: 1.7 MG/DL (ref 1.6–2.6)
MCH RBC QN AUTO: 32.5 PG (ref 24–34)
MCH RBC QN AUTO: 32.9 PG (ref 24–34)
MCH RBC QN AUTO: 32.9 PG (ref 24–34)
MCH RBC QN AUTO: 33 PG (ref 24–34)
MCH RBC QN AUTO: 33.2 PG (ref 24–34)
MCHC RBC AUTO-ENTMCNC: 31.5 G/DL (ref 31–37)
MCHC RBC AUTO-ENTMCNC: 32.1 G/DL (ref 31–37)
MCHC RBC AUTO-ENTMCNC: 32.4 G/DL (ref 31–37)
MCHC RBC AUTO-ENTMCNC: 32.9 G/DL (ref 31–37)
MCHC RBC AUTO-ENTMCNC: 32.9 G/DL (ref 31–37)
MCV RBC AUTO: 100 FL (ref 74–97)
MCV RBC AUTO: 101 FL (ref 74–97)
MCV RBC AUTO: 101.8 FL (ref 74–97)
MCV RBC AUTO: 102.6 FL (ref 74–97)
MCV RBC AUTO: 103.2 FL (ref 74–97)
MONOCYTES # BLD: 0.6 K/UL (ref 0.05–1.2)
MONOCYTES # BLD: 0.6 K/UL (ref 0.05–1.2)
MONOCYTES # BLD: 0.7 K/UL (ref 0.05–1.2)
MONOCYTES # BLD: 0.9 K/UL (ref 0.05–1.2)
MONOCYTES # BLD: 1.1 K/UL (ref 0.05–1.2)
MONOCYTES NFR BLD: 10 % (ref 3–10)
MONOCYTES NFR BLD: 2 % (ref 3–10)
MONOCYTES NFR BLD: 4 % (ref 3–10)
MONOCYTES NFR BLD: 7 % (ref 3–10)
MONOCYTES NFR BLD: 9 % (ref 3–10)
NEUTS SEG # BLD: 13.6 K/UL (ref 1.8–8)
NEUTS SEG # BLD: 22 K/UL (ref 1.8–8)
NEUTS SEG # BLD: 8.3 K/UL (ref 1.8–8)
NEUTS SEG # BLD: 8.8 K/UL (ref 1.8–8)
NEUTS SEG # BLD: 9.3 K/UL (ref 1.8–8)
NEUTS SEG NFR BLD: 81 % (ref 40–73)
NEUTS SEG NFR BLD: 84 % (ref 40–73)
NEUTS SEG NFR BLD: 84 % (ref 40–73)
NEUTS SEG NFR BLD: 89 % (ref 40–73)
NEUTS SEG NFR BLD: 92 % (ref 40–73)
PERIPHERAL SMEAR,PSM: NORMAL
PHOSPHATE SERPL-MCNC: 4.4 MG/DL (ref 2.5–4.9)
PLATELET # BLD AUTO: 229 K/UL (ref 135–420)
PLATELET # BLD AUTO: 328 K/UL (ref 135–420)
PLATELET # BLD AUTO: 395 K/UL (ref 135–420)
PLATELET # BLD AUTO: 436 K/UL (ref 135–420)
PLATELET # BLD AUTO: 583 K/UL (ref 135–420)
PMV BLD AUTO: 10.3 FL (ref 9.2–11.8)
PMV BLD AUTO: 10.6 FL (ref 9.2–11.8)
PMV BLD AUTO: 11 FL (ref 9.2–11.8)
PMV BLD AUTO: 11.1 FL (ref 9.2–11.8)
PMV BLD AUTO: 11.4 FL (ref 9.2–11.8)
POTASSIUM SERPL-SCNC: 3.4 MMOL/L (ref 3.5–5.5)
POTASSIUM SERPL-SCNC: 4 MMOL/L (ref 3.5–5.5)
POTASSIUM SERPL-SCNC: 4.2 MMOL/L (ref 3.5–5.5)
POTASSIUM SERPL-SCNC: 4.4 MMOL/L (ref 3.5–5.5)
POTASSIUM SERPL-SCNC: 4.7 MMOL/L (ref 3.5–5.5)
PROT SERPL-MCNC: 6.5 G/DL (ref 6.4–8.2)
PROT SERPL-MCNC: 7.2 G/DL (ref 6.4–8.2)
PROT SERPL-MCNC: 7.4 G/DL (ref 6.4–8.2)
RBC # BLD AUTO: 2.83 M/UL (ref 4.2–5.3)
RBC # BLD AUTO: 3.37 M/UL (ref 4.2–5.3)
RBC # BLD AUTO: 3.8 M/UL (ref 4.2–5.3)
RBC # BLD AUTO: 3.82 M/UL (ref 4.2–5.3)
RBC # BLD AUTO: 3.91 M/UL (ref 4.2–5.3)
SODIUM SERPL-SCNC: 134 MMOL/L (ref 136–145)
SODIUM SERPL-SCNC: 135 MMOL/L (ref 136–145)
SODIUM SERPL-SCNC: 138 MMOL/L (ref 136–145)
SODIUM SERPL-SCNC: 138 MMOL/L (ref 136–145)
SODIUM SERPL-SCNC: 140 MMOL/L (ref 136–145)
TIBC SERPL-MCNC: 123 UG/DL (ref 250–450)
TRIGL SERPL-MCNC: 182 MG/DL (ref ?–150)
TSH SERPL DL<=0.05 MIU/L-ACNC: 1.23 UIU/ML (ref 0.36–3.74)
TSH SERPL DL<=0.05 MIU/L-ACNC: 1.3 UIU/ML (ref 0.36–3.74)
VIT B12 SERPL-MCNC: >2000 PG/ML (ref 211–911)
VLDLC SERPL CALC-MCNC: 36.4 MG/DL
WBC # BLD AUTO: 10.4 K/UL (ref 4.6–13.2)
WBC # BLD AUTO: 10.5 K/UL (ref 4.6–13.2)
WBC # BLD AUTO: 10.9 K/UL (ref 4.6–13.2)
WBC # BLD AUTO: 15.2 K/UL (ref 4.6–13.2)
WBC # BLD AUTO: 24.2 K/UL (ref 4.6–13.2)

## 2018-01-01 PROCEDURE — 82306 VITAMIN D 25 HYDROXY: CPT | Performed by: INTERNAL MEDICINE

## 2018-01-01 PROCEDURE — 83036 HEMOGLOBIN GLYCOSYLATED A1C: CPT | Performed by: INTERNAL MEDICINE

## 2018-01-01 PROCEDURE — 85025 COMPLETE CBC W/AUTO DIFF WBC: CPT | Performed by: INTERNAL MEDICINE

## 2018-01-01 PROCEDURE — 82607 VITAMIN B-12: CPT | Performed by: INTERNAL MEDICINE

## 2018-01-01 PROCEDURE — 82728 ASSAY OF FERRITIN: CPT | Performed by: INTERNAL MEDICINE

## 2018-01-01 PROCEDURE — 80048 BASIC METABOLIC PNL TOTAL CA: CPT | Performed by: INTERNAL MEDICINE

## 2018-01-01 PROCEDURE — 80053 COMPREHEN METABOLIC PANEL: CPT | Performed by: INTERNAL MEDICINE

## 2018-01-01 PROCEDURE — 80061 LIPID PANEL: CPT | Performed by: INTERNAL MEDICINE

## 2018-01-01 PROCEDURE — 83540 ASSAY OF IRON: CPT | Performed by: INTERNAL MEDICINE

## 2018-01-01 PROCEDURE — 84443 ASSAY THYROID STIM HORMONE: CPT | Performed by: INTERNAL MEDICINE

## 2018-01-01 PROCEDURE — 36415 COLL VENOUS BLD VENIPUNCTURE: CPT | Performed by: INTERNAL MEDICINE

## 2018-01-01 PROCEDURE — 82977 ASSAY OF GGT: CPT | Performed by: INTERNAL MEDICINE

## 2018-01-01 PROCEDURE — 83735 ASSAY OF MAGNESIUM: CPT | Performed by: PHYSICIAN ASSISTANT

## 2018-01-01 PROCEDURE — 36415 COLL VENOUS BLD VENIPUNCTURE: CPT | Performed by: PHYSICIAN ASSISTANT

## 2018-01-01 PROCEDURE — 84100 ASSAY OF PHOSPHORUS: CPT | Performed by: PHYSICIAN ASSISTANT

## 2018-01-01 PROCEDURE — 36415 COLL VENOUS BLD VENIPUNCTURE: CPT | Performed by: NURSE PRACTITIONER

## 2018-01-01 PROCEDURE — 85018 HEMOGLOBIN: CPT | Performed by: NURSE PRACTITIONER

## 2018-01-01 PROCEDURE — 85025 COMPLETE CBC W/AUTO DIFF WBC: CPT | Performed by: PHYSICIAN ASSISTANT

## 2018-01-01 PROCEDURE — 80048 BASIC METABOLIC PNL TOTAL CA: CPT | Performed by: PHYSICIAN ASSISTANT

## 2018-01-01 RX ORDER — CYCLOBENZAPRINE HCL 5 MG
5 TABLET ORAL
Qty: 25 TAB | Refills: 1 | Status: SHIPPED | OUTPATIENT
Start: 2018-01-01 | End: 2018-01-01 | Stop reason: SDUPTHER

## 2018-01-01 RX ORDER — CYCLOBENZAPRINE HCL 5 MG
5 TABLET ORAL
Qty: 25 TAB | Refills: 1 | Status: SHIPPED | OUTPATIENT
Start: 2018-01-01

## 2018-01-01 RX ORDER — MORPHINE SULFATE ORAL SOLUTION 10 MG/5ML
SOLUTION ORAL
Qty: 100 ML | Refills: 0 | Status: SHIPPED | OUTPATIENT
Start: 2018-01-01 | End: 2018-01-01 | Stop reason: SDUPTHER

## 2018-01-01 RX ORDER — GABAPENTIN 300 MG/1
300 CAPSULE ORAL 3 TIMES DAILY
Qty: 90 CAP | Refills: 3 | Status: SHIPPED | OUTPATIENT
Start: 2018-01-01

## 2018-01-01 RX ORDER — AMOXICILLIN AND CLAVULANATE POTASSIUM 500; 125 MG/1; MG/1
1 TABLET, FILM COATED ORAL EVERY 12 HOURS
Qty: 20 TAB | Refills: 0 | Status: SHIPPED | OUTPATIENT
Start: 2018-01-01 | End: 2018-01-01

## 2018-01-01 RX ORDER — ALBUTEROL SULFATE 0.83 MG/ML
2.5 SOLUTION RESPIRATORY (INHALATION)
Qty: 24 EACH | Refills: 0 | Status: SHIPPED | OUTPATIENT
Start: 2018-01-01

## 2018-01-01 RX ORDER — LEVETIRACETAM 100 MG/ML
SOLUTION ORAL
Qty: 600 ML | Refills: 5 | Status: SHIPPED | OUTPATIENT
Start: 2018-01-01

## 2018-01-01 RX ORDER — MORPHINE SULFATE ORAL SOLUTION 10 MG/5ML
SOLUTION ORAL
Qty: 100 ML | Refills: 0 | Status: SHIPPED | OUTPATIENT
Start: 2018-01-01 | End: 2018-01-01

## 2018-01-01 RX ORDER — MORPHINE SULFATE ORAL SOLUTION 10 MG/5ML
SOLUTION ORAL
Qty: 100 ML | Refills: 0 | Status: SHIPPED | OUTPATIENT
Start: 2018-01-01

## 2018-01-01 RX ORDER — POTASSIUM CHLORIDE 20MEQ/15ML
10 LIQUID (ML) ORAL DAILY
Qty: 480 ML | Refills: 1 | Status: SHIPPED | OUTPATIENT
Start: 2018-01-01

## 2018-01-01 RX ORDER — LEVOTHYROXINE SODIUM 75 UG/1
TABLET ORAL
Qty: 45 TAB | Refills: 2 | Status: SHIPPED | OUTPATIENT
Start: 2018-01-01

## 2018-01-01 RX ORDER — POTASSIUM CHLORIDE 750 MG/1
TABLET, EXTENDED RELEASE ORAL
Qty: 90 TAB | Refills: 2 | Status: CANCELLED | OUTPATIENT
Start: 2018-01-01

## 2018-01-01 RX ORDER — VANCOMYCIN HYDROCHLORIDE 750 MG/15ML
INJECTION, POWDER, LYOPHILIZED, FOR SOLUTION INTRAVENOUS ONCE
COMMUNITY
End: 2018-01-01 | Stop reason: ALTCHOICE

## 2018-01-01 RX ORDER — BUPROPION HYDROCHLORIDE 100 MG/1
TABLET ORAL
Qty: 270 TAB | Refills: 1 | Status: SHIPPED | OUTPATIENT
Start: 2018-01-01 | End: 2018-01-01

## 2018-01-03 NOTE — PROGRESS NOTES
Contacted patient to follow up on fever and PEG tube. No answer. Left message introducing self, role and reason for call. Requested return call. Contact information provided.

## 2018-01-11 NOTE — PROCEDURES
[facility]   *** FINAL REPORT ***    Name: Tayo Boykin  MRN: [id number]       Outpatient  : 02 Dec 1955  HIS Order #: [id number]  [id number] [facility] Visit #: [id number]  Date: 2018    TYPE OF TEST: Cerebrovascular Duplex    REASON FOR TEST  Carotid disease    Right Carotid:-             Proximal               Mid                 Distal  cm/s  Systolic  Diastolic  Systolic  Diastolic  Systolic  Diastolic  CCA:    862.1      29.0                            92.0      28.0  Bulb:   102.0      25.0  ECA:    123.0      19.0  ICA:    132.0      41.0      166.0      45.0      113.0      33.0  ICA/CCA:  1.7       1.6    ICA Stenosis: 50-69%    Right Vertebral:-  Finding: Not visualized  Sys:  Hailey:    Right Subclavian:    Left Carotid:-            Proximal                Mid                 Distal  cm/s  Systolic  Diastolic  Systolic  Diastolic  Systolic  Diastolic  CCA:     52.2      19.0                            70.0      22.0  Bulb:    56.0      17.0  ECA:     67.0      10.0  ICA:     67.0      25.0      145.0      44.0      231.0      69.0  ICA/CCA:  3.2       3.6    ICA Stenosis: 50-69%    Left Vertebral:-  Finding: Antegrade  Sys:       54.0  Hailey:       21.0    Left Subclavian:    INTERPRETATION/FINDINGS  Duplex images were obtained using 2-D gray scale, color flow and  spectral doppler analysis. 1. Bilateral moderate 50-69% stenosis of the internal carotid  arteries. 2. No significant stenosis in the external carotid arteries  bilaterally. 3. Unable to visualize the right vertebral artery, possible occlusion. 4. Antegrade flow in the left vertebral artery. Plaque Morphology:  1. Heterogeneous plaque in the bulb and right ICA. 2. Heterogeneous plaque in the bulb and left ICA. No significant changes when compared to previous study. ADDITIONAL COMMENTS    I have personally reviewed the data relevant to the interpretation of  this  study. TECHNOLOGIST: Linnea Sandoval RVT, BS  Signed: 2018 10:50 AM    PHYSICIAN: Babs Franz D.O.   Signed: 01/11/2018 05:19 PM

## 2018-01-11 NOTE — PROCEDURES
[facility]   *** FINAL REPORT ***    Name: Briseyda Angeles  MRN: [id number]       Outpatient  : 02 Dec 1955  HIS Order #: [id number]  [address] [facility] Visit #: [id number]  Date: 2018    TYPE OF TEST: Visceral Arterial Duplex    REASON FOR TEST  Celiac stenosis, Peripheral vascular dz NOS    Aortic PSV:  74.0 cm/s  Diameter AP:     cm   TV:     cm                   Right          Left  Renal Artery:- -------------  -------------  Proximal  PSV:  96.0           74.0  Mid       PSV:  Distal    PSV:  Aortic ratio :   1.3            1.0    Medullary PSV:            EDV:            EDR:            SDR:    Cortical  PSV:            EDV:            EDR:            SDR:  Stenosis:      Mild < 60%     Mild < 60%  Kidney size:        cm             cm               x      cm      x      cm    Hilar:-        Right          Left  Acc. Time  AT:     secs           secs  Acc. Index AI:             RI:    Mesenteric:-                  Prox   Mid   Dist Ratio Stenosis          Aneurysm                  ----- ----- ----- ----- ----------------- ------------  SMA:            243.0 194.0 181.0  3.3 Normal  Celiac:         442.0               6.0 Severe > 70%  Hepatic:        106.0               1.4  Splenic:  FIONA:  :    INTERPRETATION/FINDINGS  Duplex images were obtained using 2-D gray scale, color flow and  spectral doppler analysis. MESENTERIC:  1. Celiac artery stent patent with elevated velocitites noted  throughout. Velocity of 442 cm/sec noted in the mid segment. Proximal hepatic artery patent with turbulent signals. Unable to  access spleenic artery. 2. SMA patent without significant stenosis. FIONA not identified. 3. Proximal renal origins patent. ADDITIONAL COMMENTS  Celiac Stent:  Prx 280 cm/sec   Mid 442 cm/sec    Dst 425 cm/sec  Outflow 365 cm/sec    I have personally reviewed the data relevant to the interpretation of  this  study. TECHNOLOGIST: Janiya Sandoval RVT, BS  Signed: 2018 11:32 AM    PHYSICIAN: Purvi Hilton D.O.   Signed: 01/11/2018 05:19 PM

## 2018-01-16 NOTE — MR AVS SNAPSHOT
[address] [facility] 
 
 
 [address] [place] [address] [address] [facility] 
[phone] Patient: Marcell Mauricio MRN: [id number] Visit Information Date & Time Provider Department Dept. Phone Encounter #  
 1/16/2018 10:45 AM MD [facility] [phone] [id number] Follow-up Instructions Return in about 6 months (around 7/16/2018). Your Appointments 1/30/2018  9:30 AM  
Office Visit with Ana Guillaume MD  
[facility]) Appt Note: 3 month follow up; confirmed; 3 month follow up confirmed [address], [facility] Jarchris Scot [address] [place]  
  
   
 [address] [facility] 5/15/2018 10:15 AM  
Follow Up with Monse Glaser NP [address] ([facility]) Appt Note: 6 mo f/u  
 [address] [address]  
[phone]  
  
   
 [facility] [address]  
  
    
 7/18/2018 10:00 AM  
PROCEDURE with [facility] 1 [facility] ([facility]) Appt Note: CV KAUSHIK [address] [place], [place] [address] [facility]  
[phone] [address] [employer] [place]  
  
    
 7/31/2018 10:30 AM  
Follow Up with MD [facility]) Appt Note: 6 MONTH FOLLOW UP AFTER CAROTID  
 [address] [place], [place] [address] [facility]  
[phone] [address] [facility] [address] [employer] [place]  
  
    
  
 2/14/2018 11:00 AM  
TRANSITIONAL CARE MANAGEMENT with Ana Guillaume MD  
[facility] [address] [place] [address]) Appt Note: SANDHYA ([id number]) [facility] 12/12-12/14 ORIF right great toe; confirmed; ov  
 [address] [facility], [address] [place] [address]  
[phone] [address] [facility], [address] [facility] [address] [place] [address] Upcoming Health Maintenance Date Due ZOSTER VACCINE AGE 60> 10/2/2017 Pneumococcal 19-64 Highest Risk (3 of 3 - PCV13) 10/13/2017 MEDICARE YEARLY EXAM 3/3/2018 PAP AKA CERVICAL CYTOLOGY 11/17/2018 BREAST CANCER SCRN MAMMOGRAM 10/18/2019 COLONOSCOPY 1/10/2023 DTaP/Tdap/Td series (2 - Td) 3/6/2027 Allergies as of 1/16/2018  Review Complete On: 1/16/2018 By: Daisy Soria MD  
  
 Severity Noted Reaction Type Reactions Percocet [Oxycodone-acetaminophen]  12/29/2013    Itching, Hives Percodan [Oxycodone-aspirin]  05/21/2014    Rash, Itching Plavix [Clopidogrel]  04/10/2013    Rash, Hives Current Immunizations  Reviewed on 10/13/2016 Name Date Influenza Vaccine (Quad) PF 9/5/2017 10:30 AM, 10/13/2016  3:26 PM, 11/17/2015  3:55 PM  
 Influenza Vaccine PF 11/4/2014, 10/21/2013  1:03 PM  
 Pneumococcal Polysaccharide (PPSV-23) 10/13/2016  3:52 PM  
 Tdap 3/6/2017 Not reviewed this visit You Were Diagnosed With   
  
 Codes Comments Bilateral carotid artery disease ([facility]    -  Primary ICD-10-CM: I77.9 ICD-9-CM: 447.9 Celiac artery stenosis (HCC)     ICD-10-CM: I77.4 ICD-9-CM: 139. 4 Vitals BP Pulse Resp Height(growth percentile) Weight(growth percentile) BMI  
 102/60 (BP 1 Location: Left arm, BP Patient Position: Sitting) 60 20 5' 2\" (1.575 m) 77 lb (34.9 kg) 14.08 kg/m2 OB Status Smoking Status Postmenopausal Current Every Day Smoker Vitals History BMI and BSA Data Body Mass Index Body Surface Area 14.08 kg/m 2 1.24 m 2 Preferred Pharmacy Pharmacy Name Phone [address], [address] [place] [address] [phone] Your Updated Medication List  
  
   
This list is accurate as of: 1/16/18 11:58 AM.  Always use your most recent med list.  
  
  
  
  
 albuterol 2.5 mg /3 mL (0.083 %) nebulizer solution Commonly known as:  PROVENTIL VENTOLIN  
2.5 mg by Nebulization route as needed for Wheezing. * aspirin 81 mg chewable tablet 81 mg by PEG Tube route daily. * aspirin 325 mg tablet Commonly known as:  ASPIRIN Take 1 Tab by mouth two (2) times a day for 100 doses. CENTRUM SILVER WOMEN PO Take 1 Tab by mouth daily. Crushed via Peg tube  
  
 cyanocobalamin 500 mcg tablet Commonly known as:  VITAMIN B12  
500 mcg by PEG Tube route daily. Via peg  
  
 cyclobenzaprine 5 mg tablet Commonly known as:  FLEXERIL  
1 Tab by Per G Tube route three (3) times daily as needed for Muscle Spasm(s). gabapentin 300 mg capsule Commonly known as:  NEURONTIN  
1 Cap by Per G Tube route three (3) times daily. levETIRAcetam 100 mg/ml Soln oral solution Commonly known as:  KEPPRA 10 mL by Per G Tube route two (2) times a day. levothyroxine 75 mcg tablet Commonly known as:  SYNTHROID  
0.5 Tabs by Per G Tube route Daily (before breakfast). lidocaine HCl 3 % topical cream  
Commonly known as:  XYLOCAINE Apply  to affected area two (2) times a day. Indications: WITH DESITIN  
  
 melatonin 3 mg tablet 1 Tab by Per G Tube route nightly as needed. morphine 10 mg/5 mL oral solution 2.5ml or 5ml by peg tube every 8 hours as needed for pain  
  
 multivitamin liquid Commonly known as:  THERAGRAN  
10 mL by PEG Tube route daily. NEBULIZER  
by Does Not Apply route. omeprazole 2.5 mg Sudr delayed release suspension Commonly known as:  PRILOSEC  
20 mg by Per G Tube route two (2) times a day. * potassium chloride 10 mEq tablet Commonly known as:  KLOR-CON 10 mEq by Feeding Tube route daily. * potassium chloride 20 mEq/15 mL solution Commonly known as:  KAON 10% Take 10 mEq by mouth daily. PROSOURCE NO CARB 15-60 gram-kcal/30 mL Lipk Generic drug:  amino acids-protein hydrolys Take 30 mL by mouth daily. VITAMIN D3 2,000 unit Tab Generic drug:  cholecalciferol (vitamin D3) 1 Tab by PEG Tube route daily. zoledronic acid 5 mg/100 mL Pgbk Commonly known as:  RECLAST 5 mg by IntraVENous route once. Once a year * Notice: This list has 4 medication(s) that are the same as other medications prescribed for you. Read the directions carefully, and ask your doctor or other care provider to review them with you. Follow-up Instructions Return in about 6 months (around 7/16/2018). To-Do List   
 07/16/2018 Imaging:  DUPLEX CAROTID BILATERAL AMB Please provide this summary of care documentation to your next provider. Your primary care clinician is listed as Ricardo Garrett. If you have any questions after today's visit, please call [phone].

## 2018-01-16 NOTE — TELEPHONE ENCOUNTER
Reviewed report generated by the [facility]. Does not demonstrate aberrancies or inconsistencies with regard to the prescribing of controlled medications to this patient by other providers. Last filled 12/22/2017 per .

## 2018-01-16 NOTE — PROGRESS NOTES
Abner Robe    Chief Complaint   Patient presents with    Carotid Artery Stenosis       History and Physical    12year-old female here for follow-up today regarding surveillance for her carotids as well as a celiac artery. 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 She tells me she has had no TIAs no amaurosis no neurologic events since seen last.  Unfortunately she did fall and have a fracture requiring a cast on her lower extremity. She . She has not had any says she is feeling better regarding this postprandial pain she eats, has satiety and feels better. No diarrhea after eating. Past Medical History:   Diagnosis Date    Age-related osteoporosis with current pathological fracture 5/21/2017    Cigarette smoker     Closed avulsion fracture of greater trochanter of femur ([address] 4/7/2017    COPD (chronic obstructive pulmonary disease) ([address]     Esophageal stricture 2014    s/p XRT for oropharyngeal cancer; requiring GJ tube for nutrition. 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    s/p stent    Vitamin D deficiency      Patient Active Problem List   Diagnosis Code    Melanoma right scapula C43.9    COPD (chronic obstructive pulmonary disease) ([address] J44.9    Carotid artery disease ([address] bilateral moderate I77.9    Celiac artery stenosis, status post stent I77.4    Severe protein-calorie malnutrition ([address] E43    Squamous cell carcinoma of oropharynx, with PEG tube and tracheostomy C10.9    Anemia D64.9    Esophageal stricture K22.2    Vitamin D deficiency E55.9    Acquired hypothyroidism E03.9  Hyponatremia E87.1    Carpal tunnel syndrome, left G56.02    GI bleed K92.2    Dysphagia, 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    S/P percutaneous endoscopic gastrostomy (PEG) tube placement ([facility]) Z93.1    History of radiation to head and neck region Z92.3    Current smoker F17.200    Essential hypertension I10    Multiple pulmonary nodules R91.8    Closed avulsion fracture of greater trochanter of femur ([address] S72.113A    Seizures ([facility]) R56.9    Age-related osteoporosis with current pathological fracture M80.00XA    Foot dislocation, right, initial encounter S93.304A     Past Surgical History:   Procedure Laterality Date    ABDOMEN SURGERY PROC UNLISTED      \"ulcer\" surgery    HX ENDOSCOPY  5/9/2014    w/ dilation    HX ENDOSCOPY  5/13/2014    w/ dilation    HX ENDOSCOPY  5/21    with Dilatation    HX ENDOSCOPY  6/4/2014    w/ dilation    HX GI      HX ORTHOPAEDIC      intramedullary chao fixation of the right tiba    HX OTHER SURGICAL      skin cancer removal from right shoulder; PEG TUBE    HX TRACHEOSTOMY       Current Outpatient Prescriptions   Medication Sig Dispense Refill    morphine 10 mg/5 mL oral solution 2.5ml or 5ml by peg tube every 8 hours as needed for pain 100 mL 0    cyclobenzaprine (FLEXERIL) 5 mg tablet 1 Tab by Per G Tube route three (3) times daily as needed for Muscle Spasm(s). 25 Tab 1    potassium chloride (KAON 10%) 20 mEq/15 mL solution Take 10 mEq by mouth daily.  MULTIVIT-MIN/IRON/FOLIC/LUTEIN (CENTRUM SILVER WOMEN PO) Take 1 Tab by mouth daily. Crushed via Peg tube      NEBULIZER by Does Not Apply route.  aspirin (ASPIRIN) 325 mg tablet Take 1 Tab by mouth two (2) times a day for 100 doses. 100 Tab 0    zoledronic acid (RECLAST) 5 mg/100 mL pgbk 5 mg by IntraVENous route once. Once a year      levothyroxine (SYNTHROID) 75 mcg tablet 0.5 Tabs by Per G Tube route Daily (before breakfast).  45 Tab 2    gabapentin (NEURONTIN) 300 mg capsule 1 Cap by Per G Tube route three (3) times daily. 90 Cap 3    amino acids-protein hydrolys (PROSOURCE NO CARB) 15-60 gram-kcal/30 mL lipk Take 30 mL by mouth daily.  levETIRAcetam (KEPPRA) 100 mg/ml soln oral solution 10 mL by Per G Tube route two (2) times a day. 600 mL 5    melatonin 3 mg tablet 1 Tab by Per G Tube route nightly as needed. 30 Tab 0    albuterol (PROVENTIL VENTOLIN) 2.5 mg /3 mL (0.083 %) nebulizer solution 2.5 mg by Nebulization route as needed for Wheezing.  cholecalciferol, vitamin D3, (VITAMIN D3) 2,000 unit tab 1 Tab by PEG Tube route daily.  omeprazole (PRILOSEC) 2.5 mg suDR delayed release suspension 20 mg by Per G Tube route two (2) times a day.  lidocaine HCl (XYLOCAINE) 3 % topical cream Apply  to affected area two (2) times a day. Indications: WITH DESITIN      aspirin 81 mg chewable tablet 81 mg by PEG Tube route daily.  cyanocobalamin (VITAMIN B12) 500 mcg tablet 500 mcg by PEG Tube route daily. Via peg       multivitamin (THERAGRAN) liquid 10 mL by PEG Tube route daily.  potassium chloride (K-DUR, KLOR-CON) 10 mEq tablet 10 mEq by Feeding Tube route daily. Allergies   Allergen Reactions    Percocet [Oxycodone-Acetaminophen] Itching and Hives    Percodan [Oxycodone-Aspirin] Rash and Itching    Plavix [Clopidogrel] Rash and Hives       Review of Systems    A full review of systems was completed times ten organ systems and was deemed negative unless otherwise mentioned in the HPI.     Physical   Visit Vitals    /60 (BP 1 Location: Left arm, BP Patient Position: Sitting)    Pulse 60    Resp 20    Ht 5' 2\" (1.575 m)    Wt 77 lb (34.9 kg)    BMI 14.08 kg/m2       Good spirits today no distress  Head is  Extremities thin statured wearing a cast stable and atraumatic  Neck no JVD  Chest clear  Cardiac regular  Abdomen soft nondistended nontender lower extremity  Doppler study shows moderate carotid narrowings left side worse than right  Visceral study shows patent vessels with turbulence in the celiac stent stent patent asymptomatic visceral disease turbulence stent but no pain    Impression/Plan:     ICD-10-CM ICD-9-CM    1. Bilateral carotid artery disease (HCC) I77.9 447.9 DUPLEX CAROTID BILATERAL AMB   2. Celiac artery stenosis, status post stent I77.4 447.4 DUPLEX CAROTID BILATERAL AMB   , Continue medical management  Carotid stenosis moderate will surveillance 6 months  Orders Placed This Encounter    DUPLEX CAROTID BILATERAL AMB       Follow-up Disposition:  Return in about 6 months (around 7/16/2018). Kristie López MD    PLEASE NOTE:  This document has been produced using voice recognition software. Unrecognized errors in transcription may be present.

## 2018-01-23 NOTE — TELEPHONE ENCOUNTER
PHONE IN RX    Last Visit: 09/05/2017 with MD Mauricio Iraheta    Next Appointment: 01/30/2018 with MD Mauricio Iraheta   Previous Refill Encounters: 05/08/2017 per MD Martinez 600 mL with 5 refills     Requested Prescriptions     Pending Prescriptions Disp Refills    levETIRAcetam (KEPPRA) 100 mg/ml soln oral solution 600 mL 5     Sig: 10 mL by Per G Tube route two (2) times a day.

## 2018-02-06 NOTE — TELEPHONE ENCOUNTER
Savanna Cotto from [facility] letting  know of elevated pulse today of 107.  [address] [facility] Advise   [phone]

## 2018-02-07 PROBLEM — Z79.899 CONTROLLED SUBSTANCE AGREEMENT SIGNED: Status: ACTIVE | Noted: 2017-08-10

## 2018-02-08 NOTE — TELEPHONE ENCOUNTER
Reviewed report generated by the [facility]. Does not demonstrate aberrancies or inconsistencies with regard to the prescribing of controlled medications to this patient by other providers. Last filled morphine 1/17/2018 per . Patient with a signed  controlled substance agreement on file.

## 2018-02-12 NOTE — TELEPHONE ENCOUNTER
Ralph Khanna with [facility] is calling to inform  pt is re-enrolling for home care starting today    Faisal Guevara

## 2018-02-14 NOTE — MR AVS SNAPSHOT
Moreno Panda 
 
 
 [address], [address] [place] [address] [facility] 
[phone] Patient: Glenny Hdz MRN: [id number] Visit Information Date & Time Provider Department Dept. Phone Encounter #  
 2/14/2018 11:00 AM Adrienne Castillo MD [facility] 94 50 72 Follow-up Instructions Return in about 3 months (around 5/14/2018), or if symptoms worsen or fail to improve. Your Appointments 5/9/2018 11:30 AM  
Office Visit with Adrienne Castillo MD  
[facility] Appt Note: ov 3mos. connor [address], [address] [place] [address] [address]  
  
   
 [address], [address] 5/15/2018 10:15 AM  
Follow Up with Tirso Gore NP [employer] ([facility]) Appt Note: 6 mo f/u  
 [address] [address]  
[phone]  
  
   
 [facility] [address]  
  
    
 7/18/2018 10:00 AM  
PROCEDURE with [facility] IMAGING 1 [facility] ([facility]) Appt Note: CV KAUSHIK [address] [place], [place] [address] [address] [facility]  
[phone] [address]  
  
    
 7/31/2018 10:30 AM  
Follow Up with Bennett Odonnell MD  
[address] [place] [address] and [facility]) Appt Note: 6 MONTH FOLLOW UP AFTER CAROTID  
 [address] [place], [place] [address] [address] [facility]  
[phone] 2300 [facility] 200 [facility] Upcoming Health Maintenance Date Due ZOSTER VACCINE AGE 60> 2/28/2019* Pneumococcal 19-64 Highest Risk (3 of 3 - PCV13) 2/28/2023* PAP AKA CERVICAL CYTOLOGY 11/17/2018 MEDICARE YEARLY EXAM 2/15/2019 BREAST CANCER SCRN MAMMOGRAM 10/18/2019 COLONOSCOPY 1/10/2023 DTaP/Tdap/Td series (2 - Td) 3/6/2027 *Topic was postponed. The date shown is not the original due date. Allergies as of 2/14/2018  Review Complete On: 2/14/2018 By: Antoni Moseley Severity Noted Reaction Type Reactions Megestrol Medium 02/14/2018    Itching Percocet [Oxycodone-acetaminophen]  12/29/2013    Itching, Hives Percodan [Oxycodone-aspirin]  05/21/2014    Rash, Itching Plavix [Clopidogrel]  04/10/2013    Rash, Hives Current Immunizations  Reviewed on 10/13/2016 Name Date Influenza Vaccine (Quad) PF 9/5/2017 10:30 AM, 10/13/2016  3:26 PM, 11/17/2015  3:55 PM  
 Influenza Vaccine PF 11/4/2014, 10/21/2013  1:03 PM  
 Pneumococcal Polysaccharide (PPSV-23) 10/13/2016  3:52 PM  
 Tdap 3/6/2017 Not reviewed this visit You Were Diagnosed With   
  
 Codes Comments Acquired hypothyroidism    -  Primary ICD-10-CM: E03.9 ICD-9-CM: 244.9 Severe protein-calorie malnutrition ([facility] [address]     ICD-10-CM: B44 ICD-9-CM: 373 Iron deficiency anemia, unspecified iron deficiency anemia type     ICD-10-CM: D50.9 ICD-9-CM: 280.9 Vitals BP Pulse Temp Resp Height(growth percentile) Weight(growth percentile)  
 118/60 (BP 1 Location: Left arm, BP Patient Position: Sitting) 95 98.5 °F (36.9 °C) (Oral) 18 5' 2\" (1.575 m) 72 lb (32.7 kg) BMI OB Status Smoking Status 13.17 kg/m2 Postmenopausal Current Every Day Smoker Vitals History BMI and BSA Data Body Mass Index Body Surface Area  
 13.17 kg/m 2 1.2 m 2 Preferred Pharmacy Pharmacy Name Phone [address], [address] [place] [address] [phone] Your Updated Medication List  
  
   
This list is accurate as of: 2/14/18 12:52 PM.  Always use your most recent med list.  
  
  
  
  
 albuterol 2.5 mg /3 mL (0.083 %) nebulizer solution Commonly known as:  PROVENTIL VENTOLIN  
2.5 mg by Nebulization route as needed for Wheezing. aspirin 81 mg chewable tablet 81 mg by PEG Tube route daily. CENTRUM SILVER WOMEN PO Take 1 Tab by mouth daily. Crushed via Peg tube  
  
 cyanocobalamin 500 mcg tablet Commonly known as:  VITAMIN B12  
500 mcg by PEG Tube route daily. Via peg  
  
 cyclobenzaprine 5 mg tablet Commonly known as:  FLEXERIL  
1 Tab by Per G Tube route three (3) times daily as needed for Muscle Spasm(s). gabapentin 300 mg capsule Commonly known as:  NEURONTIN  
1 Cap by Per G Tube route three (3) times daily. levETIRAcetam 100 mg/ml Soln oral solution Commonly known as:  KEPPRA 10 mL by Per G Tube route two (2) times a day. levothyroxine 75 mcg tablet Commonly known as:  SYNTHROID  
0.5 Tabs by Per G Tube route Daily (before breakfast). lidocaine HCl 3 % topical cream  
Commonly known as:  XYLOCAINE Apply  to affected area two (2) times a day. Indications: WITH DESITIN  
  
 melatonin 3 mg tablet 1 Tab by Per G Tube route nightly as needed. morphine 10 mg/5 mL oral solution 2.5ml or 5ml by peg tube every 8 hours as needed for pain  
  
 multivitamin liquid Commonly known as:  THERAGRAN  
10 mL by PEG Tube route daily. NEBULIZER  
by Does Not Apply route. omeprazole 2.5 mg Sudr delayed release suspension Commonly known as:  PRILOSEC  
20 mg by Per G Tube route two (2) times a day. potassium chloride 20 mEq/15 mL solution Commonly known as:  KAON 10% Take 7.5 mL by mouth daily. PROSOURCE NO CARB 15-60 gram-kcal/30 mL Lipk Generic drug:  amino acids-protein hydrolys Take 30 mL by mouth daily. VITAMIN D3 2,000 unit Tab Generic drug:  cholecalciferol (vitamin D3) 1 Tab by PEG Tube route daily. zoledronic acid 5 mg/100 mL Pgbk Commonly known as:  RECLAST 5 mg by IntraVENous route once. Once a year Follow-up Instructions Return in about 3 months (around 5/14/2018), or if symptoms worsen or fail to improve. To-Do List   
 02/14/2018 Lab:  CBC WITH AUTOMATED DIFF   
  
 02/14/2018 Lab:  FERRITIN   
  
 02/14/2018 Lab:  IRON PROFILE   
  
 02/14/2018 Lab:  METABOLIC PANEL, COMPREHENSIVE   
  
 02/14/2018 Lab:  TSH 3RD GENERATION   
  
 02/14/2018 Lab:  VITAMIN B12   
  
 02/15/2018 11:30 AM  
(Arrive by 11:00 AM) Appointment with [facility] ([facility] LAB 2 at [address] ([phone]) No preparation is required for this study. Patient can have their meals and take their medications. Please arrive 30 minutes prior to appointment to register Patient Instructions Preventing Falls: Care Instructions Your Care Instructions Getting around your home safely can be a challenge if you have injuries or health problems that make it easy for you to fall. Loose rugs and furniture in walkways are among the dangers for many older people who have problems walking or who have poor eyesight. People who have conditions such as arthritis, osteoporosis, or dementia also have to be careful not to fall. You can make your home safer with a few simple measures. Follow-up care is a key part of your treatment and safety. Be sure to make and go to all appointments, and call your doctor if you are having problems. It's also a good idea to know your test results and keep a list of the medicines you take. How can you care for yourself at home? Taking care of yourself · You may get dizzy if you do not drink enough water. To prevent dehydration, drink plenty of fluids, enough so that your urine is light yellow or clear like water. Choose water and other caffeine-free clear liquids. If you have kidney, heart, or liver disease and have to limit fluids, talk with your doctor before you increase the amount of fluids you drink. · Exercise regularly to improve your strength, muscle tone, and balance. Walk if you can. Swimming may be a good choice if you cannot walk easily. · Have your vision and hearing checked each year or any time you notice a change. If you have trouble seeing and hearing, you might not be able to avoid objects and could lose your balance. · Know the side effects of the medicines you take. Ask your doctor or pharmacist whether the medicines you take can affect your balance. Sleeping pills or sedatives can affect your balance. · Limit the amount of alcohol you drink. Alcohol can impair your balance and other senses. · Ask your doctor whether calluses or corns on your feet need to be removed. If you wear loose-fitting shoes because of calluses or corns, you can lose your balance and fall. · Talk to your doctor if you have numbness in your feet. Preventing falls at home · Remove raised doorway thresholds, throw rugs, and clutter. Repair loose carpet or raised areas in the floor. · Move furniture and electrical cords to keep them out of walking paths. · Use nonskid floor wax, and wipe up spills right away, especially on ceramic tile floors. · If you use a walker or cane, put rubber tips on it. If you use crutches, clean the bottoms of them regularly with an abrasive pad, such as steel wool. · Keep your house well lit, especially [place], and outside walkways. Use night-lights in areas such as hallways and bathrooms. Add extra light switches or use remote switches (such as switches that go on or off when you clap your hands) to make it easier to turn lights on if you have to get up during the night. · Install sturdy handrails on stairways. · Move items in your cabinets so that the things you use a lot are on the lower shelves (about waist level). · Keep a cordless phone and a flashlight with new batteries by your bed. If possible, put a phone in each of the main rooms of your house, or carry a cell phone in case you fall and cannot reach a phone. Or, you can wear a device around your neck or wrist. You push a button that sends a signal for help. · Wear low-heeled shoes that fit well and give your feet good support. Use footwear with nonskid soles. Check the heels and soles of your shoes for wear. Repair or replace worn heels or soles. · Do not wear socks without shoes on wood floors. · Walk on the grass when the sidewalks are slippery. If you live in an area that gets snow and ice in the winter, sprinkle salt on slippery steps and sidewalks. Preventing falls in the bath · Install grab bars and nonskid mats inside and outside your shower or tub and near the toilet and sinks. · Use shower chairs and bath benches. · Use a hand-held shower head that will allow you to sit while showering. · Get into a tub or shower by putting the weaker leg in first. Get out of a tub or shower with your strong side first. 
· Repair loose toilet seats and consider installing a raised toilet seat to make getting on and off the toilet easier. · Keep your bathroom door unlocked while you are in the shower. Where can you learn more? Go to http://dayrone-Tagraj.info/. Enter [phone] in the search box to learn more about \"Preventing Falls: Care Instructions. \" Current as of: May 12, 2017 Content Version: 11.4 © 4955-4974 [employer]. Care instructions adapted under license by [employer] (which disclaims liability or warranty for this information). If you have questions about a medical condition or this instruction, always ask your healthcare professional. [place] [address] any warranty or liability for your use of this information. Medicare Wellness Visit, Female The best way to improve and maintain good health is to have a healthy lifestyle by eating a well-balanced diet, exercising regularly, limiting alcohol and stopping smoking. Regular visits with your physician or non-physician health care provider also support your good health.  Preventive screening tests can find health problems before they become diseases or illnesses. Preventive services such as immunizations prevent serious infections. All people over age 72 should have a Pneumovax and a Prevnar-13 shot to prevent potentially life threatening infections with the pneumococcus bacteria, a common cause of pneumonia. These are once in a lifetime unless you and your provider decide differently. All people over 65 should have a yearly influenza vaccine or \"flu\" shot. This does not prevent infection with cold viruses but has been proven to prevent hospitalization and death from influenza. Although Medicare part B \"regular Medicare\" currently only covers tetanus vaccination in the context of an injury, a tetanus vaccine (Tdap or Td) is recommended every 10 years. A shingles vaccine is recommended once in a lifetime after age 61. The Shingles vaccine is also not covered by Medicare part B. Note, however, that both the Shingles vaccine and Tdap/Td are generally covered by secondary carriers. Please check your coverage and out of pocket expenses. Consider contacting your local health department because it may stock these vaccines for a reasonable charge. We currently have documentation of the following immunization history for you: 
Immunization History Administered Date(s) Administered  Influenza Vaccine (Quad) PF 11/17/2015, 10/13/2016, 09/05/2017  Influenza Vaccine PF 10/21/2013, 11/04/2014  Pneumococcal Polysaccharide (PPSV-23) 10/13/2016  Tdap 03/06/2017 Screening for infection with Hepatitis C is recommended for anyone born between [birth year] through [address]. The table at the bottom of this document indicates the status of this and other preventive services. A bone mass density test (DEXA) to screen for osteoporosis or thinning of the bones should be done at least once after age 72 and may be done up to every 2 years as determined by you and your health care provider.  The most recent DEXA we have on file for you is: DEXA Results (most recent): 
 
Results from Hospital Encounter encounter on 03/29/17 DEXA BONE DENSITY STUDY AXIAL Narrative DEXA BONE DENSITOMETRY, CENTRAL 
 
CPT CODE: 79128 INDICATION: Postmenopausal. History of osteopenia. Tibia fracture. Vitamin D 
deficiency. Smoker. TECHNIQUE: Using GE LUNAR Prodigy densitometer, bone density measurement was 
performed in the lumbar spine the proximal left and right femora and the left 
forearm. T Score refers to standard deviations above or below average compared 
to a young adult of the same sex. Z Score refers to standard deviations above or 
below average compared to a patient of the same sex, age, race and weight. COMPARISON: June 16, 2008. December 10, 2010. FINDINGS:  
 
Lumbar Spine Levels: L1 and L2 Mean Bone Mineral Density (BMD):  1.064 g/cm2 T Score: -0.9 Z Score: 1.0 BMD decreased 4.9%, which is not statistically significant within a 95 percent 
confidence interval compared to preceding study. BMD decreased 0.4%, which is not statistically significant compared to baseline 
study. On PA image of the lumbar spine obtained for localization of vertebral levels (not a diagnostic quality radiograph), there is scoliosis and apparent increased 
density at multiple levels. The density is not well characterized on the basis 
of this image, but likely degenerative. Since this density spuriously increases 
measured bone mineral density, the least affected levels of L1 and L2 have been 
chosen as the most representative sample of this patient's spine bone mineral 
density. These changes render the lumbar spine of questionable diagnostic 
validity as a site for densitometry in this patient. Distal1/3 Radius BMD:  0.738 g/cm2 T Score: -1.7 Z Score: -0.8 BMD decreased 13.6%, which is statistically significant within a 95 percent 
confidence interval compared to preceding study. Left Total Proximal Femur BMD: 0.693 g/cm2 T Score:  -2.5 Z Score:  -1.1 BMD decreased 6.4%, which is statistically significant within a 95 percent 
confidence interval compared to preceding study. BMD decreased 7.5%, which is statistically significant compared to baseline 
study. Right Total Proximal Femur BMD: 0.736 g/cm2 T Score:  -2.2 Z Score:  -0.7 BMD decreased 5.4%, which is statistically significant within a 95 percent 
confidence interval compared to preceding study. BMD decreased 3.8%, which is statistically significant compared to baseline 
study. Left Femoral Neck BMD:  0.624 g/cm2 T Score:  -3.0 
Z Score:  -1.3 Right Femoral Neck BMD:  0.709 g/cm2 T Score:  -2.4 Z Score:  -0.7 Impression IMPRESSION: 
 
1. BMD measures consistent with osteoporosis. 2.  Compare to the preceding study, BMD has decreased. 3.  Compare to the baseline study, BMD has decreased. Based upon current ISCD guidelines, the patient's overall diagnostic category, 
selected using WHO criteria in postmenopausal women and males aged 48 and above, 
is selected based upon the lowest T Score from among the lumbar spine, total 
femur, femoral neck, (or distal third radius if measured). WHO Definition of Osteoporosis and Osteopenia on DXA (specified for 
post-menopausal  females): 
 
 Normal:                     T Score at or above -1 SD Osteopenia:              T Score between -1 and -2.5 SD Osteoporosis:           T Score at or below -2.5 SD The risk of fracture approximately doubles for each 1 SD decrease in T Score. It is important to consider other factors in assessing a patient's risk of 
fracture, including age, risk of falling/injury, history of fragility fracture, 
family history of osteoporosis, smoking, low weight. Various fracture risk tools have been developed for adult patients and are available online. For example, the FRAX tool developed by [facility] is widely used. Reference www.iscd.org. It is also important to note that DXA measures bone density but does not 
distinguish among causes of decreased bone density, which include primary versus 
secondary osteoporosis (such as metabolic bone disorders or possible effects of 
medications) and also other conditions (such as osteomalacia). Clinical 
considerations should determine what additional evaluation may be warranted to 
exclude secondary conditions in a patient with low bone density. Please note that reliable, valid comparisons can not be made between studies 
which have been performed on different densitometers. If clinically warranted, 
follow up study performed at this site would best permit assessment of trend for 
possible change in bone mineral density over time in comparison to this study. Thank you for this referral.   
 
 
Screening for diabetes mellitus with a blood sugar test (glucose) should be done at least every 3 years until age 79. You and your health care provider may decide whether to continue screening after age 79. The most recent blood glucose we have on file for you is: Lab Results Component Value Date/Time Glucose 82 02/05/2018 04:02 PM  
 Glucose (POC) 86 12/12/2017 08:28 PM  
 
 
 
Glaucoma is a disease of the eye due to increased ocular pressure that can lead to blindness. People with risk factors for glaucoma ( race, diabetes, family history) should be screened at least every 2 years by an eye professional.  
 
Cardiovascular screening tests that check for elevated lipids or cholesterol (fatty part of blood) which can lead to heart disease and strokes should be done every 4-6 years through age 79. You and your health care provider may decide whether to continue screening after age 79. The most recent lipid panel we have on file for you is:  
Lab Results Component Value Date/Time Cholesterol, total 269 (H) 08/29/2017 09:42 AM  
 HDL Cholesterol 129 (H) 08/29/2017 09:42 AM  
 LDL, calculated 116.8 (H) 08/29/2017 09:42 AM  
 VLDL, calculated 23.2 08/29/2017 09:42 AM  
 Triglyceride 116 08/29/2017 09:42 AM  
 CHOL/HDL Ratio 2.1 08/29/2017 09:42 AM  
 
 
Colorectal cancer screening that evaluates for blood or polyps in your colon for people with average risk should be done yearly as a stool test, every five years as a flexible sigmoidoscope or every 10 years as a colonoscopy up to age 76. You and your health care provider may decide whether to continue screening after age 76. Breast cancer screening with a mammogram is recommended at least once every 2 years  for women age 54-69. You and your health care provider may decide whether to continue screening after age 76. The most recent mammogram we have on file for you is: [facility] Results (most recent): 
 
Results from Hospital Encounter encounter on 10/18/17 CORY [address]D RD W MAMMO BI SCREENING INCL CAD Narrative - CORY 3D RD W MAMMO BI SCREENING INCL CAD BILATERAL DIGITAL SCREENING MAMMOGRAM 3D/2D WITH CAD: 10/18/2017 CLINICAL: Routine Screening. The study was acquired using full field digital technology and interpreted from  
soft copy. 3D Digital Breast Tomosynthesis (DBT) images were obtained and used to assist in 
the interpretation of this examination. Current study was also evaluated with a Computer Aided Detection (CAD). Comparison is made to exam dated:  10/13/2016 [facility]. The tissue of both breasts is heterogeneously dense. This may lower the  
sensitivity of mammography. Positioning was difficult due to patient kyphosis. Benign appearing vascular calcifications are present in both breasts. No  
significant masses, calcifications, or other findings are seen in either breast. 
  
 
IMPRESSION: BENIGN Given dense breast tissue, this patient could benefit from supplemental whole breast ultrasound. Vascular calcifications in the breast can be a sign of systemic cardiovascular  
disease. Please correlate with patient history. There is no mammographic evidence of malignancy. A 1 year screening mammogram is 
recommended. Elieser Abdalla calculations report this patient's 10-year risk for  
developing breast cancer at 1.8% and lifetime risk at 4.7%. The patient was notified of the results by mail. Electronically signed by: Leanna guan/ivonne:10/20/2017 10:26:00   
 
 
letter sent: Mehul Velasquez 1/2 Normal   
Mammogram BI-RADS: 2: Benign Screening for cervical cancer with a pap smear is recommended for all women with a cervix until age 72. The frequency of this test is based on the details of her prior pap smear testing. You and your health care provider may decide whether to continue screening after age 72. People who have smoked the equivalent of 1 pack per day for 30 years or more may benefit from screening for lung cancer with a yearly low dose CT scan until they have been non smokers for 15 years or competing health conditions render this unlikely to be beneficial. Our records show:N/A Your Medicare Wellness Exam is recommended annually. Here is a list of your current Health Maintenance items with a due date: 
Health Maintenance Topic Date Due  
 ZOSTER VACCINE AGE 60>  02/28/2019 (Originally 10/2/2017)  Pneumococcal 19-64 Highest Risk (3 of 3 - PCV13) 02/28/2023 (Originally 10/13/2017)  PAP AKA CERVICAL CYTOLOGY  11/17/2018  MEDICARE YEARLY EXAM  02/15/2019  BREAST CANCER SCRN MAMMOGRAM  10/18/2019  COLONOSCOPY  01/10/2023  DTaP/Tdap/Td series (2 - Td) 03/06/2027  Hepatitis C Screening  Addressed  Influenza Age 5 to Adult  Completed Please provide this summary of care documentation to your next provider. Your primary care clinician is listed as Warren Montez.  If you have any questions after today's visit, please call [phone].

## 2018-02-14 NOTE — PATIENT INSTRUCTIONS
Preventing Falls: Care Instructions  Your Care Instructions    Getting around your home safely can be a challenge if you have injuries or health problems that make it easy for you to fall. Loose rugs and furniture in walkways are among the dangers for many older people who have problems walking or who have poor eyesight. People who have conditions such as arthritis, osteoporosis, or dementia also have to be careful not to fall. You can make your home safer with a few simple measures. Follow-up care is a key part of your treatment and safety. Be sure to make and go to all appointments, and call your doctor if you are having problems. It's also a good idea to know your test results and keep a list of the medicines you take. How can you care for yourself at home? Taking care of yourself  · You may get dizzy if you do not drink enough water. To prevent dehydration, drink plenty of fluids, enough so that your urine is light yellow or clear like water. Choose water and other caffeine-free clear liquids. If you have kidney, heart, or liver disease and have to limit fluids, talk with your doctor before you increase the amount of fluids you drink. · Exercise regularly to improve your strength, muscle tone, and balance. Walk if you can. Swimming may be a good choice if you cannot walk easily. · Have your vision and hearing checked each year or any time you notice a change. If you have trouble seeing and hearing, you might not be able to avoid objects and could lose your balance. · Know the side effects of the medicines you take. Ask your doctor or pharmacist whether the medicines you take can affect your balance. Sleeping pills or sedatives can affect your balance. · Limit the amount of alcohol you drink. Alcohol can impair your balance and other senses. · Ask your doctor whether calluses or corns on your feet need to be removed.  If you wear loose-fitting shoes because of calluses or corns, you can lose your balance and fall. · Talk to your doctor if you have numbness in your feet. Preventing falls at home  · Remove raised doorway thresholds, throw rugs, and clutter. Repair loose carpet or raised areas in the floor. · Move furniture and electrical cords to keep them out of walking paths. · Use nonskid floor wax, and wipe up spills right away, especially on ceramic tile floors. · If you use a walker or cane, put rubber tips on it. If you use crutches, clean the bottoms of them regularly with an abrasive pad, such as steel wool. · Keep your house well lit, especially Karlene Catching, and outside walkways. Use night-lights in areas such as hallways and bathrooms. Add extra light switches or use remote switches (such as switches that go on or off when you clap your hands) to make it easier to turn lights on if you have to get up during the night. · Install sturdy handrails on stairways. · Move items in your cabinets so that the things you use a lot are on the lower shelves (about waist level). · Keep a cordless phone and a flashlight with new batteries by your bed. If possible, put a phone in each of the main rooms of your house, or carry a cell phone in case you fall and cannot reach a phone. Or, you can wear a device around your neck or wrist. You push a button that sends a signal for help. · Wear low-heeled shoes that fit well and give your feet good support. Use footwear with nonskid soles. Check the heels and soles of your shoes for wear. Repair or replace worn heels or soles. · Do not wear socks without shoes on wood floors. · Walk on the grass when the sidewalks are slippery. If you live in an area that gets snow and ice in the winter, sprinkle salt on slippery steps and sidewalks. Preventing falls in the bath  · Install grab bars and nonskid mats inside and outside your shower or tub and near the toilet and sinks. · Use shower chairs and bath benches.   · Use a hand-held shower head that will allow you to sit while showering. · Get into a tub or shower by putting the weaker leg in first. Get out of a tub or shower with your strong side first.  · Repair loose toilet seats and consider installing a raised toilet seat to make getting on and off the toilet easier. · Keep your bathroom door unlocked while you are in the shower. Where can you learn more? Go to http://dayron-raj.info/. Enter [phone] in the search box to learn more about \"Preventing Falls: Care Instructions. \"  Current as of: May 12, 2017  Content Version: 11.4  © 4350-7445 [employer]. Care instructions adapted under license by [employer] (which disclaims liability or warranty for this information). If you have questions about a medical condition or this instruction, always ask your healthcare professional. [place] [address] any warranty or liability for your use of this information. Medicare Wellness Visit, Female    The best way to improve and maintain good health is to have a healthy lifestyle by eating a well-balanced diet, exercising regularly, limiting alcohol and stopping smoking. Regular visits with your physician or non-physician health care provider also support your good health. Preventive screening tests can find health problems before they become diseases or illnesses. Preventive services such as immunizations prevent serious infections. All people over age 72 should have a Pneumovax and a Prevnar-13 shot to prevent potentially life threatening infections with the pneumococcus bacteria, a common cause of pneumonia. These are once in a lifetime unless you and your provider decide differently. All people over 65 should have a yearly influenza vaccine or \"flu\" shot. This does not prevent infection with cold viruses but has been proven to prevent hospitalization and death from influenza.     Although Medicare part B \"regular Medicare\" currently only covers tetanus vaccination in the context of an injury, a tetanus vaccine (Tdap or Td) is recommended every 10 years. A shingles vaccine is recommended once in a lifetime after age 61. The Shingles vaccine is also not covered by Medicare part B. Note, however, that both the Shingles vaccine and Tdap/Td are generally covered by secondary carriers. Please check your coverage and out of pocket expenses. Consider contacting your local health department because it may stock these vaccines for a reasonable charge. We currently have documentation of the following immunization history for you:  Immunization History   Administered Date(s) Administered    Influenza Vaccine (Quad) PF 11/17/2015, 10/13/2016, 09/05/2017    Influenza Vaccine PF 10/21/2013, 11/04/2014    Pneumococcal Polysaccharide (PPSV-23) 10/13/2016    Tdap 03/06/2017       Screening for infection with Hepatitis C is recommended for anyone born between [birth year] through 1965. The table at the bottom of this document indicates the status of this and other preventive services. A bone mass density test (DEXA) to screen for osteoporosis or thinning of the bones should be done at least once after age 72 and may be done up to every 2 years as determined by you and your health care provider. The most recent DEXA we have on file for you is:  DEXA Results (most recent):    Results from Hospital Encounter encounter on 03/29/17   DEXA BONE DENSITY STUDY AXIAL   Narrative DEXA BONE DENSITOMETRY, CENTRAL    CPT CODE: 58149    INDICATION: Postmenopausal. History of osteopenia. Tibia fracture. Vitamin D  deficiency. Smoker. TECHNIQUE: Using GE LUNAR Prodigy densitometer, bone density measurement was  performed in the lumbar spine the proximal left and right femora and the left  forearm. T Score refers to standard deviations above or below average compared  to a young adult of the same sex.  Z Score refers to standard deviations above or  below average compared to a patient of the same sex, age, race and weight. COMPARISON: June 16, 2008. December 10, 2010. FINDINGS:     Lumbar Spine Levels: L1 and L2  Mean Bone Mineral Density (BMD):  1.064 g/cm2    T Score: -0.9       Z Score: 1.0      BMD decreased 4.9%, which is not statistically significant within a 95 percent  confidence interval compared to preceding study. BMD decreased 0.4%, which is not statistically significant compared to baseline  study. On PA image of the lumbar spine obtained for localization of vertebral levels  (not a diagnostic quality radiograph), there is scoliosis and apparent increased  density at multiple levels. The density is not well characterized on the basis  of this image, but likely degenerative. Since this density spuriously increases  measured bone mineral density, the least affected levels of L1 and L2 have been  chosen as the most representative sample of this patient's spine bone mineral  density. These changes render the lumbar spine of questionable diagnostic  validity as a site for densitometry in this patient. Distal1/3 Radius BMD:  0.738 g/cm2   T Score: -1.7       Z Score: -0.8   BMD decreased 13.6%, which is statistically significant within a 95 percent  confidence interval compared to preceding study. Left Total Proximal Femur BMD: 0.693 g/cm2    T Score:  -2.5       Z Score:  -1.1       BMD decreased 6.4%, which is statistically significant within a 95 percent  confidence interval compared to preceding study. BMD decreased 7.5%, which is statistically significant compared to baseline  study. Right Total Proximal Femur BMD: 0.736 g/cm2  T Score:  -2.2      Z Score:  -0.7       BMD decreased 5.4%, which is statistically significant within a 95 percent  confidence interval compared to preceding study. BMD decreased 3.8%, which is statistically significant compared to baseline  study.     Left Femoral Neck BMD:  0.624 g/cm2   T Score:  -3.0  Z Score:  -1.3    Right Femoral Neck BMD:  0.709 g/cm2   T Score:  -2.4  Z Score:  -0.7           Impression IMPRESSION:    1. BMD measures consistent with osteoporosis. 2.  Compare to the preceding study, BMD has decreased. 3.  Compare to the baseline study, BMD has decreased. Based upon current ISCD guidelines, the patient's overall diagnostic category,  selected using WHO criteria in postmenopausal women and males aged 48 and above,  is selected based upon the lowest T Score from among the lumbar spine, total  femur, femoral neck, (or distal third radius if measured). WHO Definition of Osteoporosis and Osteopenia on DXA (specified for  post-menopausal  females):     Normal:                     T Score at or above -1 SD   Osteopenia:              T Score between -1 and -2.5 SD   Osteoporosis:           T Score at or below -2.5 SD    The risk of fracture approximately doubles for each 1 SD decrease in T Score. It is important to consider other factors in assessing a patient's risk of  fracture, including age, risk of falling/injury, history of fragility fracture,  family history of osteoporosis, smoking, low weight. Various fracture risk tools have been developed for adult patients and are  available online. For example, the FRAX tool developed by [facility] is widely used. Reference www.iscd.org. It is also important to note that DXA measures bone density but does not  distinguish among causes of decreased bone density, which include primary versus  secondary osteoporosis (such as metabolic bone disorders or possible effects of  medications) and also other conditions (such as osteomalacia). Clinical  considerations should determine what additional evaluation may be warranted to  exclude secondary conditions in a patient with low bone density. Please note that reliable, valid comparisons can not be made between studies  which have been performed on different densitometers.   If clinically warranted,  follow up study performed at this site would best permit assessment of trend for  possible change in bone mineral density over time in comparison to this study. Thank you for this referral.        Screening for diabetes mellitus with a blood sugar test (glucose) should be done at least every 3 years until age 79. You and your health care provider may decide whether to continue screening after age 79. The most recent blood glucose we have on file for you is: Lab Results   Component Value Date/Time    Glucose 82 02/05/2018 04:02 PM    Glucose (POC) 86 12/12/2017 08:28 PM         Glaucoma is a disease of the eye due to increased ocular pressure that can lead to blindness. People with risk factors for glaucoma ( race, diabetes, family history) should be screened at least every 2 years by an eye professional.     Cardiovascular screening tests that check for elevated lipids or cholesterol (fatty part of blood) which can lead to heart disease and strokes should be done every 4-6 years through age 79. You and your health care provider may decide whether to continue screening after age 79. The most recent lipid panel we have on file for you is:   Lab Results   Component Value Date/Time    Cholesterol, total 269 (H) 08/29/2017 09:42 AM    HDL Cholesterol 129 (H) 08/29/2017 09:42 AM    LDL, calculated 116.8 (H) 08/29/2017 09:42 AM    VLDL, calculated 23.2 08/29/2017 09:42 AM    Triglyceride 116 08/29/2017 09:42 AM    CHOL/HDL Ratio 2.1 08/29/2017 09:42 AM       Colorectal cancer screening that evaluates for blood or polyps in your colon for people with average risk should be done yearly as a stool test, every five years as a flexible sigmoidoscope or every 10 years as a colonoscopy up to age 76. You and your health care provider may decide whether to continue screening after age 76. Breast cancer screening with a mammogram is recommended at least once every 2 years  for women age 54-69.  You and your health care provider may decide whether to continue screening after age 76. The most recent mammogram we have on file for you is:   [facility] Results (most recent):    Results from [place] encounter on 10/18/17   CORY 3D RD W MAMMO BI SCREENING INCL CAD   Narrative - CORY 3D RD W MAMMO BI SCREENING INCL CAD  BILATERAL DIGITAL SCREENING MAMMOGRAM 3D/2D WITH CAD: 10/18/2017  CLINICAL: Routine Screening. The study was acquired using full field digital technology and interpreted from   soft copy. 3D Digital Breast Tomosynthesis (DBT) images were obtained and used to assist in  the interpretation of this examination. Current study was also evaluated with a Computer Aided Detection (CAD). Comparison is made to exam dated:  10/13/2016 [facility]. The tissue of both breasts is heterogeneously dense. This may lower the   sensitivity of mammography. Positioning was difficult due to patient kyphosis. Benign appearing vascular calcifications are present in both breasts. No   significant masses, calcifications, or other findings are seen in either breast.       IMPRESSION: BENIGN  Given dense breast tissue, this patient could benefit from supplemental whole   breast ultrasound. Vascular calcifications in the breast can be a sign of systemic cardiovascular   disease. Please correlate with patient history. There is no mammographic evidence of malignancy. A 1 year screening mammogram is  recommended. Gely Camp calculations report this patient's 10-year risk for   developing breast cancer at 1.8% and lifetime risk at 4.7%. The patient was notified of the results by mail. Electronically signed by:  Simeon guan/ivonne:10/20/2017 10:26:00        letter sent: Giorgio Black 1/2 Normal    Mammogram BI-RADS: 2: Benign        Screening for cervical cancer with a pap smear is recommended for all women with a cervix until age 72.  The frequency of this test is based on the details of her prior pap smear testing. You and your health care provider may decide whether to continue screening after age 72. People who have smoked the equivalent of 1 pack per day for 30 years or more may benefit from screening for lung cancer with a yearly low dose CT scan until they have been non smokers for 15 years or competing health conditions render this unlikely to be beneficial. Our records show:N/A    Your Medicare Wellness Exam is recommended annually.     Here is a list of your current Health Maintenance items with a due date:  Health Maintenance   Topic Date Due    ZOSTER VACCINE AGE 60>  02/28/2019 (Originally 10/2/2017)    Pneumococcal 19-64 Highest Risk (3 of 3 - PCV13) 02/28/2023 (Originally 10/13/2017)    PAP AKA CERVICAL CYTOLOGY  11/17/2018    MEDICARE YEARLY EXAM  02/15/2019    BREAST CANCER SCRN MAMMOGRAM  10/18/2019    COLONOSCOPY  01/10/2023    DTaP/Tdap/Td series (2 - Td) 03/06/2027    Hepatitis C Screening  Addressed    Influenza Age 5 to Adult  Completed

## 2018-02-14 NOTE — PROGRESS NOTES
Chief Complaint   Patient presents with    Transitions Of Care     Follow up from [facility] 12/12/2018-12/14/2018 for right foot surgery. Health Maintenance Due   Topic Date Due    ZOSTER VACCINE AGE 60>  10/02/2017    Pneumococcal 19-64 Highest Risk (3 of 3 - PCV13) 10/13/2017     1. Have you been to the ER, urgent care clinic or hospitalized since your last visit? YES.     2. Have you seen or consulted any other health care providers outside of the [facility] since your last visit (Include any pap smears or colon screening)? YES. Oncologist 1 week ago with Dr. Addison Mccormack. Do you have an Advanced Directive? NO    Would you like information on Advanced Directives? NO  Patient's blood drawn, for lab, out of right antecubital area without complications.

## 2018-02-15 PROBLEM — M86.9 BONE INFECTION, ANKLE/FOOT (HCC): Status: ACTIVE | Noted: 2018-01-01

## 2018-02-15 NOTE — ACP (ADVANCE CARE PLANNING)
Advance Care Planning (ACP) Provider Note - Comprehensive     Date of ACP Conversation: 02/14/18  Persons included in Conversation:  patient and family  Length of ACP Conversation in minutes:  16 minutes    Authorized Decision Maker (if patient is incapable of making informed decisions):   This person is:  Healthcare Agent/Medical Power of  under Advance Directive          General ACP for ALL Patients with Decision Making Capacity:   Importance of advance care planning, including choosing a healthcare agent to communicate patient's healthcare decisions if patient lost the ability to make decisions, such as after a sudden illness or accident  Understanding of the healthcare agent role was assessed and information provided  Exploration of values, goals, and preferences if recovery is not expected, even with continued medical treatment in the event of: Imminent death  Severe, permanent brain injury  \"In these circumstances, what matters most to you? \"  Care focused more on comfort or quality of life. Review of Existing Advance Directive:  Patient has not completed an advance directive previously. She states that she would designate her  as marika healthcare agent. She expresses that she does not wish life prolonging procedures for end of life care. For Serious or Chronic Illness:  Understanding of medical condition      Interventions Provided:  Recommended completion of Advance Directive form after review of ACP materials and conversation with prospective healthcare agent   Recommended communicating the plan and making copies for the healthcare agent, personal physician, and others as appropriate (e.g., health system)  Recommended review of completed ACP document annually or upon change in health status   Recommended to complete advance directive and return completed form to office to be copied and scanned into chart.

## 2018-02-15 NOTE — PROGRESS NOTES
This is a Subsequent Medicare Annual Wellness Exam (AWV) (Performed 12 months after IPPE or effective date of Medicare Part B enrollment)    I have reviewed the patient's medical history in detail and updated the computerized patient record. History     Past Medical History:   Diagnosis Date    Age-related osteoporosis with current pathological fracture 5/21/2017    Body mass index (BMI) less than 16.5     13.2    Cigarette smoker     Closed avulsion fracture of greater trochanter of femur ([address] 4/7/2017    COPD [facility]) [facility]     Esophageal stricture 2014    s/p XRT for oropharyngeal cancer; requiring GJ tube for nutrition. 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     Osteopenia     Panic disorder     Peptic ulcer disease     Seizures ([facility] [address] 5/21/2017    Stenosis of celiac artery ([facility] [address] 2/2013    s/p stent    Surgical wound infection     right foot    Vitamin D deficiency       Past Surgical History:   Procedure Laterality Date    ABDOMEN SURGERY PROC UNLISTED      \"ulcer\" surgery    HX ENDOSCOPY  5/9/2014    w/ dilation    HX ENDOSCOPY  5/13/2014    w/ dilation    HX ENDOSCOPY  5/21    with Dilatation    HX ENDOSCOPY  6/4/2014    w/ dilation    HX GI      HX ORTHOPAEDIC      intramedullary chao fixation of the right tiba    HX OTHER SURGICAL      skin cancer removal from right shoulder; PEG TUBE    HX TRACHEOSTOMY       Facility-Administered Medications Ordered in Other Visits   Medication Dose Route Frequency Provider Last Rate Last Dose    [START ON 2/18/2018] heparin (porcine) pf 50 Units  50 Units InterCATHeter DAILY Cecile Tapia MD        heparin (porcine) pf 50 Units  50 Units InterCATHeter PRN Cecile Tapia MD        piperacillin-tazobactam (Henrietta Savers) 3.375 g in 0.9% sodium chloride 100 mL IVPB  3.375 g IntraVENous Q8H Raf Guajardo MD   3.375 g at 02/17/18 1619    zinc gluconate tablet 50 mg  1 Tab Oral DAILY Raf Guajardo MD   50 mg at 02/17/18 0837    lansoprazole (PREVACID SOLUTAB) disintegrating tablet 30 mg  30 mg Oral BID Heidi Mayen MD   30 mg at 02/17/18 1618    sucralfate (CARAFATE) 100 mg/mL oral suspension 1 g  1 g Oral AC&HS Heidi Mayen MD   1 g at 02/17/18 1618    albuterol (PROVENTIL VENTOLIN) nebulizer solution 2.5 mg  2.5 mg Nebulization PRN Arsalan Chris MD        Amino Acids-Protein Hydrolys 15-60 gram-kcal/30 mL liqd 30 mL (Patient Supplied)  30 mL Oral DAILY Arsalan Chris MD   30 mL at 02/17/18 1000    aspirin chewable tablet 81 mg  81 mg Oral DAILY Arsalan Chris MD   81 mg at 02/17/18 3537    cholecalciferol (VITAMIN D3) tablet 2,000 Units  2,000 Units PEG Tube DAILY Arsalan Chris MD   2,000 Units at 02/17/18 2266    cyanocobalamin (VITAMIN B12) tablet 500 mcg  500 mcg Oral DAILY Arsalan Chris MD   500 mcg at 02/17/18 1523    cyclobenzaprine (FLEXERIL) tablet 5 mg  5 mg Per G Tube TID PRN Arsalan Chris MD        gabapentin (NEURONTIN) capsule 300 mg  300 mg Per G Tube TID Arsalan Chris MD   300 mg at 02/17/18 1618    levETIRAcetam (KEPPRA) oral solution 1,000 mg  1,000 mg Per G Tube BID Arsalan Chris MD   1,000 mg at 02/17/18 1617    levothyroxine (SYNTHROID) tablet 37.5 mcg  37.5 mcg Per G Tube ACB Arsalan Chris MD   37.5 mcg at 02/17/18 8421    lidocaine HCl (XYLOCAINE) 3 % cream   Topical BID Arsalan Chris MD        melatonin tablet 3 mg  3 mg Per G Tube QHS PRN Arsalan Chris MD   3 mg at 02/16/18 2347    morphine 10 mg/5 mL oral solution 5-10 mg  5-10 mg Oral Q4H PRN Arsalan Chris MD   5 mg at 02/17/18 1617    therapeutic multivitamin-minerals (THERAGRAN-M) tablet 1 Tab  1 Tab Oral DAILY Arsalan Chris MD   1 Tab at 02/17/18 0836    potassium chloride (KAON 10%) 20 mEq/15 mL oral liquid 10 mEq  10 mEq Oral DAILY Sophie Dolan MD   10 mEq at 02/17/18 0836    dextrose 5 % - 0.45% NaCl infusion  100 mL/hr IntraVENous CONTINUOUS Sophie Dolan  mL/hr at 02/16/18 0945 100 mL/hr at 02/16/18 0945    acetaminophen (TYLENOL) solution 650 mg  650 mg Oral Q4H PRN Sophie Dolan MD        naloxone [facility]) injection 0.1 mg  0.1 mg IntraVENous PRN Sophie Dolan MD        morphine injection 4-10 mg  4-10 mg IntraVENous Q4H PRN Sophie Dolan MD        [facility] injection 6.25 mg  6.25 mg IntraMUSCular PRN Sophie Dolan MD         Allergies   Allergen Reactions    Megestrol Itching    Percocet [Oxycodone-Acetaminophen] Itching and Hives    Percodan [Oxycodone-Aspirin] Rash and Itching    Plavix [Clopidogrel] Rash and Hives     Family History   Problem Relation Age of Onset    Heart Disease Mother      Social History   Substance Use Topics    Smoking status: Current Every Day Smoker     Packs/day: 0.50     Years: 42.00     Types: Cigarettes    Smokeless tobacco: Never Used    Alcohol use 8.4 oz/week     14 Cans of beer, 0 Standard drinks or equivalent per week     Patient Active Problem List   Diagnosis Code    Melanoma right scapula C43.9    COPD (chronic obstructive pulmonary disease) ([facility]) J44.9    Carotid artery disease ([facility] [address] bilateral moderate I77.9    Celiac artery stenosis, status post stent I77.4    Severe protein-calorie malnutrition ([facility] [address] E43    Squamous cell carcinoma of oropharynx, with PEG tube and tracheostomy C10.9    Anemia D64.9    Esophageal stricture K22.2    Vitamin D deficiency E55.9    Acquired hypothyroidism E03.9    Hyponatremia E87.1    Carpal tunnel syndrome, left G56.02    GI bleed K92.2    Dysphagia, 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    S/P percutaneous endoscopic gastrostomy (PEG) tube placement ([facility]) Z93.1    History of radiation to head and neck region Z92.3    Current smoker F17.200    Essential hypertension I10    Multiple pulmonary nodules R91.8    Closed avulsion fracture of greater trochanter of femur ([address] S72.113A    Seizures (HCC) R56.9    Age-related osteoporosis with current pathological fracture M80.00XA    Foot dislocation, right, sequela S93.304S    Controlled substance agreement signed Z79.899    Bone infection, ankle/foot ([address] M86.9       Depression Risk Factor Screening:     PHQ over the last two weeks 2/14/2018   Little interest or pleasure in doing things Not at all   Feeling down, depressed or hopeless Not at all   Total Score PHQ 2 0     Alcohol Risk Factor Screening: You do not drink alcohol or very rarely. Functional Ability and Level of Safety:   Hearing Loss  Hearing is good. Activities of Daily Living  The home contains: no safety equipment. Patient needs help with:  transportation, shopping, preparing meals, housework, managing medications, eating, bathing, bathroom needs and walking    Fall Risk  Fall Risk Assessment, last 12 mths 2/14/2018   Able to walk? Yes   Fall in past 12 months? Yes   Fall with injury?  Yes   Number of falls in past 12 months 1   Fall Risk Score 2       Abuse Screen  Patient is not abused    Cognitive Screening   Evaluation of Cognitive Function:  Has your family/caregiver stated any concerns about your memory: no  Normal    Patient Care Team   Patient Care Team:  Addis Pulido MD as PCP - General (Internal Medicine)  Faby Kevin MD (Gastroenterology)  Tho Reagan MD (Vascular Surgery)  Mayank Telles MD (Otolaryngology)  Jelani Marcelo PA-C (Physician Assistant)  Harman Jeffries MD (Gastroenterology)  Patricia Patel [place] (Vascular Surgery)  Sabina Garza MD (Otolaryngology)  Adelia Mosqueda, JEFFRY as Ambulatory Care Navigator (Internal Medicine)  Kentrell Peñaloza MD (Oncology)  Yuko Smith MD (Gastroenterology)  Jhonatan Rodriguez MD (Surgery)  Danilo Carballo MD (Ophthalmology)  Betty Roman MD (Ophthalmology)  Thomas Tabor, RN as Ambulatory Care Navigator (Internal Medicine)  Daphney Peters DO (Internal Medicine)  Elly Ziegler MD (Orthopedic Surgery)    Assessment/Plan   Education and counseling provided:  Are appropriate based on today's review and evaluation  End-of-Life planning (with patient's consent)  Influenza Vaccine  Screening Mammography  Screening Pap and pelvic (covered once every 2 years)  Colorectal cancer screening tests  Cardiovascular screening blood test  Bone mass measurement (DEXA)  Screening for glaucoma  Diabetes screening test    Diagnoses and all orders for this visit:    1. Foot dislocation, right, sequela    2. Bone infection, ankle/foot ([address]    3. Severe protein-calorie malnutrition (HCC)  -     METABOLIC PANEL, COMPREHENSIVE; Future  -     VITAMIN B12; Future    4. Acquired hypothyroidism  -     TSH 3RD GENERATION; Future    5. Squamous cell carcinoma of oropharynx, with PEG tube and tracheostomy    6. Essential hypertension    7. Esophageal stricture    8. Iron deficiency anemia, unspecified iron deficiency anemia type  -     CBC WITH AUTOMATED DIFF; Future  -     VITAMIN B12; Future  -     IRON PROFILE; Future  -     FERRITIN; Future    9. Bilateral carotid artery disease ([address]    10. Celiac artery stenosis, status post stent    11. Age-related osteoporosis with current pathological fracture, sequela    12. S/P percutaneous endoscopic gastrostomy (PEG) tube placement ([address]    13. History of radiation to head and neck region    14. Multiple pulmonary nodules    15. Seizures ([address]    16. Current smoker    17. Medicare annual wellness visit, subsequent    18. ACP (advance care planning)        There are no preventive care reminders to display for this patient.

## 2018-02-17 PROBLEM — S93.304S: Status: ACTIVE | Noted: 2017-01-01

## 2018-02-18 NOTE — PROGRESS NOTES
HPI:   Shasta Whitmore is a 63y.o. year old female who presents today for post hospitalization follow-up. She is accompanied by her . She has a history of squamous cell carcinoma of the oropharynx, COPD, hypertension, PAD, carotid artery disease, mesenteric ischemia, pulmonary nodules, osteoporosis, seizure disorder, and carpal tunnel syndrome. She is accompanied by her . She reports that since her last visit, she received an IV infusion of zolendronic acid by Dr. Trina Jaeger for treatment of her osteoporosis. Even though it was recognized that she was at increased risk of osteonecrosis of the jaw given her prior head/neck irradiation, it was felt that her risk of fracture was greater. She also reports that in 12/2017, she sustained a closed dislocation of the right great toe at the metatarsocuneiform joint with a comminuted intraarticular fracture of the metatarsal. On 12/12/2017, she underwent open reduction and internal fixation surgery of her right great toe by Dr. Rebecca Arevalo at [facility]. She did well postoperatively, although experienced transient episodes of hypotension although asymptomatic. She was discharged on 12/14/2017. She states that she had a cast in place until 2/5/2018, and upon removal, it was noted that she had an open wound on the dorsum of her foot 3.5 cm x 1 cm and 1.4 cm deep. She received wound care at [facility], and it was discovered that the ulcer extended down to the metal hardware that was in place. Wound cultures were positive for rare coagulase negative Staph. She is being treated with Keflex. She states that the plan is for her to have repeat surgery by Dr. Rebecca Arevalo with removal of the hardware and closure of the wound with plastic surgery. However, she states that prior to the procedure being performed, she was scheduled to have a vascular study to confirm adequate blood flow to her right foot. She states that the study is scheduled for later today. She denies any fever or chills.  She reports that she is continuing to use her morphine solution for pain. She has lost approximately eight pounds since her last visit, and reports that her appetite is poor. She was started on Megace by Dr. Shabbir Herring for her severe protein calorie malnutrition, but she developed severe itching and it was discontinued. She states that another medication was ordered, but is unable to recall the name. She reports that she is continuing to smoke approximately one pack per day. She is otherwise without complaints. She was admitted to [facility] from 4/7-4/9/2017 after tripping at home and developing right hip pain. She was found to have a non-displaced comminuted fracture of the greater trochanter of the right femur. Orthopedics was consulted and non-surgical intervention and patient was subsequently discharged with plans for home physical therapy. However, several hours after getting home, patient developed two seizure like episodes manifesting as a chewing motion with upper arm shaking. After the second episode, she became unresponsive, EMS was called and she was transported back to [facility]. While in the ED, she had multiple episodes of staring with right facial twitching, and while being evaluated by Dr. Nilo Naik of neurology, she developed a generalized tonic-clonic seizure with deviation of her eyes and head/neck to the right. She was loaded with Keppra, and the seizure was terminated. She did remain post-ictal for some time after episode. Evaluation in the ED included a stat EEG which was obtained after the seizure, and it showed interictal epileptiform discharges. Head CT scan showed atrophy and periventricular microvascular ischemia without acute changes. Chest x-ray was negative. Urine culture was positive for Klebsiella pneumoniae and she was treated with IV ceftriaxone and transitioned to po Keflex. Repeat EEG (4/10/2017) was normal without focal slowing or epileptiform activity.  She remained seizure free on Keppra, and was discharged on 4/13/2017 to [address] [place], [place] [id number] skilled nursing facility. She returned home on 4/25/2017. She was evaluated by Dr. Collette Barksdale regarding her recent generalized seizure-like episodes, and he recommended obtaining a repeat EEG and brain MRI. She had a sleep deprived EEG on 7/6/2017 which was a normal awake recording with prominent beta activity in the anterior central region suggestive of drug effect. She did not have the MRI performed due to anxiety despite premedication with valium. She reports that she is continuing to take [address] and has not had any recurrent seizures. 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 She underwent panendoscopy with biopsy, PEG tube placement and tracheostomy on 5/13/2013. She was subsequently treated with radiation and chemotherapy (Taxol and Cisplatin), completed 8/6/2013. She has had no clinical evidence of recurrent disease, with negative serial PET scans/ CT scans of the chest and neck. A follow-up PET scan was obtained in 2/4/2016 which revealed no tumor activity in the head or neck, but multifocal patchy nodular activity in the lungs (right > left), could be inflammatory although could also be consistent with metastases. A chest CT scan was obtained 2/26/16 revealing new bilateral pulmonary nodules, also consistent with inflammation vs. metastases. She had a repeat chest CT scan on 6/21/2016, which showed that all of the lung nodular densities had improved in appearance and decreased in size, with no new or enlarging pulmonary nodules or enlarged lymph nodes demonstrated.  She also had a neck CT scan (6/21/2016) which was negative for enlarged cervical lymph nodes, but there was an amorphous soft tissue density diffusely within the parapharyngeal fat, which was most likely post-therapy change rather than neoplastic recurrence since there was no discrete masslike focal accumulation of tissue. She had a repeat PET scan (1/21/2017) which showed marked interval improvement of multifocal hypermetabolic lesions since 5/5336, with only a mild hypermetabolic focus in the right pretracheal mediastinum, probably correlating to a tiny lymph node, clinically reactive. She had a repeat CT scan of the neck (7/11/2017) showing no residual or recurrent mass, and CT scan of the chest (7/11/2017) showing interval development of minimal hazy interstitial infiltrate, posterior subsegment left upper lobe since prior CT in 1/2017, but no definitive evidence of metastatic disease. She is being followed by Dr. Iman Rico [facility]) and Dr. Filiberto Webb (oncology). Following her XRT and chemotherapy treatment, the tracheostomy was successfully removed. Her course has been complicated by esophageal stricture and aspiration, requiring G tube placement. She underwent multiple endoscopic dilatations under fluoroscopy using rendezvous techniques (10/2014) with reestablishment of continuity with her upper and lower esophagus. However, it was noted that she was having significant aspiration with both solids and liquids, due to poor laryngeal mobility from epiglottic and laryngeal scarring s/p XRT. In 11/29/2015, she was found to have a posterior left upper lobe cavitary mass on chest CT scan. She was admitted to [facility] and underwent bronchoscopy and bronchoalveolar lavage, which revealed no endobronchial obstruction or nodules and pathology was negative for malignant cells. Her QuantiFERON Gold was negative for TB. Cultures were positive for MSSA and pseudomonas and she was diagnosed with a left upper lobe abscess, thought to be secondary to aspiration. She was discharged home on 12/9/2015 with a PICC line and was treated with nafcillin and meropenem. She was again hospitalized at [facility] on 12/26/2015 when she presented with melena and leakage from PEG site and was found to have a Hb of 6.1.  She was transfused and due to persistent leakage, had her G tube converted to a G-J tube by interventional radiology. She had severe hypokalemia, which required multiple IV/ per tube dosing. She underwent endoscopy by Dr. Krupa Melendez, but he was only able to pass the scope into the pharynx since the upper esophageal sphincter was completely fused shut. ENT was consulted about possibly attempting to open the proximal esophagus, and it was felt that the risk of perforation and mediastinal infection was too high, especially since she had adequete enteral access for nutrition. She was admitted to [facility] from 1/8/2016 to 1/22/2016 for a recurrent upper G-I bleed (Hb 6.8 requiring 4 U of PRBCs), acute hypoxic respiratory failure with interstitial pulmonary edema and bilateral pleural effusions, and candidemia (most likely from PICC line). She had an echocardiogram (1/15/2016) which showed normal LV size and function (EF 65%). Thoracentesis revealed effusions were transudative. She was treated with fluconazole, and had her G-J tube converted back to a G-Tube. She had been having difficulty with her G-tube cracking or becoming loose and falling out, but this resolved after she had the tube replaced with a larger tube in 2/2017 by TALHA Simon. She also has a history of peripheral vascular disease and presented in 2/2013 with weight loss and post-prandial pain. 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 Alice Camarena. She again represented with abdominal complaints in 4/2013 and was thought to have stent restenosis since she was not taking clopidogrel due to itching. In 5/2013, she underwent a celiac arteriogram which showed that the stent was patent. In 6/2015 and 6/2016, an abdominal duplex scan showed >70% stenosis of the celiac artery, but she remains asymptomatic. In 1/2018, celiac stent was found to be patent on ultrasound. In 6/21/2016, surveillance neck CT scan showed high grade bilateral internal carotid arterial stenoses. A carotid duplex scan (6/24/2016) confirmed severe (>70%) right internal carotid artery stenosis and moderate (50-69%) left internal carotid artery stenosis. On 7/20/2016, she underwent a subclavian, cerebral, vertebral, and carotid arteriogram by Dr. Hardeep Coleman, which revealed multiple areas of atherosclerosis, but no significant stenoses; the right internal carotid artery had a 50% narrowing noted while all other vessels were patent. She had a repeat carotid duplex (1/2018) which showed moderate (50-69%) stenosis bilaterally. She continues to be maintained on aspirin. She has a history of hypertension that was treated in the past with lisinopril. She no longer requires medication. She also has a history of hypothyroidism and is on Synthroid. Denies any cold intolerance, hair or skin changes. She has a history of osteopenia diagnosed in 2010, with bone density scan (3/2017) showing progression to osteoporosis with T-scores: femoral neck  left -3.0 / right -2.4, and lumbar -0.9. She was treated with alendronate in 2010 for one year, but she discontinued it due to throat irritation. She continues to take calcium and Vitamin D. She was referred to see Dr. Mickel Libman in 4/2017 for recommendations regarding treatment, given her prior head and neck irradiation and concern for possible increased risk for osteonecrosis with biphosphonate treatment. As discussed, in 11/2017, she received an IV infusion of zolendronic acid with plans for repeat yearly. She has a history of suspected COPD, with a long history of smoking 1-2 ppd. She was being treated with Flovent and albuterol-ipratropium (Duo-neb) nebulizers; however, she states that she has not needed to use these recently. She denies any shortness of breath currently, but continues to smoke at least 1 ppd. According to the chart, she has a history of hepatitis C and cirrhosis.  However, review of records show negative hepatitis B and C panels in 3/2014 at [facility], and CT scans of abdomen show a normal liver in 5/9/2015 and 11/9/2015. Chest CT scan (7/2017 showed mild diffuse decreased density liver, and mildly dilated 8 mm central biliary tree (but incompletely visualized). She had a screening colonoscopy in 1/2013 by Dr. Gloria Cruz which was normal.     She was being followed by Dr. Juan Jose Villa for complaints of daily headaches and numbness and tingling in her left hand, attributed to probable migraines. She is being treated with gabapentin and was instructed to use a wrist splint for probable carpal tunnel syndrome. Past Medical History:   Diagnosis Date    Age-related osteoporosis with current pathological fracture 5/21/2017    Body mass index (BMI) less than 16.5     13.2    Cigarette smoker     Closed avulsion fracture of greater trochanter of femur ([address] 4/7/2017    COPD (chronic obstructive pulmonary disease) ([address]     Esophageal stricture 2014    s/p XRT for oropharyngeal cancer; requiring GJ tube for nutrition.  Foot fracture, right 12/07/2017    Hypertension     Hypothyroidism     Melanoma ([address]     Mesenteric ischemia ([address]     Oropharyngeal cancer ([address] 5/2013    [id number] squamous cell carcinoma of posterion pharynx and tonsil s/p XRT and chemo.    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    s/p stent    Surgical wound infection     right foot    Vitamin D deficiency      Past Surgical History:   Procedure Laterality Date    ABDOMEN SURGERY PROC UNLISTED      \"ulcer\" surgery    HX ENDOSCOPY  5/9/2014    w/ dilation    HX ENDOSCOPY  5/13/2014    w/ dilation    HX ENDOSCOPY  5/21    with Dilatation    HX ENDOSCOPY  6/4/2014    w/ dilation    HX GI      HX ORTHOPAEDIC      intramedullary chao fixation of the right tiba    HX OTHER SURGICAL      skin cancer removal from right shoulder; PEG TUBE    HX TRACHEOSTOMY       No current facility-administered medications for this visit. No current outpatient prescriptions on file.      Facility-Administered Medications Ordered in Other Visits   Medication Dose Route Frequency    [START ON 2/18/2018] heparin (porcine) pf 50 Units  50 Units InterCATHeter DAILY    heparin (porcine) pf 50 Units  50 Units InterCATHeter PRN    piperacillin-tazobactam (ZOSYN) 3.375 g in 0.9% sodium chloride 100 mL IVPB  3.375 g IntraVENous Q8H    zinc gluconate tablet 50 mg  1 Tab Oral DAILY    lansoprazole (PREVACID SOLUTAB) disintegrating tablet 30 mg  30 mg Oral BID    sucralfate (CARAFATE) 100 mg/mL oral suspension 1 g  1 g Oral AC&HS    albuterol (PROVENTIL VENTOLIN) nebulizer solution 2.5 mg  2.5 mg Nebulization PRN    Amino Acids-Protein Hydrolys 15-60 gram-kcal/30 mL liqd 30 mL (Patient Supplied)  30 mL Oral DAILY    aspirin chewable tablet 81 mg  81 mg Oral DAILY    cholecalciferol (VITAMIN D3) tablet 2,000 Units  2,000 Units PEG Tube DAILY    cyanocobalamin (VITAMIN B12) tablet 500 mcg  500 mcg Oral DAILY    cyclobenzaprine (FLEXERIL) tablet 5 mg  5 mg Per G Tube TID PRN    gabapentin (NEURONTIN) capsule 300 mg  300 mg Per G Tube TID    levETIRAcetam (KEPPRA) oral solution 1,000 mg  1,000 mg Per G Tube BID    levothyroxine (SYNTHROID) tablet 37.5 mcg  37.5 mcg Per G Tube ACB    lidocaine HCl (XYLOCAINE) 3 % cream   Topical BID    melatonin tablet 3 mg  3 mg Per G Tube QHS PRN    morphine 10 mg/5 mL oral solution 5-10 mg  5-10 mg Oral Q4H PRN    therapeutic multivitamin-minerals (THERAGRAN-M) tablet 1 Tab  1 Tab Oral DAILY    potassium chloride (KAON 10%) 20 mEq/15 mL oral liquid 10 mEq  10 mEq Oral DAILY    dextrose 5 % - 0.45% NaCl infusion  100 mL/hr IntraVENous CONTINUOUS    acetaminophen (TYLENOL) solution 650 mg  650 mg Oral Q4H PRN    naloxone (NARCAN) injection 0.1 mg  0.1 mg IntraVENous PRN    morphine injection 4-10 mg  4-10 mg IntraVENous Q4H PRN    promethazine (PHENERGAN) injection 6.25 mg 6.25 mg IntraMUSCular PRN     Allergies and Intolerances: Allergies   Allergen Reactions    Megestrol Itching    Percocet [Oxycodone-Acetaminophen] Itching and Hives    Percodan [Oxycodone-Aspirin] Rash and Itching    Plavix [Clopidogrel] Rash and Hives     Family History: No FH of breast or colon cancer. Sister had uterine cancer. Family History   Problem Relation Age of Onset    Heart Disease Mother      Social History: She is  living with her . She has no children. She retired in 2013 from being the  at the [employer]. She  reports that she has been smoking Cigarettes. She has a 21.00 pack-year smoking history. She has never used smokeless tobacco.   History   Alcohol Use    8.4 oz/week    14 Cans of beer, 0 Standard drinks or equivalent per week     Immunization History:   Immunization History   Administered Date(s) Administered    Influenza Vaccine (Quad) PF 11/17/2015, 10/13/2016, 09/05/2017    Influenza Vaccine PF 10/21/2013, 11/04/2014    Pneumococcal Polysaccharide (PPSV-23) 10/13/2016    Tdap 03/06/2017       Review of Systems:   As above included in HPI. Otherwise 11 point review of systems negative including constitutional, skin, HENT, eyes, respiratory, cardiovascular, gastrointestinal, genitourinary, musculoskeletal, endo/heme/aller, neurological.    Physical:   Vitals:   BP: 118/60  HR: 95  WT: 72 lb (32.7 kg)  BMI:  13.91 kg/m2    Exam:   Pt appears well; alert and oriented x 3; appropriate affect. HEENT: PERRLA, anicteric, oropharynx clear, no JVD, adenopathy or thyromegaly. No carotid bruits or radiated murmur. Lungs: decreased breath sounds bilaterally, no wheezes, rhonchi, or rales. Heart: regular rate and rhythm. Tachycardic. No murmur, rubs, gallops  Abdomen: soft, nontender, nondistended, normal bowel sounds, no hepatosplenomegaly or masses. G tube in place. Extremities: Right foot and right lower leg in immobilizing boot.      Review of Data:  Labs:  Hospital Outpatient Visit on 02/05/2018   Component Date Value Ref Range Status    GRAM STAIN 02/05/2018     Final                    Value:No WBC's Seen  No Organisms Seen      Isolate 02/05/2018 *   Final                    Value:Rare  Coagulase Negative Staphylococcus      Isolate 02/05/2018 *   Final                    Value:Rare  Coagulase Negative Staphylococcus  Second Morphotype      Isolate 02/05/2018 *   Final                    Value:Rare  Coagulase Negative Staphylococcus  Third Morphotype      ANAEROBE CULTURE RESULT 02/05/2018 No Anaerobes Isolated After 5 Days    Final   Hospital Outpatient Visit on 02/05/2018   Component Date Value Ref Range Status    Vitamin D, 25-OH, Total 02/05/2018 57.0  30.0 - 100.0 ng/ml Final    Sodium 02/05/2018 138  136 - 145 mEq/L Final    Potassium 02/05/2018 4.1  3.5 - 5.1 mEq/L Final    Chloride 02/05/2018 106  98 - 107 mEq/L Final    CO2 02/05/2018 22  21 - 32 mEq/L Final    Glucose 02/05/2018 82  74 - 106 mg/dl Final    BUN 02/05/2018 4* 7 - 25 mg/dl Final    Creatinine 02/05/2018 0.6  0.6 - 1.3 mg/dl Final    GFR est AA 02/05/2018 >60.0    Final    GFR est non-AA 02/05/2018 >60    Final    Calcium 02/05/2018 8.6  8.5 - 10.1 mg/dl Final    Zinc, plasma/serum 02/05/2018 57* 60 - 130 mcg/dL Final    Magnesium 02/05/2018 1.8  1.6 - 2.6 mg/dl Final     Health Maintenance:  Screening:    Mammogram: negative (10/2017)   PAP smear: s/p PRASHANTH. No further screening. Colorectal: colonoscopy (1/2013) normal. Dr. Carlee Akhtar. Due 2023.    Depression: none   DM (HbA1c/FPG): FPG 82 (2/2018)   Hepatitis C: negative (3/2014) at [address]: none   DEXA: osteoporosis (3/2017)   Smoking: resumed smoking 1 ppd   Vitamin D: 57 (2/2018)   Medicare Wellness: today      Impression:  Patient Active Problem List   Diagnosis Code    Melanoma right scapula C43.9    COPD (chronic obstructive pulmonary disease) ([facility] J44.9    Carotid artery disease ([facility] bilateral moderate I77.9    Celiac artery stenosis, status post stent I77.4    Severe protein-calorie malnutrition (HCC) E43    Squamous cell carcinoma of oropharynx, with PEG tube and tracheostomy C10.9    Anemia D64.9    Esophageal stricture K22.2    Vitamin D deficiency E55.9    Acquired hypothyroidism E03.9    Hyponatremia E87.1    Carpal tunnel syndrome, left G56.02    GI bleed K92.2    Dysphagia, 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    S/P percutaneous endoscopic gastrostomy (PEG) tube placement ([facility]) Z93.1    History of radiation to head and neck region Z92.3    Current smoker F17.200    Essential hypertension I10    Multiple pulmonary nodules R91.8    Closed avulsion fracture of greater trochanter of femur ([facility]) S72.113A    Seizures ([facility]) R56.9    Age-related osteoporosis with current pathological fracture M80.00XA    Foot dislocation, right, sequela S93.304S    Controlled substance agreement signed Z79.899    Bone infection, ankle/foot ([facility] M86.9       Plan:  1. Right foot ulcer with exposure of hardware. Patient with right great toe dislocation and metatarsal fracture requiring ORIF. Following cast removal, noted deep ulcer with exposed hardware and bone. Poor nutritional status with severe protein caloric malnutrition contributing to poor wound healing. Also, continuing to smoke. On Keflex for probable infection. Arterial duplex vascular study today to assess adequacy of circulation to foot. Plan for surgical debridement and removal of hardware by Dr. Jorge Schwartz. Continue to follow. 2. Seizures. Unclear but concerning as presented with multiple witnessed episodes in ED thought to represent status epilepticus requiring loading with Keppra to terminate. Has had no recurrence of seizures since Keppra initiated. Head CT scan was negative, but she has been unable to have brain MRI due to anxiety. Evaluated by Dr. Kyle Washington, and repeat EEG sleep deprived normal. Continues on Keprra.      3. S/P nondisplaced fracture of right femur greater trochanter. Doing well with increasing weight bearing and ambulation. Follow. 4. Oropharyngeal carcinoma s/p XRT/chemo. Currently in remission. Recent PET scan and neck/chest CT scans with stable pulmonary nodules, and otherwise without clinical evidence of active disease. She is being followed closely by Dr. Guilherme Tsang at [facility] and Dr. Nellie Landon. 5. Esophageal stricture. Currently receiving all nutrition via G-tube. On intermittent feeds. Weight decreased eight pounds since 9/2017. Severe protein calorie malnutrition. Receiving [employer] protein supplement. Attempting to introduce pureed food via G-tube. Nutritionist advising as per Dr. Rosita Deng office   6. Osteoporosis. Significant progression of disease since prior exam in 2010, particularly in right femoral neck. Now with fracture of right proximal femur following mechanical fall. Unclear if safe to treat with biphosphonates given prior head and neck irradiation. Concern for possible increase risk of osteonecrosis with biphosphonate or denosumab use. Evaluated by Dr. Michelle Reid and risk of fracture felt to be higher than risk of osteonecrosis. Received dose of IV Reclast with plans for repeat annually. Continue calcium and Vitamin D.  7. Elevated LFT's. New elevation of AST and alkaline phosphatase level significantly increased at last visit. Review of chest CT scan in 1/2017 and 7/2017 showed hepatic steatosis and mildly dilated central biliary duct without intrahepatic ductal dilatation (but not completely visualized). Keppra also reported to cause increase in LFT's. Hepatitis panel, ferritin, iron studies, ceruloplasmin, AMA, and ASMA were all negative. GGT elevated confirming hepatic source of elevated alkaline phosphatase. Hepatic ultrasound with evidence of increased hepatic echotexture suggestive of nonspecific hepatocellular pathology such as steatosis. Will repeat today.  If continues or worsens, may need repeat imaging or consider referral to GI. Further recommendations pending results. 8. Iron deficiency anemia. Improved. Previously was secondary to g-i loss. No longer taking iron supplement. Recheck today. Follow closely. 9. Hypothyroidism. TSH with evidence of adequate replacement on current Synthroid dose. Continue to follow. 10. H/O mesenteric ischemia. 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 Being followed by Dr. Daniel Hill. Continue to follow. 11. Carotid stenoses, bilateral. Prior duplex scan revealing severe stenoses bilaterally, but angiogram in 7/2016 by Dr. Daniel Hill did not confirm this. Difficulty with duplex scan most likely due to prior neck irradiation. However, most recent duplex in 6/2017 showing only moderate stenosis. Follow. 12. COPD. Currently well controlled. On Flovent Diskus but reports not needing to use duoneb nebulizers. Followed by Dr. Mary Hazel. Continue to follow. 13. Severe protein calorie malnutrition. Recommended increasing number of cans of 2 dez HN gradually. Reports poor appetite, but given medication to help with this by Dr. Ollie Sesay. 14. Carpal tunnel syndrome/ headaches. Being followed by Dr. Navid Alvarez. On gabapentin and using a left arm splint. Follow. 15. Multiple pulmonary nodules. Stable on most recent CT scan in 1/2017. Being followed by Dr. Vero Ochoa. 16. Smoking cessation. Discussed at length with the patient, including benefits of improved lung function as well as decreased risk of cardiovascular disease and cancer. Medication and non-pharmacologic options explored.  states that he will stop with her if she is willing. Encouraged to do so. However, she appears reluctant to quantify the amount of her smoking, and is vague about whether she will attempt to stop. States will consider following her foot surgery. Total time spent on topic 5 minutes. 17. Health maintenance. Already received influenza vaccine. Received Pneumovax. Other immunizations up to date. Mammogram due in 10/2017. Vitamin D level has been normal. To continue maintenance dose supplement. Colorectal cancer screening normal in 1/2013. Follow. Total time: 40 minutes spent with the patient in face-to-face consultation of which greater than 50% was spent on counseling, answering questions and/or coordination of care. Complex medical review and management performed. Patient understands recommendations and agrees with plan. Follow-up in 3 months.

## 2018-02-26 NOTE — PROGRESS NOTES
Hospital Discharge Follow-Up      Date/Time:  2/26/2018 4:03 PM     Patient was admitted to [facility] for a scheduled admission on 2/15/18 and discharged on 2/22/18 for removal of hardware. The physician discharge summary was available at the time of outreach. Nurse Navigator(NN) contacted the patient  by telephone to perform post hospital discharge assessment within 2 business day(s) of discharge. No answer. Left message introducing self, role and reason for call. Requested return call. Contact information provided. Contacted [employer] (listed on PHI). Received recorded message indicating person being called was unavailable. Unable to leave message. Will attempt to contact at a later time. PCP/Specialist follow up: Future Appointments  Date Time Provider Sade Barajas   2/27/2018 11:00 AM Shirley Aguilar [facility] ZE SCHED   5/9/2018 11:30 AM Neymar Barrera MD [facility]   5/15/2018 10:15 AM Kevin Daily  [facility]   7/18/2018 10:00 AM [facility]E SCHED   7/31/2018 10:30 AM [facility] [address] [facility]     Coordinate Pain Management Plan for Patient.       Supportive resources in place to maintain patient in the community (ie., home health, equipment, DME, refer to, etc.)

## 2018-02-27 NOTE — PROGRESS NOTES
HPI/History  Manfred Collazo is a 63 y.o.  female who presents for hospital f/u, 2/15-2/22. Accompanied by male guest.    Pt underwent ORIF of right foot. More recently developed infection, osteomyelitis, and necrosis. Hardware removed by Dr. Baldev Osorio. PICC line placed and currently on vanco and levaquin. Wound vac on right foot. Pt developed cdiff and sepsis somewhere along the way. ID involved. Also had electrolyte/nutrient disturbance, specifically K, Mg, and phos. HH for wound vac/care on Mondays and Fridays. Goes to wound care clinic on Wednesdays, next 2/28. Next appt with Dr. Baldev Osorio (ortho) on 3/5. Next appt with Dr. Elly Reyes (ID) on 3/7. Currently, pt states she has been doing well in light of recent events and her extensive pre-existing hx. Stools still loose but they have started her on meds to thicken. No fevers. Slight cough. Denies any other developments. She is requesting refill of morphine as prescribed by Dr. Fatmata Morse.      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    Anemia D64.9    Esophageal stricture K22.2    Vitamin D deficiency E55.9    Acquired hypothyroidism E03.9    Hyponatremia E87.1    Carpal tunnel syndrome, left G56.02    GI bleed K92.2    Dysphagia, 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    S/P percutaneous endoscopic gastrostomy (PEG) tube placement ([facility]) Z93.1    History of radiation to head and neck region Z92.3    Current smoker F17.200    Essential hypertension I10    Multiple pulmonary nodules R91.8    Closed avulsion fracture of greater trochanter of femur ([facility] S72.113A    Seizures ([facility]) R56.9    Age-related osteoporosis with current pathological fracture M80.00XA    Foot dislocation, right, sequela S93.304S    Controlled substance agreement signed Z79.899    Bone infection, ankle/foot ([address] M86.9     Past Medical History:   Diagnosis Date    Age-related osteoporosis with current pathological fracture 5/21/2017    Body mass index (BMI) less than 16.5     13.2    Cigarette smoker     Closed avulsion fracture of greater trochanter of femur ([address] 4/7/2017    COPD (chronic obstructive pulmonary disease) ([address]     Esophageal stricture 2014    s/p XRT for oropharyngeal cancer; requiring GJ tube for nutrition.  Foot fracture, right 12/07/2017    Hypertension     Hypothyroidism     Melanoma ([address]     Mesenteric ischemia ([address]     Oropharyngeal cancer ([address] 5/2013    [id number] squamous cell carcinoma of posterion pharynx and tonsil s/p XRT and chemo.  Osteopenia     Panic disorder     Peptic ulcer disease     Seizures ([address] 5/21/2017    Stenosis of celiac artery ([facility] 2/2013    s/p stent    Surgical wound infection     right foot    Vitamin D deficiency      Past Surgical History:   Procedure Laterality Date    ABDOMEN SURGERY PROC UNLISTED      \"ulcer\" surgery    HX ENDOSCOPY  5/9/2014    w/ dilation    HX ENDOSCOPY  5/13/2014    w/ dilation    HX ENDOSCOPY  5/21    with Dilatation    HX ENDOSCOPY  6/4/2014    w/ dilation    HX GI      HX ORTHOPAEDIC      intramedullary chao fixation of the right tiba    HX OTHER SURGICAL      skin cancer removal from right shoulder; PEG TUBE    HX TRACHEOSTOMY       Social History     Social History    Marital status:      Spouse name: N/A    Number of children: N/A    Years of education: N/A     Occupational History    Not on file.      Social History Main Topics    Smoking status: Current Every Day Smoker     Packs/day: 0.50     Years: 42.00     Types: Cigarettes    Smokeless tobacco: Never Used    Alcohol use 8.4 oz/week     14 Cans of beer, 0 Standard drinks or equivalent per week    Drug use: No    Sexual activity: Not on file     Other Topics Concern    Not on file     Social History Narrative     Family History   Problem Relation Age of Onset    Heart Disease Mother      Current Outpatient Prescriptions   Medication Sig    banana flakes trans-galactooligosaccharide (BANATROL) powder Take 1 Packet by mouth.  vancomycin (VANCOCIN) 750 mg injection by IntraVENous route once.  dronabinol (SYNDROS) 5 mg/mL soln Take  by mouth.  morphine 10 mg/5 mL oral solution 2.5ml or 5ml by peg tube every 8 hours as needed for pain    lactobacillus rhamnosus gg 10 billion cell (CULTURELLE) 10 billion cell capsule Take 1 Cap by mouth Before breakfast and dinner.  levoFLOXacin (LEVAQUIN) 750 mg/150 mL pgbk 150 mL by IntraVENous route every twenty-four (24) hours for 37 days.  vancomycin (VANCOCIN) 125 mg capsule Take 1 Cap by mouth four (4) times daily for 42 days. Indications: Clostridium difficile infection    potassium chloride (KAON 10%) 20 mEq/15 mL solution Take 7.5 mL by mouth daily.  cyclobenzaprine (FLEXERIL) 5 mg tablet 1 Tab by Per G Tube route three (3) times daily as needed for Muscle Spasm(s).  levETIRAcetam (KEPPRA) 100 mg/ml soln oral solution 10 mL by Per G Tube route two (2) times a day. (Patient taking differently: 1,000 mg by Per G Tube route two (2) times a day. DOS)    NEBULIZER by Does Not Apply route.  zoledronic acid (RECLAST) 5 mg/100 mL pgbk 5 mg by IntraVENous route once. Once a year    levothyroxine (SYNTHROID) 75 mcg tablet 0.5 Tabs by Per G Tube route Daily (before breakfast). (Patient taking differently: 37.5 mcg by Per G Tube route Daily (before breakfast). DOS)    gabapentin (NEURONTIN) 300 mg capsule 1 Cap by Per G Tube route three (3) times daily.  amino acids-protein hydrolys (PROSOURCE NO CARB) 15-60 gram-kcal/30 mL lipk Take 30 mL by mouth daily.  melatonin 3 mg tablet 1 Tab by Per G Tube route nightly as needed.  albuterol (PROVENTIL VENTOLIN) 2.5 mg /3 mL (0.083 %) nebulizer solution 2.5 mg by Nebulization route as needed for Wheezing.  cholecalciferol, vitamin D3, (VITAMIN D3) 2,000 unit tab 1 Tab by PEG Tube route daily.  omeprazole (PRILOSEC) 2.5 mg suDR delayed release suspension 20 mg by Per G Tube route two (2) times a day.  lidocaine HCl (XYLOCAINE) 3 % topical cream Apply  to affected area two (2) times a day. Indications: WITH DESITIN    aspirin 81 mg chewable tablet 81 mg by PEG Tube route daily.  cyanocobalamin (VITAMIN B12) 500 mcg tablet 500 mcg by PEG Tube route daily. Via peg     multivitamin (THERAGRAN) liquid 10 mL by PEG Tube route daily. No current facility-administered medications for this visit. Allergies   Allergen Reactions    Megestrol Itching    Percocet [Oxycodone-Acetaminophen] Itching and Hives    Percodan [Oxycodone-Aspirin] Rash and Itching    Plavix [Clopidogrel] Rash and Hives       Review of Systems  Aside from those included in HPI, remainder of complete ROS negative. Physical Examination  Visit Vitals    /72 (BP 1 Location: Left arm, BP Patient Position: Sitting)    Pulse 72    Temp 98.1 °F (36.7 °C) (Oral)    Resp 14    Ht 5' 2\" (1.575 m)    SpO2 96%       General - Alert and in no acute distress. Pt appears chronically ill but acutely looks to be doing fairly well and is nontoxic. She is pleasant, engaging, and in good spirits and appears comfortable. Not anxious, non-diaphoretic. In wheelchair. Mental status - Appropriate mood, behavior, speech content, dress, and thought processes. Pulm - No cough today. Full, complete sentences. No tachypnea, retractions, or cyanosis. Fair respiratory effort. Grossly clear to auscultation bilat with no appreciable wheezes, rales, or rhonchi. Cardiovascular - Normal rate, regular rhythm. Right foot looks to be doing well. Wound vac in place covered with tagaderm type dressing.  Area surrounding wound appears normal, no erythema or drainage at site. Minimal edema of right foot but no discoloration, warmth, or other concerning sxs. Pulses intact. Thin legs but no other findings. Assessment and Plan  1. Pt with previous ORIF right foot and subsequent wound infection, osteomyelitis, and necrosis requiring admission - Hardware removed. PICC line and receiving vanco and levaquin. HH for wound vac/care on Mondays and Fridays. Goes to wound care clinic on Wednesdays, next 2/28. Next appt with Dr. Baldev Osorio (ortho) on 3/5. Next appt with Dr. Elly Reyes (ID) on 3/7. Pt looks to be doing well overall. Labs today. 2. Cdiff infection and sepsis at some point during admission - Receiving vanco and followed by ID. No developments. 3. K, Mg, and phos disturbances - Will check labs today. 4. Morphine refilled. Will check CBC, BMP, Mg, and phos. Further planning as warranted. Pt and guest happily agree with plan. More than 40 mins spent during visit with more than 50% discussing above issues, potential causes/contributing factors, eval/tx, results, plan, and questions. PLEASE NOTE:   This document has been produced using voice recognition software. Unrecognized errors in transcription may be present.     Kristine [employer] of [address] [place] [address]  [phone]  2/27/2018

## 2018-02-27 NOTE — TELEPHONE ENCOUNTER
Spoke with Dr. Briseida Lee. Have pt f/u with Dr. Briseida Lee in about 6wks, sooner if needed or pending the labs we prasad today.

## 2018-02-27 NOTE — MR AVS SNAPSHOT
[address] [facility] 
 
 
 [address], [address] [place] [address] [facility] 
[phone] Patient: Paulette Blakely MRN: [id number] DANNIELLE:[id number] Visit Information Date & Time Provider Department Dept. Phone Encounter #  
 2/27/2018 11:00 AM Bryant [facility] [address] [place] [address] [phone] Your Appointments 5/9/2018 11:30 AM  
Office Visit with Celia Jovel MD  
[facility] [address]) Appt Note: ov 3mos. sarris [address], [address] [place] [address] [address] [place]  
  
   
 [address], [address] 5/15/2018 10:15 AM  
Follow Up with Najma Gardiner NP [employer] [facility] ([address] [place] [address]) Appt Note: 6 mo f/u  
 [address] [address] [phone]  
  
   
 [facility] [address]  
  
    
 7/18/2018 10:00 AM  
PROCEDURE with [facility] 1 [facility] ([address] [place] [address]) Appt Note: CV KAUSHIK [address] [place], [place] [address] [address] [facility]  
[phone] [facility] [address]  
  
    
 7/31/2018 10:30 AM  
Follow Up with Mikaela Caro MD  
[address] [place] [address] and [facility] [address] [place] [address]) Appt Note: 6 MONTH FOLLOW UP AFTER CAROTID  
 [address] [place], [place] [address] [address] [facility]  
[phone] [address] [facility] [address] [facility] Upcoming Health Maintenance Date Due ZOSTER VACCINE AGE 60> 2/28/2019* Pneumococcal 19-64 Highest Risk (3 of 3 - PCV13) 2/28/2023* PAP AKA CERVICAL CYTOLOGY 11/17/2018 MEDICARE YEARLY EXAM 2/15/2019 BREAST CANCER SCRN MAMMOGRAM 10/18/2019 COLONOSCOPY 1/10/2023 DTaP/Tdap/Td series (2 - Td) 3/6/2027 *Topic was postponed. The date shown is not the original due date. Allergies as of 2/27/2018  Review Complete On: 2/27/2018 By: Juan Mcfarlane LPN Severity Noted Reaction Type Reactions Megestrol Medium 02/14/2018    Itching Percocet [Oxycodone-acetaminophen]  12/29/2013    Itching, Hives Percodan [Oxycodone-aspirin]  05/21/2014    Rash, Itching Plavix [Clopidogrel]  04/10/2013    Rash, Hives Current Immunizations  Reviewed on 10/13/2016 Name Date Influenza Vaccine (Quad) PF 9/5/2017 10:30 AM, 10/13/2016  3:26 PM, 11/17/2015  3:55 PM  
 Influenza Vaccine PF 11/4/2014, 10/21/2013  1:03 PM  
 Pneumococcal Polysaccharide (PPSV-23) 10/13/2016  3:52 PM  
 Tdap 3/6/2017 Not reviewed this visit You Were Diagnosed With   
  
 Codes Comments Wound infection    -  Primary ICD-10-CM: T14. 8XXA, L08.9 ICD-9-CM: 958.3 S/P percutaneous endoscopic gastrostomy (PEG) tube placement ([facility]     ICD-10-CM: Z93.1 ICD-9-CM: V44.1 Foot dislocation, right, initial encounter     ICD-10-CM: K19.168M ICD-9-CM: 838.00 Closed avulsion fracture of greater trochanter of right femur, sequela     ICD-10-CM: S72.111S ICD-9-CM: 905.3 Osteomyelitis of right foot, unspecified type ([facility]     ICD-10-CM: M86.9 ICD-9-CM: 730.27 Clostridium difficile infection     ICD-10-CM: B96.89 
ICD-9-CM: 041.84 Electrolyte disturbance     ICD-10-CM: E87.8 ICD-9-CM: 276.9 Vitals BP Pulse Temp Resp Height(growth percentile) SpO2  
 130/72 (BP 1 Location: Left arm, BP Patient Position: Sitting) 72 98.1 °F (36.7 °C) (Oral) 14 5' 2\" (1.575 m) 96% OB Status Smoking Status Postmenopausal Current Every Day Smoker Vitals History Preferred Pharmacy Pharmacy Name Phone [address], [address] [place] [address] [phone] Your Updated Medication List  
  
   
This list is accurate as of 2/27/18 11:35 AM.  Always use your most recent med list.  
  
  
  
  
 albuterol 2.5 mg /3 mL (0.083 %) nebulizer solution Commonly known as:  PROVENTIL VENTOLIN  
2.5 mg by Nebulization route as needed for Wheezing. aspirin 81 mg chewable tablet 81 mg by PEG Tube route daily. Banatrol powder Generic drug:  banana flakes trans-galactooligosaccharide Take 1 Packet by mouth.  
  
 cyanocobalamin 500 mcg tablet Commonly known as:  VITAMIN B12  
500 mcg by PEG Tube route daily. Via peg  
  
 cyclobenzaprine 5 mg tablet Commonly known as:  FLEXERIL  
1 Tab by Per G Tube route three (3) times daily as needed for Muscle Spasm(s). gabapentin 300 mg capsule Commonly known as:  NEURONTIN  
1 Cap by Per G Tube route three (3) times daily. lactobacillus rhamnosus gg 10 billion cell 10 billion cell capsule Commonly known as:  Victorine Mech Take 1 Cap by mouth Before breakfast and dinner. levETIRAcetam 100 mg/ml Soln oral solution Commonly known as:  KEPPRA 10 mL by Per G Tube route two (2) times a day. levoFLOXacin 750 mg/150 mL Pgbk Commonly known as:  LEVAQUIN  
150 mL by IntraVENous route every twenty-four (24) hours for 37 days. levothyroxine 75 mcg tablet Commonly known as:  SYNTHROID  
0.5 Tabs by Per G Tube route Daily (before breakfast). lidocaine HCl 3 % topical cream  
Commonly known as:  XYLOCAINE Apply  to affected area two (2) times a day. Indications: WITH DESITIN  
  
 melatonin 3 mg tablet 1 Tab by Per G Tube route nightly as needed. morphine 10 mg/5 mL oral solution 2.5ml or 5ml by peg tube every 8 hours as needed for pain  
  
 multivitamin liquid Commonly known as:  THERAGRAN  
10 mL by PEG Tube route daily. NEBULIZER  
by Does Not Apply route. omeprazole 2.5 mg Sudr delayed release suspension Commonly known as:  PRILOSEC  
20 mg by Per G Tube route two (2) times a day. potassium chloride 20 mEq/15 mL solution Commonly known as:  KAON 10% Take 7.5 mL by mouth daily. PROSOURCE NO CARB 15-60 gram-kcal/30 mL Lipk Generic drug:  amino acids-protein hydrolys Take 30 mL by mouth daily. SYNDROS 5 mg/mL Soln Generic drug:  dronabinol Take  by mouth. * vancomycin 750 mg injection Commonly known as:  VANCOCIN  
by IntraVENous route once. * vancomycin 125 mg capsule Commonly known as:  [employer] Take 1 Cap by mouth four (4) times daily for 42 days. Indications: Clostridium difficile infection  
  
 vancomycin 500 mg 500 mg IVPB 500 mg by IntraVENous route every twelve (12) hours every twelve (12) hours for 35 days. VITAMIN D3 2,000 unit Tab Generic drug:  cholecalciferol (vitamin D3) 1 Tab by PEG Tube route daily. zoledronic acid 5 mg/100 mL Pgbk Commonly known as:  RECLAST 5 mg by IntraVENous route once. Once a year * Notice: This list has 2 medication(s) that are the same as other medications prescribed for you. Read the directions carefully, and ask your doctor or other care provider to review them with you. Prescriptions Printed Refills  
 morphine 10 mg/5 mL oral solution 0 Si.5ml or 5ml by peg tube every 8 hours as needed for pain  
 Class: Print To-Do List   
 2018 Lab:  CBC WITH AUTOMATED DIFF   
  
 2018 Lab:  MAGNESIUM   
  
 2018 Lab:  METABOLIC PANEL, BASIC   
  
 2018 Lab:  PHOSPHORUS   
  
 2018 3:15 PM  
(Arrive by 2:45 PM) Appointment with Geraldine Baker 1 at [facility] ([phone]) Please arrive 30 minutes to appointment to register Please provide this summary of care documentation to your next provider. Your primary care clinician is listed as Joseph England. If you have any questions after today's visit, please call [phone].

## 2018-02-28 NOTE — TELEPHONE ENCOUNTER
WBC within normal ranges. Anemia stable to slightly improved. BMP normal.  K, Mg, and phos within normal ranges. Looking pretty good. Keep various appts with specialists/other as discussed yesterday. Schedule f/u with Dr. Alec Luis in ~6 weeks, sooner if needed.

## 2018-03-06 NOTE — PROGRESS NOTES
Complex Case Management  Weekly Follow-Up    Date/Time:  3/6/2018 11:51 AM     NN contacted patient for Complex Case Management  follow up. Spoke to , Horace Pino. Introduced self/role and reason for call.  reported: pain to right foot, still having diarrhea but taking medicine for it, wound care clinic every Wednesday, wound vac changed by [facility] on Mondays and Fridays, \"picked up a small cold\" (taking OTC Robitussin), PEG tube patent, applying Desitin to irritation around stoma    Pertinent negatives: N/V, chills, fever, wheezing, SOB, CP, foul drainage    Medication:   Medications reconciliation during this outreach:no; last reconciled 2/28.  reports there have been no changes to medications since last OV. New medications since last outreach: no  Does patient need refills on any medications: no  Medication changes since last outreach (dose adjustments or discontinued meds): no    Home Health company: [facility]  Date of initial visit: unknown  Date of discharge: TBD    Barriers to care? No apparent barriers to care noted at this time. Specialist appointments since last outreach? yes  If so, specialist and date: Dr. Shankar     verbalized he is administering IV antibiotics thru PICC line (VIKKI). Was able to verbalize SASH. Denies s/s of infection to PICC lineHusband reported patient was seen by Dr. Shankar recently and viewed pictures taken at wound care.  verbalized Dr. Shankar and wound care seem to be pleased with the healing.  reminded that there are physicians on call 24 hours a day / 7 days a week (M-F 5pm to 8am and from Friday 5pm until Monday 8a for the weekend) should the patient have questions or concerns.      Future Appointments  Date Time Provider Sade Barajas   3/7/2018 3:00 PM [facility] WOUND RM 1 [facility]   4/17/2018 1:30 PM Linda Ford MD [facility] [address]   5/9/2018 11:30 AM Linda Ford MD [facility] [address]   5/15/2018 10:15 AM Raf Baker  [address]   7/18/2018 10:00 AM [facility] ZE SCHED   7/31/2018 10:30 AM MD Venus Wasserman [address]        Other upcoming appointments:  Sienna Dewitt, infectious disease on 3/15/18    Goals        Post ED     Coordinate Pain Management Plan for Patient.  Supportive resources in place to maintain patient in the community (ie., home health, equipment, DME, refer to, etc.)         Contacted [facility]. Spoke with Claudia Pritchett. Introduced self/role and reason for call. Provided 2 patient identifiers. Chase verbalized she will contact Darrel, patient home care nurse and have her to return call to office. Writer provided direct contact information.

## 2018-03-08 NOTE — TELEPHONE ENCOUNTER
Last Visit: 02/27/2018 with TALHA Kim    Next Appointment: 04/17/2018 with MD Guaman     Requested Prescriptions     Pending Prescriptions Disp Refills    albuterol (PROVENTIL VENTOLIN) 2.5 mg /3 mL (0.083 %) nebulizer solution 24 Each 0     Sig: 3 mL by Nebulization route as needed for Wheezing.

## 2018-03-19 NOTE — TELEPHONE ENCOUNTER
Last Visit: 2018 with TALHA Kim    Next Appointment: 2018 with MD Elizabeth Grace   Previous Refill Encounters: 2018 per MD Elizabeth Grace #25 with 1 refill    Requested Prescriptions     Pending Prescriptions Disp Refills    cyclobenzaprine (FLEXERIL) 5 mg tablet 25 Tab 1     Si Tab by Per G Tube route three (3) times daily as needed for Muscle Spasm(s).

## 2018-03-29 NOTE — PROGRESS NOTES
Complex Case Management  Follow-Up    Date/Time:  3/29/2018 3:19 PM     NN contacted patient for Complex Case Management  follow up. Contacted patient for transition of care follow up. No answer at home. Left message introducing self, role and reason for call. Requested return call. Contact information provided. Called spouse's mobile number. Number invalid. Will attempt to contact at a later time.

## 2018-04-03 NOTE — TELEPHONE ENCOUNTER
Reviewed report generated by the [facility]. Does not demonstrate aberrancies or inconsistencies with regard to the prescribing of controlled medications to this patient by other providers. Last filled Morphine 2/27/2018 per . Please explain that needed to prescribe naloxone with morphine prescription due to new regulations.

## 2018-04-03 NOTE — TELEPHONE ENCOUNTER
Pt's spouse called, needs RX ASAP, wants to  by 2:00 if possible. Please call him, Kwame Arenas at [phone] when ready to pick-up.

## 2018-04-23 NOTE — PROGRESS NOTES
Complex Case Management  Follow-Up    Date/Time:  4/23/2018 9:28 AM     NN contacted patient for Complex Case Management  follow up. No answer. Left message introducing self, role and reason for call. Requested return call. Contact information provided. Called mobile number for emergency contact on file and received message \"the caller you are trying to reach is not accepting calls at this time\". Will attempt to contact at a later time. Specialist appointments since last outreach? yes  If so, specialist and date: [address] [place] [address] wound care 4/17/18; outpatient wound care once a week. Future Appointments  Date Time Provider Sade Barajas   4/24/2018 9:30 AM [address] [place] [address] WOUND RM 2 [facility] [address] [place] [address]   5/9/2018 11:30 AM Lei Schwartz MD [facility] ZE SCHED   5/24/2018 11:30 AM Robin Singh, NP [address]   7/18/2018 10:00 AM [facility] ZE SCHED   7/31/2018 10:30 AM Shelvy Angelucci, MD [address]        Other upcoming appointments:  N/A    Goals        Post ED     Coordinate Pain Management Plan for Patient.       Supportive resources in place to maintain patient in the community (ie., home health, equipment, DME, refer to, etc.)

## 2018-04-30 NOTE — PROGRESS NOTES
Patient has graduated from the Complex Case Management  program on 4/30/18. Patient's symptoms are stable at this time. Patient/family has the ability to self-manage. Care management goals have been completed at this time. No further nurse navigator follow up scheduled. Goals Addressed             Most Recent       Post ED     COMPLETED: Coordinate Pain Management Plan for Patient.  Supportive resources in place to maintain patient in the community (ie., home health, equipment, DME, refer to, etc.)   On track (4/30/2018)             4/30/18: Patient continues to attend outpatient wound care at [facility]). Pt has nurse navigator's contact information for any further questions, concerns, or needs.   Patients upcoming visits:    Future Appointments  Date Time Provider Sade Barajas   5/1/2018 8:45  Michelle Olivia 2 [facility]   5/9/2018 11:30 AM Carlso Park MD [facility] [address]   5/24/2018 11:30 AM Londa Dakins,  E [facility]   7/18/2018 10:00 AM [facility]   7/31/2018 10:30 AM Raleigh Corbett MD [address]

## 2018-05-09 NOTE — PROGRESS NOTES
Chief Complaint   Patient presents with    Foot Injury     3 month follow up. Patient is also taking Fluconazole 200 mg tablet one a day for 14 days and Sulfamethoxazole TMP LINN two times a day for 14 days that just has started today. 1. Have you been to the ER, urgent care clinic or hospitalized since your last visit? NO.     2. Have you seen or consulted any other health care providers outside of the [address] [place] [address] since your last visit (Include any pap smears or colon screening)? NO      Do you have an Advanced Directive? NO    Would you like information on Advanced Directives?  NO

## 2018-05-09 NOTE — MR AVS SNAPSHOT
[address] [facility] [address] 
 
 
 [address], [address] [facility] [address] 
[phone] Patient: Genevieve Mireles MRN: [id number] Visit Information Date & Time Provider Department Dept. Phone Encounter #  
 5/9/2018 11:30 AM Luli King MD [facility] [address] [place] [address] [phone] [id number] Follow-up Instructions Return in about 3 months (around 8/9/2018), or if symptoms worsen or fail to improve. Your Appointments 5/24/2018 11:30 AM  
Follow Up with Earl Schilder, NP [facility] ([facility]) Appt Note: 6 mo f/u; pt r/s to this date & time [address] [facility] [id number]  
[phone]  
  
   
 [place] [id number]  
  
    
 7/18/2018 10:00 AM  
PROCEDURE with [facility] ([facility]) Appt Note: CV [address] [place], [place] [address] [address] [facility] [address]  
[phone] [address]  
  
    
 7/31/2018 10:30 AM  
Follow Up with Lily Bronson MD  
[address] [place] [address] and [facility]) Appt Note: 6 MONTH FOLLOW UP AFTER CAROTID  
 [address] [place], [place] [address] [address] [facility] [address]  
[phone] [address]  
  
    
 8/15/2018  9:45 AM  
LAB with [facility] NURSE VISIT [facility] [address] [place] [address] ([facility]) Appt Note: labs [address], [address] Mony [place] [address] [place]  
  
   
 [address], [address] [place] [address]  
  
    
 8/22/2018 11:00 AM  
Office Visit with Luli King MD  
[facility] Appt Note: ov per sarris [address], [address] Christianna [place] [address]  
  
   
 [address] [facility], [address] [place] [address] Upcoming [facility] Date Due ZOSTER VACCINE AGE 60> 2/28/2019* Pneumococcal 19-64 Highest Risk (3 of 3 - PCV13) 2/28/2023* Influenza Age 5 to Adult 8/1/2018 PAP AKA CERVICAL CYTOLOGY 11/17/2018 MEDICARE YEARLY EXAM 2/15/2019 BREAST CANCER SCRN MAMMOGRAM 10/18/2019 COLONOSCOPY 1/10/2023 DTaP/Tdap/Td series (2 - Td) 3/6/2027 *Topic was postponed. The date shown is not the original due date. Allergies as of 5/9/2018  Review Complete On: 5/9/2018 By: Jeff Byrd Severity Noted Reaction Type Reactions Megestrol Medium 02/14/2018    Itching Percocet [Oxycodone-acetaminophen]  12/29/2013    Itching, Hives Percodan [Oxycodone-aspirin]  05/21/2014    Rash, Itching Plavix [Clopidogrel]  04/10/2013    Rash, Hives Current Immunizations  Reviewed on 10/13/2016 Name Date Influenza Vaccine (Quad) PF 9/5/2017 10:30 AM, 10/13/2016  3:26 PM, 11/17/2015  3:55 PM  
 Influenza Vaccine PF 11/4/2014, 10/21/2013  1:03 PM  
 Pneumococcal Polysaccharide (PPSV-23) 10/13/2016  3:52 PM  
 Tdap 3/6/2017 Not reviewed this visit You Were Diagnosed With   
  
 Codes Comments S/P percutaneous endoscopic gastrostomy (PEG) tube [facility] ([facility]     ICD-10-CM: Z93.1 ICD-9-CM: V44.1 Vitals BP Pulse Temp Resp Height(growth percentile) SpO2  
 130/80 (BP 1 Location: Right arm, BP Patient Position: Sitting) (!) 59 97.9 °F (36.6 °C) (Oral) 14 5' 2\" (1.575 m) 90% OB Status Smoking Status Postmenopausal Current Every Day Smoker Vitals History Preferred Pharmacy Pharmacy Name Phone [address] [place] [address], [address] [place] [address] [phone] Your Updated Medication List  
  
   
This list is accurate as of 5/9/18 12:27 PM.  Always use your most recent med list.  
  
  
  
  
 albuterol 2.5 mg /3 mL (0.083 %) nebulizer solution Commonly known as:  PROVENTIL VENTOLIN  
 3 mL by Nebulization route every four (4) hours as needed for Wheezing. aspirin 81 mg chewable tablet 81 mg by PEG Tube route daily. Banatrol powder Generic drug:  banana flakes trans-galactooligosaccharide Take 1 Packet by mouth.  
  
 cyanocobalamin 500 mcg tablet Commonly known as:  VITAMIN B12  
500 mcg by PEG Tube route daily. Via peg  
  
 cyclobenzaprine 5 mg tablet Commonly known as:  FLEXERIL  
1 Tab by Per G Tube route three (3) times daily as needed for Muscle Spasm(s). gabapentin 300 mg capsule Commonly known as:  NEURONTIN  
1 Cap by Per G Tube route three (3) times daily. lactobacillus rhamnosus gg 10 billion cell 10 billion cell capsule Commonly known as:  TruLeaf Take 1 Cap by mouth Before breakfast and dinner. levETIRAcetam 100 mg/ml Soln oral solution Commonly known as:  KEPPRA 10 mL by Per G Tube route two (2) times a day. levothyroxine 75 mcg tablet Commonly known as:  SYNTHROID  
0.5 Tabs by Per G Tube route Daily (before breakfast). lidocaine HCl 3 % topical cream  
Commonly known as:  XYLOCAINE Apply  to affected area two (2) times a day. Indications: WITH DESITIN  
  
 melatonin 3 mg tablet 1 Tab by Per G Tube route nightly as needed. morphine 10 mg/5 mL oral solution 2.5ml or 5ml by peg tube every 8 hours as needed for pain  
  
 multivitamin liquid Commonly known as:  THERAGRAN  
10 mL by PEG Tube route daily. naloxone 2 mg/actuation Spry Use 1 spray intranasally into 1 nostril. Use a new Narcan nasal spray for subsequent doses and administer into alternating nostrils. May repeat every 2 to 3 minutes as needed. NEBULIZER  
by Does Not Apply route. omeprazole 2.5 mg Sudr delayed release suspension Commonly known as:  PRILOSEC  
20 mg by Per G Tube route two (2) times a day. potassium chloride 20 mEq/15 mL solution Commonly known as:  KAON 10% Take 7.5 mL by mouth daily. PROSOURCE NO CARB 15-60 gram-kcal/30 mL Lipk Generic drug:  amino acids-protein hydrolys Take 30 mL by mouth daily. SYNDROS 5 mg/mL Soln Generic drug:  dronabinol Take  by mouth.  
  
 vancomycin 750 mg injection Commonly known as:  VANCOCIN  
by IntraVENous route once. VITAMIN D3 2,000 unit Tab Generic drug:  cholecalciferol (vitamin D3) 1 Tab by PEG Tube route daily. zoledronic acid 5 mg/100 mL Pgbk Commonly known as:  RECLAST 5 mg by IntraVENous route once. Once a year Prescriptions Sent to Pharmacy Refills  
 cyclobenzaprine (FLEXERIL) 5 mg tablet 1 Si Tab by Per G Tube route three (3) times daily as needed for Muscle Spasm(s). Class: Normal  
 Pharmacy: [id number] [place] [address], [address] [facility] #: [phone] Route: Per G Tube Follow-up Instructions Return in about 3 months (around 2018), or if symptoms worsen or fail to improve. Patient Instructions Learning About Benefits From Quitting Smoking How does quitting smoking make you healthier? If you're thinking about quitting smoking, you may have a few reasons to be smoke-free. Your health may be one of them. · When you quit smoking, you lower your risks for cancer, lung disease, heart attack, stroke, blood vessel disease, and blindness from macular degeneration. · When you're smoke-free, you get sick less often, and you heal faster. You are less likely to get colds, flu, bronchitis, and pneumonia. · As a nonsmoker, you may find that your mood is better and you are less stressed. When and how will you feel healthier? Quitting has real health benefits that start from day 1 of being smoke-free. And the longer you stay smoke-free, the healthier you get and the better you feel. The first hours · After just 20 minutes, your blood pressure and heart rate go down. That means there's less stress on your heart and blood vessels. · Within 12 hours, the level of carbon monoxide in your blood drops back to normal. That makes room for more oxygen. With more oxygen in your body, you may notice that you have more energy than when you smoked. After 2 weeks · Your lungs start to work better. · Your risk of heart attack starts to drop. After 1 month · When your lungs are clear, you cough less and breathe deeper, so it's easier to be active. · Your sense of taste and smell return. That means you can enjoy food more than you have since you started smoking. Over the years · After 1 year, your risk of heart disease is half what it would be if you kept smoking. · After 5 years, your risk of stroke starts to shrink. Within a few years after that, it's about the same as if you'd never smoked. · After 10 years, your risk of dying from lung cancer is cut by about half. And your risk for many other types of cancer is lower too. How would quitting help others in your life? When you quit smoking, you improve the health of everyone who now breathes in your smoke. · Their heart, lung, and cancer risks drop, much like yours. · They are sick less. For babies and small children, living smoke-free means they're less likely to have ear infections, pneumonia, and bronchitis. · If you're a woman who is or will be pregnant someday, quitting smoking means a healthier . · Children who are close to you are less likely to become adult smokers. Where can you learn more? Go to http://dayron-raj.info/. Enter 052 [id number] 72 11 in the search box to learn more about \"Learning About Benefits From Quitting Smoking. \" Current as of: 2017 Content Version: 11.4 © 9855-2480 [employer]. Care instructions adapted under license by [employer] (which disclaims liability or warranty for this information).  If you have questions about a medical condition or this instruction, always ask your healthcare professional. [address] any warranty or liability for your use of this information. Stopping Smoking: Care Instructions Your Care Instructions Cigarette smokers crave the nicotine in cigarettes. Giving it up is much harder than simply changing a habit. Your body has to stop craving the nicotine. It is hard to quit, but you can do it. There are many tools that people use to quit smoking. You may find that combining tools works best for you. There are several steps to quitting. First you get ready to quit. Then you get support to help you. After that, you learn new skills and behaviors to become a nonsmoker. For many people, a necessary step is getting and using medicine. Your doctor will help you set up the plan that best meets your needs. You may want to attend a smoking cessation program to help you quit smoking. When you choose a program, look for one that has proven success. Ask your doctor for ideas. You will greatly increase your chances of success if you take medicine as well as get counseling or join a cessation program. 
Some of the changes you feel when you first quit tobacco are uncomfortable. Your body will miss the nicotine at first, and you may feel short-tempered and grumpy. You may have trouble sleeping or concentrating. Medicine can help you deal with these symptoms. You may struggle with changing your smoking habits and rituals. The last step is the tricky one: Be prepared for the smoking urge to continue for a time. This is a lot to deal with, but keep at it. You will feel better. Follow-up care is a key part of your treatment and safety. Be sure to make and go to all appointments, and call your doctor if you are having problems. It's also a good idea to know your test results and keep a list of the medicines you take. How can you care for yourself at home? · Ask your family, friends, and coworkers for support. You have a better chance of quitting if you have help and support. · Join a support group, such as Nicotine Anonymous, for people who are trying to quit smoking. · Consider signing up for a smoking cessation program, such as the American Lung Association's Freedom from Smoking program. 
· Set a quit date. Pick your date carefully so that it is not right in the middle of a big deadline or stressful time. Once you quit, do not even take a puff. Get rid of all ashtrays and lighters after your last cigarette. Clean your house and your clothes so that they do not smell of smoke. · Learn how to be a nonsmoker. Think about ways you can avoid those things that make you reach for a cigarette. ¨ Avoid situations that put you at greatest risk for smoking. For some people, it is hard to have a drink with friends without smoking. For others, they might skip a coffee break with coworkers who smoke. ¨ Change your daily routine. Take a different route to work or eat a meal in a different place. · Cut down on stress. Calm yourself or release tension by doing an activity you enjoy, such as reading a book, taking a hot bath, or gardening. · Talk to your doctor or pharmacist about nicotine replacement therapy, which replaces the nicotine in your body. You still get nicotine but you do not use tobacco. Nicotine replacement products help you slowly reduce the amount of nicotine you need. These products come in several forms, many of them available over-the-counter: ¨ Nicotine patches ¨ Nicotine gum and lozenges ¨ Nicotine inhaler · Ask your doctor about bupropion (Wellbutrin) or varenicline (Chantix), which are prescription medicines. They do not contain nicotine. They help you by reducing withdrawal symptoms, such as stress and anxiety. · Some people find hypnosis, acupuncture, and massage helpful for ending the smoking habit. · Eat a healthy diet and get regular exercise. Having healthy habits will help your body move past its craving for nicotine. · Be prepared to keep trying. Most people are not successful the first few times they try to quit. Do not get mad at yourself if you smoke again. Make a list of things you learned and think about when you want to try again, such as next week, next month, or next year. Where can you learn more? Go to http://dayron-raj.info/. Enter G921 in the search box to learn more about \"Stopping Smoking: Care Instructions. \" Current as of: March 20, 2017 Content Version: 11.4 © 0525-4873 [employer]. Care instructions adapted under license by [employer] (which disclaims liability or warranty for this information). If you have questions about a medical condition or this instruction, always ask your healthcare professional. [address] any warranty or liability for your use of this information. Please provide this summary of care documentation to your next provider. Your primary care clinician is listed as Bud Canales. If you have any questions after today's visit, please call [phone].

## 2018-05-09 NOTE — PATIENT INSTRUCTIONS
Learning About Benefits From Quitting Smoking  How does quitting smoking make you healthier? If you're thinking about quitting smoking, you may have a few reasons to be smoke-free. Your health may be one of them. · When you quit smoking, you lower your risks for cancer, lung disease, heart attack, stroke, blood vessel disease, and blindness from macular degeneration. · When you're smoke-free, you get sick less often, and you heal faster. You are less likely to get colds, flu, bronchitis, and pneumonia. · As a nonsmoker, you may find that your mood is better and you are less stressed. When and how will you feel healthier? Quitting has real health benefits that start from day 1 of being smoke-free. And the longer you stay smoke-free, the healthier you get and the better you feel. The first hours  · After just 20 minutes, your blood pressure and heart rate go down. That means there's less stress on your heart and blood vessels. · Within 12 hours, the level of carbon monoxide in your blood drops back to normal. That makes room for more oxygen. With more oxygen in your body, you may notice that you have more energy than when you smoked. After 2 weeks  · Your lungs start to work better. · Your risk of heart attack starts to drop. After 1 month  · When your lungs are clear, you cough less and breathe deeper, so it's easier to be active. · Your sense of taste and smell return. That means you can enjoy food more than you have since you started smoking. Over the years  · After 1 year, your risk of heart disease is half what it would be if you kept smoking. · After 5 years, your risk of stroke starts to shrink. Within a few years after that, it's about the same as if you'd never smoked. · After 10 years, your risk of dying from lung cancer is cut by about half. And your risk for many other types of cancer is lower too. How would quitting help others in your life?   When you quit smoking, you improve the health of everyone who now breathes in your smoke. · Their heart, lung, and cancer risks drop, much like yours. · They are sick less. For babies and small children, living smoke-free means they're less likely to have ear infections, pneumonia, and bronchitis. · If you're a woman who is or will be pregnant someday, quitting smoking means a healthier . · Children who are close to you are less likely to become adult smokers. Where can you learn more? Go to http://dayron-raj.info/. Enter [id number] [id number] [id number] in the search box to learn more about \"Learning About Benefits From Quitting Smoking. \"  Current as of: 2017  Content Version: 11.4  © 0683-4173 [employer]. Care instructions adapted under license by [employer] (which disclaims liability or warranty for this information). If you have questions about a medical condition or this instruction, always ask your healthcare professional. [place] [address] any warranty or liability for your use of this information. Stopping Smoking: Care Instructions  Your Care Instructions    Cigarette smokers crave the nicotine in cigarettes. Giving it up is much harder than simply changing a habit. Your body has to stop craving the nicotine. It is hard to quit, but you can do it. There are many tools that people use to quit smoking. You may find that combining tools works best for you. There are several steps to quitting. First you get ready to quit. Then you get support to help you. After that, you learn new skills and behaviors to become a nonsmoker. For many people, a necessary step is getting and using medicine. Your doctor will help you set up the plan that best meets your needs. You may want to attend a smoking cessation program to help you quit smoking. When you choose a program, look for one that has proven success. Ask your doctor for ideas.  You will greatly increase your chances of success if you take medicine as well as get counseling or join a cessation program.  Some of the changes you feel when you first quit tobacco are uncomfortable. Your body will miss the nicotine at first, and you may feel short-tempered and grumpy. You may have trouble sleeping or concentrating. Medicine can help you deal with these symptoms. You may struggle with changing your smoking habits and rituals. The last step is the tricky one: Be prepared for the smoking urge to continue for a time. This is a lot to deal with, but keep at it. You will feel better. Follow-up care is a key part of your treatment and safety. Be sure to make and go to all appointments, and call your doctor if you are having problems. It's also a good idea to know your test results and keep a list of the medicines you take. How can you care for yourself at home? · Ask your family, friends, and coworkers for support. You have a better chance of quitting if you have help and support. · Join a support group, such as Nicotine Anonymous, for people who are trying to quit smoking. · Consider signing up for a smoking cessation program, such as the [employer] Association's Freedom from Smoking program.  · Set a quit date. Pick your date carefully so that it is not right in the middle of a big deadline or stressful time. Once you quit, do not even take a puff. Get rid of all ashtrays and lighters after your last cigarette. Clean your house and your clothes so that they do not smell of smoke. · Learn how to be a nonsmoker. Think about ways you can avoid those things that make you reach for a cigarette. ¨ Avoid situations that put you at greatest risk for smoking. For some people, it is hard to have a drink with friends without smoking. For others, they might skip a coffee break with coworkers who smoke. ¨ Change your daily routine. Take a different route to work or eat a meal in a different place. · Cut down on stress.  Calm yourself or release tension by doing an activity you enjoy, such as reading a book, taking a hot bath, or gardening. · Talk to your doctor or pharmacist about nicotine replacement therapy, which replaces the nicotine in your body. You still get nicotine but you do not use tobacco. Nicotine replacement products help you slowly reduce the amount of nicotine you need. These products come in several forms, many of them available over-the-counter:  ¨ Nicotine patches  ¨ Nicotine gum and lozenges  ¨ Nicotine inhaler  · Ask your doctor about bupropion (Wellbutrin) or varenicline (Chantix), which are prescription medicines. They do not contain nicotine. They help you by reducing withdrawal symptoms, such as stress and anxiety. · Some people find hypnosis, acupuncture, and massage helpful for ending the smoking habit. · Eat a healthy diet and get regular exercise. Having healthy habits will help your body move past its craving for nicotine. · Be prepared to keep trying. Most people are not successful the first few times they try to quit. Do not get mad at yourself if you smoke again. Make a list of things you learned and think about when you want to try again, such as next week, next month, or next year. Where can you learn more? Go to http://dayron-raj.info/. Enter L178 in the search box to learn more about \"Stopping Smoking: Care Instructions. \"  Current as of: March 20, 2017  Content Version: 11.4  © 1352-7917 [employer]. Care instructions adapted under license by [employer] (which disclaims liability or warranty for this information). If you have questions about a medical condition or this instruction, always ask your healthcare professional. [place] [address] any warranty or liability for your use of this information.

## 2018-05-14 PROBLEM — R74.8 ELEVATED ALKALINE PHOSPHATASE LEVEL: Status: ACTIVE | Noted: 2018-01-01

## 2018-05-14 NOTE — TELEPHONE ENCOUNTER
Spoke with . Wife reports noticing lightheadedness and shakiness as she tried to titrate up dose of Wellbutrin. Prescribed to help with smoking cessation. Discussed options. Patient not wishing to continue on Wellbutrin due to side effects. Recommended using nicotine patches and encouraged to attempt to stop smoking. Patient agreeable.

## 2018-05-14 NOTE — PROGRESS NOTES
HPI:   Malgorzata Orr is a [address] year old female who presents today for post hospitalization follow-up. She is accompanied by her . She has a history of squamous cell carcinoma of the oropharynx, COPD, hypertension, PAD, carotid artery disease, mesenteric ischemia, pulmonary nodules, osteoporosis, seizure disorder, and carpal tunnel syndrome. She is accompanied by her . She reports that she continues to have a wound vac in place on her right foot due to poor wound healing, and is followed weakly in the wound care clinic. She has completed IV Levaquin and vancomycin. Wound culture (4/17/2018) showed two morphotypes of coag. negative Staph and few Candida albicans, and she was started on Bactrim and fluconazole by LILIA[facility] ID). She remains unable to weight bear as instructed by Dr. Niles Strong, and continues to follow with Dr. Luis Enrique Peterson of plastic surgery. She reports that she is continuing to use her morphine solution for her pain. She reports that her appetite continues to be poor. She was started on Megace by Dr. Ilya Fowler for her severe protein calorie malnutrition, but she developed severe itching and it was discontinued. She reports that she is continuing to smoke approximately one pack per day. She is otherwise without complaints. In 12/2017, she sustained a closed dislocation of the right great toe at the metatarsocuneiform joint with a comminuted intraarticular fracture of the metatarsal. On 12/12/2017, she underwent open reduction and internal fixation surgery of her right great toe by Dr. Niles Strong at [facility]. She did well postoperatively, although experienced transient episodes of hypotension although asymptomatic. She was discharged on 12/14/2017. She states that she had a cast in place until 2/5/2018, and upon removal, it was noted that she had an open wound on the dorsum of her foot 3.5 cm x 1 cm and 1.4 cm deep.  She received wound care at [facility], and it was discovered that the ulcer extended down to the metal hardware that was in place. Wound cultures were positive for rare coagulase negative Staph treated with Keflex. She was hospitalized from 2/15-2/22/2018 and underwent hardware removal by Dr. Eva No. She was found to have underlying osteomyelitis and bone necrosis and debridement was performed. Hospital course was complicated by C.diff colitis with sepsis requiring pressor support. She was treated with po vancomycin with good response. Total treatment recommended for 6 weeks. She also had severe electrolyte disturbances, thought to be secondary to severe diarrhea and poor nutritional status, specifically K, Mg, and phosphorus, requiring supplementation. A PICC line was placed for 6 weeks of IV Levaquin and vancomycin (completed Levaquin 3/29/18 and vancomycin 4/1/2018). A wound vac was also placed to help with healing. She was admitted to [facility] from 4/7-4/9/2017 after tripping at home and developing right hip pain. She was found to have a non-displaced comminuted fracture of the greater trochanter of the right femur. Orthopedics was consulted and non-surgical intervention and patient was subsequently discharged with plans for home physical therapy. However, several hours after getting home, patient developed two seizure like episodes manifesting as a chewing motion with upper arm shaking. After the second episode, she became unresponsive, EMS was called and she was transported back to [facility]. While in the ED, she had multiple episodes of staring with right facial twitching, and while being evaluated by Dr. Saturnino Palmer of neurology, she developed a generalized tonic-clonic seizure with deviation of her eyes and head/neck to the right. She was loaded with Keppra, and the seizure was terminated. She did remain post-ictal for some time after episode. Evaluation in the ED included a stat EEG which was obtained after the seizure, and it showed interictal epileptiform discharges.  Head CT scan showed atrophy and periventricular microvascular ischemia without acute changes. Chest x-ray was negative. Urine culture was positive for Klebsiella pneumoniae and she was treated with IV ceftriaxone and transitioned to po Keflex. Repeat EEG (4/10/2017) was normal without focal slowing or epileptiform activity. She remained seizure free on Keppra, and was discharged on 4/13/2017 to [address] [place], [place] [address] skilled nursing facility. She returned home on 4/25/2017. She was evaluated by Dr. Dawna Nuno regarding her recent generalized seizure-like episodes, and he recommended obtaining a repeat EEG and brain MRI. She had a sleep deprived EEG on 7/6/2017 which was a normal awake recording with prominent beta activity in the anterior central region suggestive of drug effect. She did not have the MRI performed due to anxiety despite premedication with valium. She reports that she is continuing to take [address] and has not had any recurrent seizures. 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 She underwent panendoscopy with biopsy, PEG tube placement and tracheostomy on 5/13/2013. She was subsequently treated with radiation and chemotherapy (Taxol and Cisplatin), completed 8/6/2013. She has had no clinical evidence of recurrent disease, with negative serial PET scans/ CT scans of the chest and neck. A follow-up PET scan was obtained in 2/4/2016 which revealed no tumor activity in the head or neck, but multifocal patchy nodular activity in the lungs (right > left), could be inflammatory although could also be consistent with metastases. A chest CT scan was obtained 2/26/16 revealing new bilateral pulmonary nodules, also consistent with inflammation vs. metastases.  She had a repeat chest CT scan on 6/21/2016, which showed that all of the lung nodular densities had improved in appearance and decreased in size, with no new or enlarging pulmonary nodules or enlarged lymph nodes demonstrated. She also had a neck CT scan (6/21/2016) which was negative for enlarged cervical lymph nodes, but there was an amorphous soft tissue density diffusely within the parapharyngeal fat, which was most likely post-therapy change rather than neoplastic recurrence since there was no discrete masslike focal accumulation of tissue. She had a repeat PET scan (1/21/2017) which showed marked interval improvement of multifocal hypermetabolic lesions since 1/8864, with only a mild hypermetabolic focus in the right pretracheal mediastinum, probably correlating to a tiny lymph node, clinically reactive. She had a repeat CT scan of the neck (7/11/2017) showing no residual or recurrent mass, and CT scan of the chest (7/11/2017) showing interval development of minimal hazy interstitial infiltrate, posterior subsegment left upper lobe since prior CT in 1/2017, but no definitive evidence of metastatic disease. She is being followed by Dr. Savanna [facility]) and Dr. Heather aLmb (oncology). Following her XRT and chemotherapy treatment, the tracheostomy was successfully removed. Her course has been complicated by esophageal stricture and aspiration, requiring G tube placement. She underwent multiple endoscopic dilatations under fluoroscopy using rendezvous techniques (10/2014) with reestablishment of continuity with her upper and lower esophagus. However, it was noted that she was having significant aspiration with both solids and liquids, due to poor laryngeal mobility from epiglottic and laryngeal scarring s/p XRT. In 11/29/2015, she was found to have a posterior left upper lobe cavitary mass on chest CT scan. She was admitted to [facility] and underwent bronchoscopy and bronchoalveolar lavage, which revealed no endobronchial obstruction or nodules and pathology was negative for malignant cells. Her QuantiFERON Gold was negative for TB.  Cultures were positive for MSSA and pseudomonas and she was diagnosed with a left upper lobe abscess, thought to be secondary to aspiration. She was discharged home on 12/9/2015 with a PICC line and was treated with nafcillin and meropenem. She was again hospitalized at [facility] on 12/26/2015 when she presented with melena and leakage from PEG site and was found to have a Hb of 6.1. She was transfused and due to persistent leakage, had her G tube converted to a G-J tube by interventional radiology. She had severe hypokalemia, which required multiple IV/ per tube dosing. She underwent endoscopy by Dr. Briana Bustillos, but he was only able to pass the scope into the pharynx since the upper esophageal sphincter was completely fused shut. ENT was consulted about possibly attempting to open the proximal esophagus, and it was felt that the risk of perforation and mediastinal infection was too high, especially since she had adequete enteral access for nutrition. She was admitted to [facility] from 1/8/2016 to 1/22/2016 for a recurrent upper G-I bleed (Hb 6.8 requiring 4 U of PRBCs), acute hypoxic respiratory failure with interstitial pulmonary edema and bilateral pleural effusions, and candidemia (most likely from PICC line). She had an echocardiogram (1/15/2016) which showed normal LV size and function (EF 65%). Thoracentesis revealed effusions were transudative. She was treated with fluconazole, and had her G-J tube converted back to a G-Tube. She had been having difficulty with her G-tube cracking or becoming loose and falling out, but this resolved after she had the tube replaced with a larger tube in 2/2017 by TALHA Simon. She also has a history of peripheral vascular disease and presented in 2/2013 with weight loss and post-prandial pain. 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 Keturah Garcia. She again represented with abdominal complaints in 4/2013 and was thought to have stent restenosis since she was not taking clopidogrel due to itching.  In 5/2013, she underwent a celiac arteriogram which showed that the stent was patent. In 6/2015 and 6/2016, an abdominal duplex scan showed >70% stenosis of the celiac artery, but she remains asymptomatic. In 1/2018, celiac stent was found to be patent on ultrasound. In 6/21/2016, surveillance neck CT scan showed high grade bilateral internal carotid arterial stenoses. A carotid duplex scan (6/24/2016) confirmed severe (>70%) right internal carotid artery stenosis and moderate (50-69%) left internal carotid artery stenosis. On 7/20/2016, she underwent a subclavian, cerebral, vertebral, and carotid arteriogram by Dr. Adair Petty, which revealed multiple areas of atherosclerosis, but no significant stenoses; the right internal carotid artery had a 50% narrowing noted while all other vessels were patent. She had a repeat carotid duplex (1/2018) which showed moderate (50-69%) stenosis bilaterally. She continues to be maintained on aspirin. She has a history of hypertension that was treated in the past with lisinopril. She no longer requires medication. She also has a history of hypothyroidism and is on Synthroid. Denies any cold intolerance, hair or skin changes. She has a history of osteopenia diagnosed in 2010, with bone density scan (3/2017) showing progression to osteoporosis with T-scores: femoral neck  left -3.0 / right -2.4, and lumbar -0.9. She was treated with alendronate in 2010 for one year, but she discontinued it due to throat irritation. She continues to take calcium and Vitamin D. She was referred to see Dr. Tono Penny in 4/2017 for recommendations regarding treatment, given her prior head and neck irradiation and concern for possible increased risk for osteonecrosis with biphosphonate treatment. In 11/2017, she received an IV infusion of zolendronic acid with plans for repeat yearly.  Even though it was recognized that she was at increased risk of osteonecrosis of the jaw given her prior head/neck irradiation, it was felt that her risk of fracture was greater. She has a history of suspected COPD, with a long history of smoking 1-2 ppd. She was being treated with Flovent and albuterol-ipratropium (Duo-neb) nebulizers; however, she states that she has not needed to use these recently. She denies any shortness of breath currently, but continues to smoke at least 1 ppd. According to the chart, she has a history of hepatitis C and cirrhosis. However, review of records show negative hepatitis B and C panels in 3/2014 at [facility], and CT scans of abdomen show a normal liver in 5/9/2015 and 11/9/2015. Chest CT scan (7/2017 showed mild diffuse decreased density liver, and mildly dilated 8 mm central biliary tree (but incompletely visualized). She had a screening colonoscopy in 1/2013 by Dr. Rosalind Hand which was normal.     She was being followed by Dr. Simon Curry for complaints of daily headaches and numbness and tingling in her left hand, attributed to probable migraines. She is being treated with gabapentin and was instructed to use a wrist splint for probable carpal tunnel syndrome. Past Medical History:   Diagnosis Date    Age-related osteoporosis with current pathological fracture 5/21/2017    Body mass index (BMI) less than 16.5     13.2    Cigarette smoker     Closed avulsion fracture of greater trochanter of femur ([address] 4/7/2017    COPD (chronic obstructive pulmonary disease) ([address]     Esophageal stricture 2014    s/p XRT for oropharyngeal cancer; requiring GJ tube for nutrition.  Foot fracture, right 12/07/2017    Hypertension     Hypothyroidism     Melanoma ([address]     Mesenteric ischemia ([address]     Oropharyngeal cancer ([address] 5/2013    [id number] squamous cell carcinoma of posterion pharynx and tonsil s/p XRT and chemo.    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    s/p stent    Surgical wound infection     right foot  Vitamin D deficiency      Past Surgical History:   Procedure Laterality Date    ABDOMEN SURGERY PROC UNLISTED      \"ulcer\" surgery    HX ENDOSCOPY  5/9/2014    w/ dilation    HX ENDOSCOPY  5/13/2014    w/ dilation    HX ENDOSCOPY  5/21    with Dilatation    HX ENDOSCOPY  6/4/2014    w/ dilation    HX GI      HX ORTHOPAEDIC      intramedullary chao fixation of the right tiba    HX OTHER SURGICAL      skin cancer removal from right shoulder; PEG TUBE    HX OTHER SURGICAL Right 02/16/2018    right foot surgery    HX TRACHEOSTOMY       Current Outpatient Prescriptions   Medication Sig    cyclobenzaprine (FLEXERIL) 5 mg tablet 1 Tab by Per G Tube route three (3) times daily as needed for Muscle Spasm(s).  buPROPion (WELLBUTRIN) 100 mg tablet Take 100 mg per tube on day 1, 100 mg bid on day 2, and 100 mg tid on day 3, and continue at this dose. Indications: Smoking Cessation    morphine 10 mg/5 mL oral solution 2.5ml or 5ml by peg tube every 8 hours as needed for pain    albuterol (PROVENTIL VENTOLIN) 2.5 mg /3 mL (0.083 %) nebulizer solution 3 mL by Nebulization route every four (4) hours as needed for Wheezing.  potassium chloride (KAON 10%) 20 mEq/15 mL solution Take 7.5 mL by mouth daily.  levETIRAcetam (KEPPRA) 100 mg/ml soln oral solution 10 mL by Per G Tube route two (2) times a day. (Patient taking differently: 1,000 mg by Per G Tube route two (2) times a day. DOS)    NEBULIZER by Does Not Apply route.  levothyroxine (SYNTHROID) 75 mcg tablet 0.5 Tabs by Per G Tube route Daily (before breakfast). (Patient taking differently: 37.5 mcg by Per G Tube route Daily (before breakfast). DOS)    gabapentin (NEURONTIN) 300 mg capsule 1 Cap by Per G Tube route three (3) times daily.  amino acids-protein hydrolys (PROSOURCE NO CARB) 15-60 gram-kcal/30 mL lipk Take 30 mL by mouth daily.  melatonin 3 mg tablet 1 Tab by Per G Tube route nightly as needed.  (Patient taking differently: 10 mg by Per G Tube route nightly as needed.)    cholecalciferol, vitamin D3, (VITAMIN D3) 2,000 unit tab 1 Tab by PEG Tube route daily.  omeprazole (PRILOSEC) 2.5 mg suDR delayed release suspension 20 mg by Per G Tube route two (2) times a day.  lidocaine HCl (XYLOCAINE) 3 % topical cream Apply  to affected area two (2) times a day. Indications: WITH DESITIN    aspirin 81 mg chewable tablet 81 mg by PEG Tube route daily.  cyanocobalamin (VITAMIN B12) 500 mcg tablet 500 mcg by PEG Tube route daily. Via peg     naloxone 2 mg/actuation spry Use 1 spray intranasally into 1 nostril. Use a new Narcan nasal spray for subsequent doses and administer into alternating nostrils. May repeat every 2 to 3 minutes as needed.  banana flakes trans-galactooligosaccharide (BANATROL) powder Take 1 Packet by mouth.  dronabinol (SYNDROS) 5 mg/mL soln Take  by mouth.  lactobacillus rhamnosus gg 10 billion cell (CULTURELLE) 10 billion cell capsule Take 1 Cap by mouth Before breakfast and dinner.  zoledronic acid (RECLAST) 5 mg/100 mL pgbk 5 mg by IntraVENous route once. Once a year    multivitamin (THERAGRAN) liquid 10 mL by PEG Tube route daily. No current facility-administered medications for this visit. Allergies and Intolerances: Allergies   Allergen Reactions    Megestrol Itching    Percocet [Oxycodone-Acetaminophen] Itching and Hives    Percodan [Oxycodone-Aspirin] Rash and Itching    Plavix [Clopidogrel] Rash and Hives     Family History: No FH of breast or colon cancer. Sister had uterine cancer. Family History   Problem Relation Age of Onset    Heart Disease Mother      Social History: She is  living with her . She has no children. She retired in 2013 from being the  at the [employer]. She  reports that she has been smoking Cigarettes. She has a 21.00 pack-year smoking history.  She has never used smokeless tobacco.   History   Alcohol Use    8.4 oz/week    14 Cans of beer, 0 Standard drinks or equivalent per week     Immunization History:   Immunization History   Administered Date(s) Administered    Influenza Vaccine (Quad) PF 11/17/2015, 10/13/2016, 09/05/2017    Influenza Vaccine PF 10/21/2013, 11/04/2014    Pneumococcal Polysaccharide (PPSV-23) 10/13/2016    Tdap 03/06/2017       Review of Systems:   As above included in HPI. Otherwise 11 point review of systems negative including constitutional, skin, HENT, eyes, respiratory, cardiovascular, gastrointestinal, genitourinary, musculoskeletal, endo/heme/aller, neurological.    Physical:   Vitals:   BP: 130/80  HR: (!) 59  WT:  not performed  BMI:   not performed    Exam:   Pt appears well; alert and oriented x 3; appropriate affect. HEENT: PERRLA, anicteric, oropharynx clear, no JVD, adenopathy or thyromegaly. No carotid bruits or radiated murmur. Lungs: decreased breath sounds bilaterally, no wheezes, rhonchi, or rales. Heart: regular rate and rhythm. Tachycardic. No murmur, rubs, gallops  Abdomen: soft, nontender, nondistended, normal bowel sounds, no hepatosplenomegaly or masses. G tube in place. Extremities: Right foot with wound vac in place. Dressing clean and dry.      Review of Data:  Labs:  Hospital Outpatient Visit on 05/08/2018   Component Date Value Ref Range Status    WBC 05/08/2018 6.3  4.0 - 11.0 1000/mm3 Final    RBC 05/08/2018 4.04  3.60 - 5.20 M/uL Final    HGB 05/08/2018 11.7* 13.0 - 17.2 gm/dl Final    HCT 05/08/2018 35.7* 37.0 - 50.0 % Final    MCV 05/08/2018 88.4  80.0 - 98.0 fL Final    MCH 05/08/2018 29.0  25.4 - 34.6 pg Final    MCHC 05/08/2018 32.8  30.0 - 36.0 gm/dl Final    PLATELET 66/79/1380 339  140 - 450 1000/mm3 Final    MPV 05/08/2018 10.8* 6.0 - 10.0 fL Final    RDW-SD 05/08/2018 49.8* 36.4 - 46.3   Final    NRBC 05/08/2018 0  0 - 0   Final    IMMATURE GRANULOCYTES 05/08/2018 0.6  0.0 - 3.0 % Final    NEUTROPHILS 05/08/2018 79.9* 34 - 64 % Final    LYMPHOCYTES 05/08/2018 5.4* 28 - 48 % Final    MONOCYTES 05/08/2018 10.7  1 - 13 % Final    EOSINOPHILS 05/08/2018 0.6  0 - 5 % Final    BASOPHILS 05/08/2018 2.8  0 - 3 % Final    Sodium 05/08/2018 137  136 - 145 mEq/L Final    Potassium 05/08/2018 4.2  3.5 - 5.1 mEq/L Final    Chloride 05/08/2018 104  98 - 107 mEq/L Final    CO2 05/08/2018 21  21 - 32 mEq/L Final    Glucose 05/08/2018 78  74 - 106 mg/dl Final    BUN 05/08/2018 5* 7 - 25 mg/dl Final    Creatinine 05/08/2018 0.7  0.6 - 1.3 mg/dl Final    GFR est AA 05/08/2018 >60.0    Final    GFR est non-AA 05/08/2018 >60    Final    Calcium 05/08/2018 9.2  8.5 - 10.1 mg/dl Final    AST (SGOT) 05/08/2018 52* 15 - 37 U/L Final    ALT (SGPT) 05/08/2018 27  12 - 78 U/L Final    Alk. phosphatase 05/08/2018 277* 45 - 117 U/L Final    Bilirubin, total 05/08/2018 0.8  0.2 - 1.0 mg/dl Final    Protein, total 05/08/2018 7.5  6.4 - 8.2 gm/dl Final    Albumin 05/08/2018 2.9* 3.4 - 5.0 gm/dl Final    C-Reactive protein 05/08/2018 <2.9  0.0 - 2.9 mg/L Final    Sed rate (ESR) 05/08/2018 14  0 - 30 mm/Hr Final   Hospital Outpatient Visit on 04/17/2018   Component Date Value Ref Range Status    GRAM STAIN 04/17/2018     Final                    Value:Rare WBC'S  No Organisms Seen      Isolate 04/17/2018 *   Final                    Value:Rare  Coagulase Negative Staphylococcus      Isolate 04/17/2018 *   Final                    Value:Rare  Coagulase Negative Staphylococcus  Second Morphotype      Isolate 04/17/2018 *   Final                    Value:Few  Candida albicans       Health Maintenance:  Screening:    Mammogram: negative (10/2017)   PAP smear: s/p PRASHANTH. No further screening. Colorectal: colonoscopy (1/2013) normal. Dr. Pleasant Closs. Due 2023. Depression: none   DM (HbA1c/FPG): FPG 82 (2/2018)   Hepatitis C: negative (3/2014) at [address]: none   DEXA: osteoporosis (3/2017). IV Reclast given 11/2017.    Smoking: resumed smoking 1 ppd   Vitamin D: 57 (2/2018)   Medicare Wellness: 2/14/2018      Impression:  Patient Active Problem List   Diagnosis Code    Melanoma right scapula C43.9    COPD (chronic obstructive pulmonary disease) ([facility] J44.9    Carotid artery disease ([facility] bilateral moderate I77.9    Celiac artery stenosis, status post stent I77.4    Severe protein-calorie malnutrition ([facility] E43    Squamous cell carcinoma of oropharynx, with PEG tube and tracheostomy C10.9    Anemia D64.9    Esophageal stricture K22.2    Vitamin D deficiency E55.9    Acquired hypothyroidism E03.9    Hyponatremia E87.1    Carpal tunnel syndrome, left G56.02    GI bleed K92.2    Dysphagia, 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    S/P percutaneous endoscopic gastrostomy (PEG) tube placement ([facility]) Z93.1    History of radiation to head and neck region Z92.3    Current smoker F17.200    Essential hypertension I10    Multiple pulmonary nodules R91.8    Closed avulsion fracture of greater trochanter of femur ([facility] S72.113A    Seizures ([facility]) R56.9    Age-related osteoporosis with current pathological fracture M80.00XA    Foot dislocation, right, sequela S93.304S    Controlled substance agreement signed Z79.899    Bone infection, ankle/foot ([facility] M86.9       Plan:  1. Right foot osteomyelitis and bone necrosis, poor wound healing. Patient with right great toe dislocation and metatarsal fracture requiring ORIF. Following cast removal, noted deep ulcer with exposed hardware and bone. Removal of hardware revealed underlying osteomyelitis and bone necrosis. Received IV Levaquin and vancomycin for 6 weeks. Repeat wound culture with coag negative staph and candida albicans. Now taking Bactrim and fluconazole for 14 days. Poor nutritional status with severe protein caloric malnutrition contributing to poor wound healing. Also, continuing to smoke. Being followed by Infectious disease, ortho, and plastic surgery at [address] [place] [address]. Being followed weekly in wound clinic.  Wound vac remains in place. Continue to follow. 2. Seizures. Unclear but concerning as presented with multiple witnessed episodes in ED thought to represent status epilepticus requiring loading with Keppra to terminate. Has had no recurrence of seizures since Keppra initiated. Head CT scan was negative, but she has been unable to have brain MRI due to anxiety. Evaluated by Dr. Nusrat Cotto, and repeat EEG sleep deprived normal. Continues on Keprra. 3. S/P nondisplaced fracture of right femur, greater trochanter. No further difficulty, but non weight bearing due to foot. Follow. 4. Oropharyngeal carcinoma s/p XRT/chemo. Currently in remission. Recent PET scan and neck/chest CT scans with stable pulmonary nodules, and otherwise without clinical evidence of active disease. She is being followed closely by Dr. Izzy Marie at [facility] and Dr. Shavon Mancilla. 5. Esophageal stricture. Currently receiving all nutrition via G-tube. On intermittent feeds. Weight decreased eight pounds since 9/2017. Severe protein calorie malnutrition. Receiving ProSource protein supplement. Attempting to introduce pureed food via G-tube. Nutritionist advising as per Dr. Jose Castañeda office   6. Osteoporosis. Significant progression of disease since prior exam in 2010, particularly in right femoral neck. Now with fracture of right proximal femur following mechanical fall. Unclear if safe to treat with biphosphonates given prior head and neck irradiation. Concern for possible increase risk of osteonecrosis with biphosphonate or denosumab use. Evaluated by Dr. Cale Valdez and risk of fracture felt to be higher than risk of osteonecrosis. Received dose of IV Reclast with plans for repeat annually. Continue calcium and Vitamin D.  7. Elevated LFT's. Alkaline phosphatase level remains significantly increased, but improved and may be due to chronic osteomyelitis. Transaminases have normalized. Will obtain repeat LFT's with GGT prior to next visit.   Review of chest CT scan in 1/2017 and 7/2017 showed hepatic steatosis and mildly dilated central biliary duct without intrahepatic ductal dilatation (but not completely visualized). Keppra also reported to cause increase in LFT's. Hepatitis panel, ferritin, iron studies, ceruloplasmin, AMA, and ASMA were all negative. GGT elevated confirming hepatic source of elevated alkaline phosphatase. Hepatic ultrasound with evidence of increased hepatic echotexture suggestive of nonspecific hepatocellular pathology such as steatosis. Follow. 8. Iron deficiency anemia. Improved. Previously was secondary to g-i loss. No longer taking iron supplement. Iron profile consistent with chronic disease, likely related to osteomyelitis. Follow closely. W  9. Hypothyroidism. TSH with evidence of adequate replacement on current Synthroid dose. Will recheck prior to next visit. Continue to follow. 10. H/O mesenteric ischemia. 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 Being followed by Dr. Agus Parada. Continue to follow. 11. Carotid stenoses, bilateral. Prior duplex scan revealing severe stenoses bilaterally, but angiogram in 7/2016 by Dr. Agus Parada did not confirm this. Difficulty with duplex scan most likely due to prior neck irradiation. However, most recent duplex in 6/2017 showing only moderate stenosis. Follow. 12. COPD. Currently well controlled. On Flovent Diskus but reports not needing to use Duoneb nebulizers. Followed by Dr. Stef Sena. Continue to follow. 13. Severe protein calorie malnutrition. Recommended increasing number of cans of 2 dez HN gradually. Reports poor appetite, but given medication to help with this by Dr. Scooby Lopez. 14. Carpal tunnel syndrome/ headaches. Being followed by Dr. Jennifer Macias. On gabapentin and using a left arm splint. Follow. 15. Multiple pulmonary nodules. Stable on most recent CT scan in 1/2017. Being followed by Dr. Zahra Robertson. 16. Smoking cessation.  Discussed at length with the patient, including benefits of improved lung function as well as decreased risk of cardiovascular disease and cancer. Medication and non-pharmacologic options explored.  states that he has stopped, and is encouraging her to do so. However, she appears willing today. Will try Wellbutrin to see if can help decrease desire. Instructions given and script for liquid sent to pharmacy. Will follow-up at next visit. Total time spent on topic 5 minutes. 17. Health maintenance. Already received influenza vaccine. Received Pneumovax. Other immunizations up to date. Mammogram up to date. Vitamin D level has been normal. To continue maintenance dose supplement. Colorectal cancer screening normal in 1/2013. Follow. Patient not able to weight bear due to foot wound, but concerning progression of physical debilitation. Will ask physical therapy to evaluate and treat. Total time: 40 minutes spent with the patient in face-to-face consultation of which greater than 50% was spent on counseling, answering questions and/or coordination of care. Complex medical review and management performed. Patient understands recommendations and agrees with plan. Follow-up in 3 months.

## 2018-05-14 NOTE — TELEPHONE ENCOUNTER
Per  pt was given new medication on 5/9/2018 dupropion HCL - Friday pt started getting dizzy, shaking. They stopped the med and the signs all went away. Wanted Dr. Kristy Juárez to know and see what she would advise? They will be out of the house til 1pm. Please call after that time.

## 2018-05-24 NOTE — MR AVS SNAPSHOT
[address] [place] [id number] 
[phone] Patient: Rasta Patricio MRN: [id number] ATT:[id number] Visit Information Date & Time Provider Department Dept. Phone Encounter #  
 5/24/2018 11:30 AM Juan Morrisaga, [address] [phone] [id number] Follow-up Instructions Return for March 2019. Your Appointments 7/18/2018 10:00 AM  
PROCEDURE with [facility] 1 [facility] ([address] [place] [address]) Appt Note: CV KAUSHIK [address] [place], [place] [address] [address] [facility] [address]  
[phone] [address] [facility] [email or web address] [place] [address] [place] [address]  
  
    
 7/31/2018 10:30 AM  
Follow Up with Janet Babb MD  
[address] [place] [address] and [facility] [address] [place] [address]) Appt Note: 6 MONTH FOLLOW UP AFTER CAROTID  
 [address] [place], [place] [address] [address] [facility] [address]  
[phone] [address] [facility] [email or web address] [place] [address] [place] [address]  
  
    
 8/15/2018  9:45 AM  
LAB with [facility] NURSE VISIT [facility] ([address] [place] [address]) Appt Note: labs [address], [facility] [address] [address]  
  
   
 [address] [facility], [facility]  
  
    
 8/22/2018 11:00 AM  
Office Visit with Cy Patino MD  
[facility] [address] Appt Note: ov per sarris [address] [facility], [facility] [address] [address] [place]  
  
   
 [address] [facility], [facility] Upcoming Health Maintenance Date Due ZOSTER VACCINE AGE 60> 2/28/2019* Pneumococcal 19-64 Highest Risk (3 of 3 - PCV13) 2/28/2023* Influenza Age 5 to Adult 8/1/2018 PAP AKA CERVICAL CYTOLOGY 11/17/2018 MEDICARE YEARLY EXAM 2/15/2019 BREAST CANCER SCRN MAMMOGRAM 10/18/2019 COLONOSCOPY 1/10/2023 DTaP/Tdap/Td series (2 - Td) 3/6/2027 *Topic was postponed. The date shown is not the original due date. Allergies as of 5/24/2018  Review Complete On: 5/24/2018 By: Taz Underwood LPN Severity Noted Reaction Type Reactions Megestrol Medium 02/14/2018    Itching Percocet [Oxycodone-acetaminophen]  12/29/2013    Itching, Hives Percodan [Oxycodone-aspirin]  05/21/2014    Rash, Itching Plavix [Clopidogrel]  04/10/2013    Rash, Hives Current Immunizations  Reviewed on 10/13/2016 Name Date Influenza Vaccine (Quad) PF 9/5/2017 10:30 AM, 10/13/2016  3:26 PM, 11/17/2015  3:55 PM  
 Influenza Vaccine PF 11/4/2014, 10/21/2013  1:03 PM  
 Pneumococcal Polysaccharide (PPSV-23) 10/13/2016  3:52 PM  
 Tdap 3/6/2017 Not reviewed this visit Vitals BP Pulse Resp SpO2 OB Status Smoking Status 100/60 95 18 99% Postmenopausal Current Every Day Smoker Preferred Pharmacy Pharmacy Name Phone [address] [place] [address] [phone] Your Updated Medication List  
  
   
This list is accurate as of 5/24/18 11:50 AM.  Always use your most recent med list.  
  
  
  
  
 albuterol 2.5 mg /3 mL (0.083 %) nebulizer solution Commonly known as:  PROVENTIL VENTOLIN  
3 mL by Nebulization route every four (4) hours as needed for Wheezing. aspirin 81 mg chewable tablet 81 mg by PEG Tube route daily. Banatrol powder Generic drug:  banana flakes trans-galactooligosaccharide Take 1 Packet by mouth. buPROPion 100 mg tablet Commonly known as:  [facility] Take 100 mg per tube on day 1, 100 mg bid on day 2, and 100 mg tid on day 3, and continue at this dose. Indications: Smoking Cessation  
  
 cyanocobalamin 500 mcg tablet Commonly known as:  VITAMIN B12  
500 mcg by PEG Tube route daily. Via peg  
  
 cyclobenzaprine 5 mg tablet Commonly known as:  FLEXERIL  
1 Tab by Per G Tube route three (3) times daily as needed for Muscle Spasm(s). gabapentin 300 mg capsule Commonly known as:  NEURONTIN  
1 Cap by Per G Tube route three (3) times daily. lactobacillus rhamnosus gg 10 billion cell 10 billion cell capsule Commonly known as:  Lukas Ranulfo Take 1 Cap by mouth Before breakfast and dinner. levETIRAcetam 100 mg/ml Soln oral solution Commonly known as:  KEPPRA 10 mL by Per G Tube route two (2) times a day. levothyroxine 75 mcg tablet Commonly known as:  SYNTHROID  
0.5 Tabs by Per G Tube route Daily (before breakfast). lidocaine HCl 3 % topical cream  
Commonly known as:  XYLOCAINE Apply  to affected area two (2) times a day. Indications: WITH DESITIN  
  
 melatonin 3 mg tablet 1 Tab by Per G Tube route nightly as needed. morphine 10 mg/5 mL oral solution 2.5ml or 5ml by peg tube every 8 hours as needed for pain  
  
 multivitamin liquid Commonly known as:  THERAGRAN  
10 mL by PEG Tube route daily. naloxone 2 mg/actuation Spry Use 1 spray intranasally into 1 nostril. Use a new Narcan nasal spray for subsequent doses and administer into alternating nostrils. May repeat every 2 to 3 minutes as needed. NEBULIZER  
by Does Not Apply route. omeprazole 2.5 mg Sudr delayed release suspension Commonly known as:  PRILOSEC  
20 mg by Per G Tube route two (2) times a day. potassium chloride 20 mEq/15 mL solution Commonly known as:  KAON 10% Take 7.5 mL by mouth daily. PROSOURCE NO CARB 15-60 gram-kcal/30 mL Lipk Generic drug:  amino acids-protein hydrolys Take 30 mL by mouth daily. SYNDROS 5 mg/mL Soln Generic drug:  dronabinol Take  by mouth. VITAMIN D3 2,000 unit Tab Generic drug:  cholecalciferol (vitamin D3) 1 Tab by PEG Tube route daily. zoledronic acid 5 mg/100 mL Pgbk Commonly known as:  RECLAST 5 mg by IntraVENous route once. Once a year Prescriptions Printed Refills levETIRAcetam (KEPPRA) 100 mg/ml soln oral solution 11 Sig: 10 mL by Per G Tube route two (2) times a day. Class: Print Route: Per G Tube Follow-up Instructions Return for March 2019. To-Do List   
 05/29/2018 9:30 AM  
(Arrive by 9:00 AM) Appointment with [address] at [address] ([phone]) Please arrive 30 minutes to appointment to register Patient Instructions We will maintain current dose of Keppra. Refills provided. Please call office with any concerns. Please provide this summary of care documentation to your next provider. Your primary care clinician is listed as Hill Britt. If you have any questions after today's visit, please call [phone].

## 2018-05-24 NOTE — PROGRESS NOTES
[facility]  [address] [facility], [address], [place], [address]  [address] [place] Daniel [place], [address]  Office:  [phone]  Fax: [phone]  Chief Complaint   Patient presents with    Neurologic Problem     f/u seizures. Patients  states that patient has had one seizure since last visit. This is a 79-year-old female who presents for follow-up appointment. Her  accompanies her in office today. He says that she has had one seizure since her last visit in the past 6 months. He said this was 2 weeks ago. He said he came into the living room and she was slumped over on the couch. He said that her right hand looked like it was tense. She denies biting her tongue or losing her water during this event. She denies recalling this event. He said that she has missed several evening doses of Keppra. She has been maintained on Keppra thousand milligrams twice daily. Has been dealing with significant infection to the right foot due to fracture that she suffered in December and then the hardware became infected and had to be removed several months later. She follows up with wound care regarding this. She has home health coming out to help address the wound. She endorses that this has been quite a stressful time for her. Denies any other seizure activity in the past 6 months. Past Medical History:   Diagnosis Date    Age-related osteoporosis with current pathological fracture 5/21/2017    Body mass index (BMI) less than 16.5     13.2    Cigarette smoker     Closed avulsion fracture of greater trochanter of femur ([address] 4/7/2017    COPD (chronic obstructive pulmonary disease) ([address]     Esophageal stricture 2014    s/p XRT for oropharyngeal cancer; requiring GJ tube for nutrition.    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  Osteopenia     Panic disorder     Peptic ulcer disease     Seizures ([facility] 5/21/2017    Stenosis of celiac artery ([facility] 2/2013    s/p stent    Surgical wound infection     right foot    Vitamin D deficiency        Past Surgical History:   Procedure Laterality Date    ABDOMEN SURGERY PROC UNLISTED      \"ulcer\" surgery    HX ENDOSCOPY  5/9/2014    w/ dilation    HX ENDOSCOPY  5/13/2014    w/ dilation    HX ENDOSCOPY  5/21    with Dilatation    HX ENDOSCOPY  6/4/2014    w/ dilation    HX GI      HX ORTHOPAEDIC      intramedullary chao fixation of the right tiba    HX OTHER SURGICAL      skin cancer removal from right shoulder; PEG TUBE    HX OTHER SURGICAL Right 02/16/2018    right foot surgery    HX TRACHEOSTOMY         Current Outpatient Prescriptions   Medication Sig Dispense Refill    levETIRAcetam (KEPPRA) 100 mg/ml soln oral solution 10 mL by Per G Tube route two (2) times a day. 600 mL 11    cyclobenzaprine (FLEXERIL) 5 mg tablet 1 Tab by Per G Tube route three (3) times daily as needed for Muscle Spasm(s). 25 Tab 1    buPROPion (WELLBUTRIN) 100 mg tablet Take 100 mg per tube on day 1, 100 mg bid on day 2, and 100 mg tid on day 3, and continue at this dose. Indications: Smoking Cessation 270 Tab 1    morphine 10 mg/5 mL oral solution 2.5ml or 5ml by peg tube every 8 hours as needed for pain 100 mL 0    naloxone 2 mg/actuation spry Use 1 spray intranasally into 1 nostril. Use a new Narcan nasal spray for subsequent doses and administer into alternating nostrils. May repeat every 2 to 3 minutes as needed. 4 Each 0    albuterol (PROVENTIL VENTOLIN) 2.5 mg /3 mL (0.083 %) nebulizer solution 3 mL by Nebulization route every four (4) hours as needed for Wheezing. 24 Each 0    banana flakes trans-galactooligosaccharide (BANATROL) powder Take 1 Packet by mouth.       lactobacillus rhamnosus gg 10 billion cell (CULTURELLE) 10 billion cell capsule Take 1 Cap by mouth Before breakfast and dinner. 60 Cap 1    potassium chloride (KAON 10%) 20 mEq/15 mL solution Take 7.5 mL by mouth daily. 480 mL 1    NEBULIZER by Does Not Apply route.  levothyroxine (SYNTHROID) 75 mcg tablet 0.5 Tabs by Per G Tube route Daily (before breakfast). (Patient taking differently: 37.5 mcg by Per G Tube route Daily (before breakfast). DOS) 45 Tab 2    gabapentin (NEURONTIN) 300 mg capsule 1 Cap by Per G Tube route three (3) times daily. 90 Cap 3    amino acids-protein hydrolys (PROSOURCE NO CARB) 15-60 gram-kcal/30 mL lipk Take 30 mL by mouth daily.  melatonin 3 mg tablet 1 Tab by Per G Tube route nightly as needed. (Patient taking differently: 10 mg by Per G Tube route nightly as needed.) 30 Tab 0    cholecalciferol, vitamin D3, (VITAMIN D3) 2,000 unit tab 1 Tab by PEG Tube route daily.  omeprazole (PRILOSEC) 2.5 mg suDR delayed release suspension 20 mg by Per G Tube route two (2) times a day.  lidocaine HCl (XYLOCAINE) 3 % topical cream Apply  to affected area two (2) times a day. Indications: WITH DESITIN      aspirin 81 mg chewable tablet 81 mg by PEG Tube route daily.  cyanocobalamin (VITAMIN B12) 500 mcg tablet 500 mcg by PEG Tube route daily. Via peg       dronabinol (SYNDROS) 5 mg/mL soln Take  by mouth.  zoledronic acid (RECLAST) 5 mg/100 mL pgbk 5 mg by IntraVENous route once. Once a year      multivitamin (THERAGRAN) liquid 10 mL by PEG Tube route daily.           Allergies   Allergen Reactions    Megestrol Itching    Percocet [Oxycodone-Acetaminophen] Itching and Hives    Percodan [Oxycodone-Aspirin] Rash and Itching    Plavix [Clopidogrel] Rash and Hives       Social History   Substance Use Topics    Smoking status: Current Every Day Smoker     Packs/day: 0.50     Years: 42.00     Types: Cigarettes    Smokeless tobacco: Never Used    Alcohol use 8.4 oz/week     14 Cans of beer, 0 Standard drinks or equivalent per week       Family History   Problem Relation Age of Onset    Heart Disease Mother        Review of Systems  Pertinent positives and negatives as noted otherwise comprehensive review is negative. Examination  Visit Vitals    /60    Pulse 95    Resp 18    SpO2 99%     Pleasant 82-year-old female, cachectic appearing in no acute distress. No icterus. She is alert and oriented. She has no abnormal movements. No signs of incoordination or ataxia. She is maintained in a wheelchair today. Impression/Plan  Oneida Velarde is a 63 y.o. female whose history and physical are consistent with seizure. One seizure activity as recorded by her  in the interim. He says this was several weeks ago. He said that he found her slumped over on the couch. She said that her right hand was tense. He said that he picked her up and called for her and she came out of it within a few minutes. Denies biting her tongue or losing her water. She has been dealing with significant stress regarding right foot fracture and subsequent infection of the hardware. Now dealing with poor wound healing. He endorses that she did miss several evening doses of Keppra. At this juncture we will maintain current dose of Keppra thousand milligrams twice daily. Provided refills. They are to call in the interim if they have any concerns. Otherwise we will follow-up in about 9 months as she has been stable previous to this. Patient and patient's  are agreeable with plan of care and all questions have been addressed. Diagnoses and all orders for this visit:    1. Seizures ([address]    Other orders  -     levETIRAcetam (KEPPRA) 100 mg/ml soln oral solution; 10 mL by Per G Tube route two (2) times a day.     Total time: 25 min   Counseling / coordination time: 19 min   > 50% counseling / coordination?: Yes re: symptoms, management, medications, safety, answering questions, and plan of care.    Signed By: Lj Lambert NP    This note will not be viewable in [address]

## 2018-05-24 NOTE — PROGRESS NOTES
Chief Complaint   Patient presents with    Neurologic Problem     f/u seizures. Patients  states that patient has had one seizure since last visit. Patient placed in room 7. Chart and medications reviewed with patient.

## 2018-05-30 NOTE — PROGRESS NOTES
Patient has graduated from the Complex Case Management  program on 5/30/18. Patient's symptoms are stable at this time. Patient/family has the ability to self-manage. Care management goals have been completed at this time. No further nurse navigator follow up scheduled. Goals Addressed             Most Recent       Post ED     COMPLETED: Supportive resources in place to maintain patient in the community (ie., home health, equipment, DME, refer to, etc.)   On track (5/30/2018)             4/30/18: Patient continues to attend outpatient wound care at [address] [place] [address] [facility]). 5/30/18: Patient continues to attend outpt wound care at [address] [place] [address]. Pt has nurse navigator's contact information for any further questions, concerns, or needs.   Patients upcoming visits:  Future Appointments  Date Time Provider Sade Barajas   7/18/2018 10:00 AM [facility] SCHED   7/31/2018 10:30 AM Zakia Sanchez MD [facility] SCHED   8/15/2018 9:45 AM [facility] NURSE VISIT [facility] SCHED   8/22/2018 11:00 AM Jenel Mohs, MD [address]

## 2018-06-12 NOTE — TELEPHONE ENCOUNTER
[facility]  reports the last [facility] date for Morph[facility] as 2018. There appears to be no inconsistencies in regards to the prescribing of this medication.      Last Visit: 2018 with MD Jake Grier    Next Appointment: 2018 with MD Martinez   Previous Refill Encounters: 2018 per MD Martinez 100 mL    Requested Prescriptions     Pending Prescriptions Disp Refills    morphine 10 mg/5 mL oral solution 100 mL 0     Si.5ml or 5ml by peg tube every 8 hours as needed for pain

## 2018-07-06 PROBLEM — T39.395A GI BLEED DUE TO NSAIDS: Status: ACTIVE | Noted: 2018-01-01

## 2018-07-06 PROBLEM — K92.2 GI BLEED DUE TO NSAIDS: Status: ACTIVE | Noted: 2018-01-01

## 2018-07-12 NOTE — PROGRESS NOTES
Hospital Discharge Follow-Up      Date/Time:  7/12/2018 11:15 AM    Patient was admitted to [facility] on 7/6/2018 and discharged on 7/11/2018 for GI bleed. The physician discharge summary was available at the time of outreach. Nurse Navigator attempted to contact patient post discharge. Left message for the patient to contact the office on the answering machine. Inpatient RRAT score: 21  Was this a readmission? no   Patient stated reason for the readmission: N/A    Disease Specific:   N/A    Home Health orders at discharge: [facility]: [facility]  Date of initial visit: Unknown    Durable Medical Equipment ordered/company: None  Durable Medical Equipment received: N/A    Advance Care Planning:   Does patient have an Advance Directive:  not on file     Medication(s):     New Medications at Discharge: Synthroid and Melatonin  Changed Medications at Discharge: None  Discontinued Medications at Discharge:Asprin and Nebulizer     Current Outpatient Prescriptions   Medication Sig    cyclobenzaprine (FLEXERIL) 5 mg tablet 1 Tab by Per G Tube route three (3) times daily as needed for Muscle Spasm(s).  levETIRAcetam (KEPPRA) 100 mg/ml soln oral solution 10 mL by Per G Tube route two (2) times a day.  albuterol (PROVENTIL VENTOLIN) 2.5 mg /3 mL (0.083 %) nebulizer solution 3 mL by Nebulization route every four (4) hours as needed for Wheezing.  banana flakes trans-galactooligosaccharide (BANATROL) powder Take 1 Packet by mouth.  dronabinol (SYNDROS) 5 mg/mL soln Take  by mouth.  potassium chloride (KAON 10%) 20 mEq/15 mL solution Take 7.5 mL by mouth daily.  levothyroxine (SYNTHROID) 75 mcg tablet 0.5 Tabs by Per G Tube route Daily (before breakfast). (Patient taking differently: 37.5 mcg by Per G Tube route Daily (before breakfast). DOS)    gabapentin (NEURONTIN) 300 mg capsule 1 Cap by Per G Tube route three (3) times daily.     amino acids-protein hydrolys (PROSOURCE NO CARB) 15-60 gram-kcal/30 mL lipk Take 30 mL by mouth daily.  melatonin 3 mg tablet 1 Tab by Per G Tube route nightly as needed. (Patient taking differently: 10 mg by Per G Tube route nightly as needed.)    cholecalciferol, vitamin D3, (VITAMIN D3) 2,000 unit tab 1 Tab by PEG Tube route daily.  omeprazole (PRILOSEC) 2.5 mg suDR delayed release suspension 20 mg by Per G Tube route two (2) times a day.  lidocaine HCl (XYLOCAINE) 3 % topical cream Apply  to affected area two (2) times a day. Indications: WITH DESITIN    cyanocobalamin (VITAMIN B12) 500 mcg tablet 500 mcg by PEG Tube route daily. Via peg     multivitamin (THERAGRAN) liquid 10 mL by PEG Tube route daily. No current facility-administered medications for this visit. There are no discontinued medications. PCP/Specialist follow up: Future Appointments  Date Time Provider Sade Barajas   7/18/2018 11:00 AM [facility] 2 [facility] [place] SCHED   7/19/2018 9:30 AM Dennys Quintana NP [facility] SCHED   8/15/2018 9:45 AM [facility] NURSE VISIT [facility] SCHED   8/22/2018 11:00 AM Randa Neumann MD [facility] SCHED   8/24/2018 10:45 AM Ian Pena MD [address] [facility] Provider Appointment: n/a  [facility]:  n/a       Goals        Post Hospitalization     Attends follow-up appointments as directed. Follow up with PCP, 7/19/2018       Prevent complications post hospitalization.             No admissions within 30 days post discharge, 7/11/2018

## 2018-07-13 NOTE — PROGRESS NOTES
Nurse Navigator second attempt to contact patient post discharge. Left message for the patient to contact the office on the answering machine.  SANDHYA scheduled for 7/19/2018 at 0930 with Nirmala Alfredo NP.

## 2018-07-18 NOTE — TELEPHONE ENCOUNTER
Last Visit: 2018 with MD Flash Ruiz    Next Appointment: 2018 with MD Flash Ruiz   Previous Refill Encounters: 2018 per MD Flash Ruiz #[id number] with 1 refill     Requested Prescriptions     Pending Prescriptions Disp Refills    cyclobenzaprine (FLEXERIL) 5 mg tablet 25 Tab 1     Si Tab by Per G Tube route three (3) times daily as needed for Muscle Spasm(s).

## 2018-07-19 NOTE — PROGRESS NOTES
TRANSITIONS OF CARE FACE-TO-FACE VISIT  INTERNISTS OF [facility]:  7/19/2018, MRN: [id number]  Chief Complaint   Patient presents with    Transitions Of Care     Patient reports feeling alittle better however the legs keep swelling and it is wheeping sometimes. Patient reports when is swells too much. Kannan Carrizales is a 63 y.o. female and presents to clinic after recent hospitalization. Subjective:   [facility]    Followed up by the Nurse Navigator Surinder Nguyen on 7/12/18, reported unable to reach. Summary of hospitalization problems/diagnoses: Admitted on 7/6/18 thru 7/11/18 To [facility] diagnosed with GI bleed secondary to NSAID use. Patient presented to the ED with complaints of dizziness, weakness, and dark stool with suprapubic abdominal pain for a few days. She admitted to taking Motrin and ASA for pain relief, with history of GI bleeds. Hemoglobin was 7.9 checked in ED and with positive guaiac stool. She was started on IV Protonix, EGD via peg tube was performed 7/10/18 found with complete obstruction of upper esophagus, distal esophagus with esophagitis thought to be source of bleed however without active bleed. She is status post percutaneous endoscopic gastrostomy tube due to esophageal stricture and oropharyngeal carcinoma. The peg tube was replaced on 7/10/18 by Dr. Jocy Mandel. One unit of PRBC was transfused during admission. Current Outpatient Prescriptions on File Prior to Visit   Medication Sig Dispense Refill    cyclobenzaprine (FLEXERIL) 5 mg tablet 1 Tab by Per G Tube route three (3) times daily as needed for Muscle Spasm(s). 25 Tab 1    levothyroxine (SYNTHROID) 75 mcg tablet TAKE 1/2 TABLET VIA G-TUBE ONCE DAILY BEFORE BREAKFAST 45 Tab 2    levETIRAcetam (KEPPRA) 100 mg/ml soln oral solution 10 mL by Per G Tube route two (2) times a day.  600 mL 11    albuterol (PROVENTIL VENTOLIN) 2.5 mg /3 mL (0.083 %) nebulizer solution 3 mL by Nebulization route every four (4) hours as needed for Wheezing. 24 Each 0    banana flakes trans-galactooligosaccharide (BANATROL) powder Take 1 Packet by mouth.  dronabinol (SYNDROS) 5 mg/mL soln Take  by mouth.  potassium chloride (KAON 10%) 20 mEq/15 mL solution Take 7.5 mL by mouth daily. 480 mL 1    gabapentin (NEURONTIN) 300 mg capsule 1 Cap by Per G Tube route three (3) times daily. 90 Cap 3    amino acids-protein hydrolys (PROSOURCE NO CARB) 15-60 gram-kcal/30 mL lipk Take 30 mL by mouth daily.  melatonin 3 mg tablet 1 Tab by Per G Tube route nightly as needed. (Patient taking differently: 10 mg by Per G Tube route nightly as needed.) 30 Tab 0    cholecalciferol, vitamin D3, (VITAMIN D3) 2,000 unit tab 1 Tab by PEG Tube route daily.  omeprazole (PRILOSEC) 2.5 mg suDR delayed release suspension 20 mg by Per G Tube route two (2) times a day.  lidocaine HCl (XYLOCAINE) 3 % topical cream Apply  to affected area two (2) times a day. Indications: WITH DESITIN      cyanocobalamin (VITAMIN B12) 500 mcg tablet 500 mcg by PEG Tube route daily. Via peg       multivitamin (THERAGRAN) liquid 10 mL by PEG Tube route daily. No current facility-administered medications on file prior to visit.         Medication changes: no  Medication list updated:  yes  Needs referral or labs:  no  Treatment Barriers: no      Patient Active Problem List    Diagnosis Date Noted    GI bleed due to NSAIDs 07/06/2018    Elevated alkaline phosphatase level 05/14/2018    Bone infection, ankle/foot [facility] 02/15/2018    Foot dislocation, right, sequela 12/12/2017    Controlled substance agreement signed 08/10/2017    Seizures ([address] 05/21/2017    Age-related osteoporosis with current pathological fracture 05/21/2017    Closed avulsion fracture of greater trochanter of femur ([facility] [address] 04/07/2017    Essential hypertension 03/05/2017    Multiple pulmonary nodules 03/05/2017    Current smoker 10/16/2016    History of radiation to head and neck region 07/05/2016    S/P percutaneous endoscopic gastrostomy (PEG) tube placement ([facility] [address] 02/15/2016    Dysphagia, cricopharyngeal 12/29/2015    GI bleed 12/26/2015    Carpal tunnel syndrome, left 12/14/2015    Vitamin D deficiency 11/21/2015    Acquired hypothyroidism 11/21/2015    Hyponatremia 11/21/2015    Esophageal stricture 05/09/2014    Anemia 01/16/2014    Squamous cell carcinoma of oropharynx, with PEG tube and tracheostomy 05/20/2013    Severe protein-calorie malnutrition ([facility] [address] 04/11/2013    Celiac artery stenosis, status post stent 04/10/2013    Carotid artery disease ([facility] [address] bilateral moderate 09/11/2012    COPD (chronic obstructive pulmonary disease) ([facility] [address] 06/01/2012    Melanoma right scapula        Current Outpatient Prescriptions   Medication Sig Dispense Refill    cyclobenzaprine (FLEXERIL) 5 mg tablet 1 Tab by Per G Tube route three (3) times daily as needed for Muscle Spasm(s). 25 Tab 1    levothyroxine (SYNTHROID) 75 mcg tablet TAKE 1/2 TABLET VIA G-TUBE ONCE DAILY BEFORE BREAKFAST 45 Tab 2    levETIRAcetam (KEPPRA) 100 mg/ml soln oral solution 10 mL by Per G Tube route two (2) times a day. 600 mL 11    albuterol (PROVENTIL VENTOLIN) 2.5 mg /3 mL (0.083 %) nebulizer solution 3 mL by Nebulization route every four (4) hours as needed for Wheezing. 24 Each 0    banana flakes trans-galactooligosaccharide (BANATROL) powder Take 1 Packet by mouth.  dronabinol (SYNDROS) 5 mg/mL soln Take  by mouth.  potassium chloride (KAON 10%) 20 mEq/15 mL solution Take 7.5 mL by mouth daily. 480 mL 1    gabapentin (NEURONTIN) 300 mg capsule 1 Cap by Per G Tube route three (3) times daily. 90 Cap 3    amino acids-protein hydrolys (PROSOURCE NO CARB) 15-60 gram-kcal/30 mL lipk Take 30 mL by mouth daily.  melatonin 3 mg tablet 1 Tab by Per G Tube route nightly as needed.  (Patient taking differently: 10 mg by Per G Tube route nightly as needed.) 30 Tab 0    cholecalciferol, vitamin D3, (VITAMIN D3) 2,000 unit tab 1 Tab by PEG Tube route daily.  omeprazole (PRILOSEC) 2.5 mg suDR delayed release suspension 20 mg by Per G Tube route two (2) times a day.  lidocaine HCl (XYLOCAINE) 3 % topical cream Apply  to affected area two (2) times a day. Indications: WITH DESITIN      cyanocobalamin (VITAMIN B12) 500 mcg tablet 500 mcg by PEG Tube route daily. Via peg       multivitamin (THERAGRAN) liquid 10 mL by PEG Tube route daily. Allergies   Allergen Reactions    Megestrol Itching    Percocet [Oxycodone-Acetaminophen] Itching and Hives    Percodan [Oxycodone-Aspirin] Rash and Itching    Plavix [Clopidogrel] Rash and Hives       Past Medical History:   Diagnosis Date    Age-related osteoporosis with current pathological fracture 5/21/2017    Body mass index (BMI) less than 16.5     13.2    Cigarette smoker     Closed avulsion fracture of greater trochanter of femur ([address] 4/7/2017    COPD (chronic obstructive pulmonary disease) ([address]     Esophageal stricture 2014    s/p XRT for oropharyngeal cancer; requiring GJ tube for nutrition.  Foot fracture, right 12/07/2017    Hypertension     Hypothyroidism     Melanoma ([address]     Mesenteric ischemia ([address]     Oropharyngeal cancer ([address] 5/2013    [id number] squamous cell carcinoma of posterion pharynx and tonsil s/p XRT and chemo.    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    s/p stent    Surgical wound infection     right foot    Vitamin D deficiency        Past Surgical History:   Procedure Laterality Date    ABDOMEN SURGERY PROC UNLISTED      \"ulcer\" surgery    HX ENDOSCOPY  5/9/2014    w/ dilation    HX ENDOSCOPY  5/13/2014    w/ dilation    HX ENDOSCOPY  5/21    with Dilatation    HX ENDOSCOPY  6/4/2014    w/ dilation    HX GI      HX ORTHOPAEDIC intramedullary chao fixation of the right tiba    HX OTHER SURGICAL      skin cancer removal from right shoulder; PEG TUBE    HX OTHER SURGICAL Right 02/16/2018    right foot surgery    HX TRACHEOSTOMY         Family History   Problem Relation Age of Onset    Heart Disease Mother        Social History   Substance Use Topics    Smoking status: Current Every Day Smoker     Packs/day: 0.50     Years: 42.00     Types: Cigarettes    Smokeless tobacco: Never Used    Alcohol use 0.0 oz/week     0 Standard drinks or equivalent per week      Comment: occasional       ROS   Review of Systems   Constitutional: Negative. Respiratory: Negative. Cardiovascular: Negative. Gastrointestinal: Negative. Genitourinary: Negative. Neurological: Negative. Objective     Vitals:    07/19/18 0907   BP: 110/70   Pulse: 100   Resp: 14   Temp: 98.6 °F (37 °C)   TempSrc: Oral   SpO2: 98%   Height: 5' 2\" (1.575 m)   PainSc:   1   PainLoc: Leg       Physical Exam   Constitutional: She is oriented to person, place, and time. Very frail   HENT:   Head: Normocephalic and atraumatic. Cardiovascular: Normal rate, regular rhythm, normal heart sounds and intact distal pulses. Pulmonary/Chest: Effort normal and breath sounds normal. No respiratory distress. She has no wheezes. She has no rales. She exhibits no tenderness. Abdominal: Soft. Bowel sounds are normal. She exhibits no distension. There is no tenderness. Neurological: She is alert and oriented to person, place, and time. Skin: Skin is warm and dry. Right foot wound site CDI   Psychiatric: Mood, memory, affect and judgment normal.   Vitals reviewed. LABS   Data Review:   7/11/18 Hemoglobin 7.4    Assessment/Plan:   Lab review: orders written for new lab studies as appropriate; see orders  Referrals: not needed  Complexity: High    Community resources identified for patient: none needed  Durable Medical Equipment: none needed    1.  Hospital discharge follow-up    - HGB & HCT; Future    2. GI bleed due to NSAIDs    - HGB & HCT; Future    3. Iron deficiency anemia, unspecified iron deficiency anemia type    - HGB & HCT; Future      A. Key points we discussed today:  1. Pt instructed to call our office if symptoms worsen or if the pt/caregiver has any questions. 2. Follow up with Dr. Belén Romero, GI, was recommended on discharge for 2-3 week follow up. Orders Placed This Encounter    HGB & HCT     Standing Status:   Future     Standing Expiration Date:   7/20/2019       B. New labs/medications ordered today:   1. A new medication list was given to the patient/family/caregiver. 2. H&H ordered, will review and call with results. The medication list has been updated. A new medication list was given today to the patient. I have discussed the diagnosis with the patient and the intended plan as seen in the above orders. The patient has received an after-visit summary and questions were answered concerning future plans. I have discussed medication side effects and warnings with the patient as well. I have reviewed the plan of care with the patient, accepted their input and they are in agreement with the treatment goals. All questions were answered. The patient understands the plan of care. Handouts provided today with above information. Pt instructed if symptoms worsen to call the office or report to the ED for continued care. Greater than 50% of the visit time was spent in counseling and/or coordination of care.       Follow-up Disposition: Not on File      KAYKAY Bonilla  Internist of [address], [facility].  Phone: [phone]  Fax: [phone]

## 2018-07-19 NOTE — PROGRESS NOTES
Brittney Hill is a 63 y.o. {race/ethnicity:03950} female and presents with    Chief Complaint   Patient presents with    Transitions Of Care     Patient reports feeling alittle better however the legs keep swelling and it is wheeping sometimes. Patient reports when is swells too much. Subjective:  HPI   Wound care provider ordered testing regarding decreased perfusion, US duplex venous and arterial testing-     Additional Concerns: none     ROS   ROS    Allergies   Allergen Reactions    Megestrol Itching    Percocet [Oxycodone-Acetaminophen] Itching and Hives    Percodan [Oxycodone-Aspirin] Rash and Itching    Plavix [Clopidogrel] Rash and Hives       Current Outpatient Prescriptions   Medication Sig Dispense Refill    cyclobenzaprine (FLEXERIL) 5 mg tablet 1 Tab by Per G Tube route three (3) times daily as needed for Muscle Spasm(s). 25 Tab 1    levothyroxine (SYNTHROID) 75 mcg tablet TAKE 1/2 TABLET VIA G-TUBE ONCE DAILY BEFORE BREAKFAST 45 Tab 2    levETIRAcetam (KEPPRA) 100 mg/ml soln oral solution 10 mL by Per G Tube route two (2) times a day. 600 mL 11    albuterol (PROVENTIL VENTOLIN) 2.5 mg /3 mL (0.083 %) nebulizer solution 3 mL by Nebulization route every four (4) hours as needed for Wheezing. 24 Each 0    banana flakes trans-galactooligosaccharide (BANATROL) powder Take 1 Packet by mouth.  dronabinol (SYNDROS) 5 mg/mL soln Take  by mouth.  potassium chloride (KAON 10%) 20 mEq/15 mL solution Take 7.5 mL by mouth daily. 480 mL 1    gabapentin (NEURONTIN) 300 mg capsule 1 Cap by Per G Tube route three (3) times daily. 90 Cap 3    amino acids-protein hydrolys (PROSOURCE NO CARB) 15-60 gram-kcal/30 mL lipk Take 30 mL by mouth daily.  melatonin 3 mg tablet 1 Tab by Per G Tube route nightly as needed. (Patient taking differently: 10 mg by Per G Tube route nightly as needed.) 30 Tab 0    cholecalciferol, vitamin D3, (VITAMIN D3) 2,000 unit tab 1 Tab by PEG Tube route daily.       omeprazole (PRILOSEC) 2.5 mg suDR delayed release suspension 20 mg by Per G Tube route two (2) times a day.  lidocaine HCl (XYLOCAINE) 3 % topical cream Apply  to affected area two (2) times a day. Indications: WITH DESITIN      cyanocobalamin (VITAMIN B12) 500 mcg tablet 500 mcg by PEG Tube route daily. Via peg       multivitamin (THERAGRAN) liquid 10 mL by PEG Tube route daily. Social History     Social History    Marital status:      Spouse name: N/A    Number of children: N/A    Years of education: N/A     Occupational History    Not on file. Social History Main Topics    Smoking status: Current Every Day Smoker     Packs/day: 0.50     Years: 42.00     Types: Cigarettes    Smokeless tobacco: Never Used    Alcohol use 0.0 oz/week     0 Standard drinks or equivalent per week      Comment: occasional    Drug use: No    Sexual activity: Not on file     Other Topics Concern    Not on file     Social History Narrative       Past Medical History:   Diagnosis Date    Age-related osteoporosis with current pathological fracture 5/21/2017    Body mass index (BMI) less than 16.5     13.2    Cigarette smoker     Closed avulsion fracture of greater trochanter of femur ([address] 4/7/2017    COPD (chronic obstructive pulmonary disease) ([address]     Esophageal stricture 2014    s/p XRT for oropharyngeal cancer; requiring GJ tube for nutrition.  Foot fracture, right 12/07/2017    Hypertension     Hypothyroidism     Melanoma ([address]     Mesenteric ischemia ([address]     Oropharyngeal cancer ([address] 5/2013    [id number] squamous cell carcinoma of posterion pharynx and tonsil s/p XRT and chemo.    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    s/p stent    Surgical wound infection     right foot    Vitamin D deficiency        Past Surgical History:   Procedure Laterality Date    ABDOMEN SURGERY PROC UNLISTED \"ulcer\" surgery    HX ENDOSCOPY  5/9/2014    w/ dilation    HX ENDOSCOPY  5/13/2014    w/ dilation    HX ENDOSCOPY  5/21    with Dilatation    HX ENDOSCOPY  6/4/2014    w/ dilation    HX GI      HX ORTHOPAEDIC      intramedullary chao fixation of the right tiba    HX OTHER SURGICAL      skin cancer removal from right shoulder; PEG TUBE    HX OTHER SURGICAL Right 02/16/2018    right foot surgery    HX TRACHEOSTOMY         Family History   Problem Relation Age of Onset    Heart Disease Mother        Objective:  Vitals:    07/19/18 0907   BP: 110/70   Pulse: 100   Resp: 14   Temp: 98.6 °F (37 °C)   TempSrc: Oral   SpO2: 98%   Height: 5' 2\" (1.575 m)   PainSc:   1   PainLoc: Leg       LABS   Results for orders placed or performed in visit on 07/18/18   DUPLEX CAROTID BILATERAL   Result Value Ref Range    Right cca dist sys 85.28 cm/s    Right CCA dist jha 16.69 cm/s    Right CCA prox sys 96.93 cm/s    Right CCA prox jha 20.58 cm/s    Right eca sys 130.23 cm/s    RIGHT EXTERNAL CAROTID ARTERY D 16.55 cm/s    Right ICA dist sys 97.05 cm/s    Right ICA dist jha 24.94 cm/s    Right ICA mid sys 116.43 cm/s    Right ICA mid jha 16.00 cm/s    Right ICA prox sys 131.49 cm/s    Right ICA prox jha 23.97 cm/s    Right Bulb .26 cm/s    Right Bulb EDV 18.92 cm/s    Right ICA/CCA sys 1.56     Left vertebral sys 71.61 cm/s    LEFT VERTEBRAL ARTERY D 22.05 cm/s    Left CCA dist sys 61.18 cm/s    Left CCA dist jha 15.52 cm/s    Left CCA prox sys 75.53 cm/s    Left CCA prox jha 18.13 cm/s    Left ECA sys 76.83 cm/s    LEFT EXTERNAL CAROTID ARTERY D 12.91 cm/s    Left ICA dist sys 120.05 cm/s    Left ICA dist jha 21.49 cm/s    Left ICA mid sys 114.88 cm/s    Left ICA mid jha 33.76 cm/s    Left ICA prox sys 100.34 cm/s    Left ICA prox jha 31.34 cm/s    Left Bulb PSV 62.53 cm/s    Left Bulb EDV 17.47 cm/s    Left ICA/CCA sys 1.96     Left arm  mmHg    Right arm  mmHg       TESTS  ***    PE  Physical Exam    {Exam, Complete:45724}  {cvs disease exams:936285}    Assessment/Plan:    {control:91412}    Lab review: {lab reviewed:166835}    Today's Visit:   {There are no diagnoses linked to this encounter. (Refresh or delete this SmartLink)}    ***    Health Maintenance: ***    I have discussed the diagnosis with the patient and the intended plan as seen in the above orders. The patient has received an after-visit summary and questions were answered concerning future plans. I have discussed medication side effects and warnings with the patient as well. I have reviewed the plan of care with the patient, accepted their input and they are in agreement with the treatment goals. Follow-up Disposition: Not on File  *** More than 1/2 of this *** minute visit was spent in counseling and coordination of care, as described above.     KAYKAY Burroughs  Internist of [address], [address] [place] [address].  Phone: [phone]  Fax: [phone]

## 2018-07-19 NOTE — PROGRESS NOTES
1. Have you been to the ER, urgent care clinic or hospitalized since your last visit? YES.     2. Have you seen or consulted any other health care providers outside of the [address] [place] [address] since your last visit (Include any pap smears or colon screening)? NO      Do you have an Advanced Directive? NO    Would you like information on Advanced Directives?  NO

## 2018-07-19 NOTE — PROGRESS NOTES
Goals Addressed             Most Recent     Attends follow-up appointments as directed. On track (7/19/2018)             Follow up with PCP, 7/19/2018 7/19/18: Attended appt with NP as scheduled.

## 2018-08-02 NOTE — TELEPHONE ENCOUNTER
[facility] reports the last fill date for Morphine as 2018. There appears to be no inconsistencies in regards to the prescribing of this medication.      Last Visit: 2018 with LILIANA Stephen    Next Appointment: 2018 with MD Martinez   Previous Refill Encounters: 2018 per MD Martinez 100 mL    Requested Prescriptions     Pending Prescriptions Disp Refills    morphine 10 mg/5 mL oral solution 100 mL 0     Si.5 or 5 mL by peg tube every 8 hours as needed for pain

## 2018-08-09 NOTE — TELEPHONE ENCOUNTER
Last Visit: 2018 with LILIANA Stephen    Next Appointment: 2018 with MD Bosie Aase   Previous Refill Encounters: 2017 per MD Bosie Aase #[id number] with 3 refills     Requested Prescriptions     Pending Prescriptions Disp Refills    gabapentin (NEURONTIN) 300 mg capsule 90 Cap 3     Si Cap by Per G Tube route three (3) times daily.

## 2018-08-14 NOTE — PROGRESS NOTES
Patient has graduated from the Transitions of Care Coordination  program on 8/14/2018. Patient's symptoms are stable at this time. Patient/family has the ability to self-manage. Care management goals have been completed at this time. No further nurse navigator follow up scheduled. Goals Addressed             Most Recent       Post Hospitalization     COMPLETED: Attends follow-up appointments as directed. On track (7/19/2018)             Follow up with PCP, 7/19/2018 7/19/18: Attended appt with NP as scheduled.  COMPLETED: Prevent complications post hospitalization. On track (8/14/2018)             No admissions within 30 days post discharge, 7/11/2018 8/14/2018: Episode closed: no hospitalization or ED admission post 30 days from discharge. Pt has nurse navigator's contact information for any further questions, concerns, or needs.   Patients upcoming visits:  Future Appointments  Date Time Provider Sade Barajas   8/15/2018 9:45 AM [facility] NURSE VISIT [facility] ZE ARSHAD   8/22/2018 11:00 AM Annalise Lujan MD [facility]   8/24/2018 10:45 AM Bandar Santamaria MD [place] [id number]

## 2018-08-15 NOTE — TELEPHONE ENCOUNTER
Last Visit: 2018 with LILIANA Jonas    Next Appointment: 2018 with MD Prasanna Houston   Previous Refill Encounters: 2018 per MD Prasanna Houston #[id number] with 1 refill     Requested Prescriptions     Pending Prescriptions Disp Refills    cyclobenzaprine (FLEXERIL) 5 mg tablet 25 Tab 1     Si Tab by Per G Tube route three (3) times daily as needed for Muscle Spasm(s).

## 2018-08-22 NOTE — MR AVS SNAPSHOT
[address] [facility] 
 
 
 [address] [facility], [address] [facility] [address] [facility] [address] 
[phone] Patient: Margot Amos MRN: [id number] CUV:[id number] Visit Information Date & Time Provider Department Dept. Phone Encounter #  
 8/22/2018 11:00 AM Esther Edmonds MD [facility] [address] [place] [address] [phone] [id number] Follow-up Instructions Return in about 3 weeks (around 9/12/2018), or if symptoms worsen or fail to improve. Your Appointments 8/24/2018 10:45 AM  
Follow Up with Dulce Kent MD  
[address] [place] [address] and [facility]) Appt Note: 6 MONTH FOLLOW UP AFTER CAROTID; Patient wants a Friday appt will call if there will cancellation; patient rescheduled appt [address] [facility] [place] [address] [address]  
[phone] [address] [facility] [address] [facility] [address] [facility] [address] Upcoming Health Maintenance Date Due Influenza Age 5 to Adult 8/1/2018 PAP AKA CERVICAL CYTOLOGY 11/17/2018 ZOSTER VACCINE AGE 60> 2/28/2019* Pneumococcal 19-64 Highest Risk (3 of 3 - PCV13) 2/28/2023* MEDICARE YEARLY EXAM 2/15/2019 BREAST CANCER SCRN MAMMOGRAM 10/18/2019 COLONOSCOPY 1/10/2023 DTaP/Tdap/Td series (2 - Td) 3/6/2027 *Topic was postponed. The date shown is not the original due date. Allergies as of 8/22/2018  Review Complete On: 8/22/2018 By: Kenzie Angela Severity Noted Reaction Type Reactions Megestrol Medium 02/14/2018    Itching Percocet [Oxycodone-acetaminophen]  12/29/2013    Itching, Hives Percodan [Oxycodone-aspirin]  05/21/2014    Rash, Itching Plavix [Clopidogrel]  04/10/2013    Rash, Hives Current Immunizations  Reviewed on 10/13/2016 Name Date  Influenza Vaccine (Quad) PF 9/5/2017 10:30 AM, 10/13/2016  3:26 PM, 11/17/2015  3:55 PM  
 Influenza Vaccine PF 11/4/2014, 10/21/2013  1:03 PM  
 Pneumococcal Polysaccharide (PPSV-23) 10/13/2016  3:52 PM  
 Tdap 3/6/2017 Not reviewed this visit You Were Diagnosed With   
  
 Codes Comments Abnormal glucose    -  Primary ICD-10-CM: R73.09 
ICD-9-CM: 790.29 Leukocytosis, unspecified type     ICD-10-CM: D72.829 ICD-9-CM: 288.60 Vitals BP Pulse Temp Resp Height(growth percentile) OB Status 116/62 (BP 1 Location: Left arm, BP Patient Position: Sitting) (!) 104 98.1 °F (36.7 °C) (Oral) 14 5' 2\" (1.575 m) Postmenopausal  
 Smoking Status Current Every Day Smoker Vitals History Preferred Pharmacy Pharmacy Name Phone [address], [address] [place] [address] [phone] Your Updated Medication List  
  
   
This list is accurate as of 8/22/18 11:53 AM.  Always use your most recent med list.  
  
  
  
  
 albuterol 2.5 mg /3 mL (0.083 %) nebulizer solution Commonly known as:  PROVENTIL VENTOLIN  
3 mL by Nebulization route every four (4) hours as needed for Wheezing. amoxicillin-clavulanate 500-125 mg per tablet Commonly known as:  AUGMENTIN Take 1 Tab by mouth every twelve (12) hours for 10 days. cyanocobalamin 500 mcg tablet Commonly known as:  VITAMIN B12  
500 mcg by PEG Tube route daily. Via peg  
  
 cyclobenzaprine 5 mg tablet Commonly known as:  FLEXERIL  
1 Tab by Per G Tube route three (3) times daily as needed for Muscle Spasm(s). gabapentin 300 mg capsule Commonly known as:  NEURONTIN  
1 Cap by Per G Tube route three (3) times daily. levETIRAcetam 100 mg/ml Soln oral solution Commonly known as:  KEPPRA 10 mL by Per G Tube route two (2) times a day. levothyroxine 75 mcg tablet Commonly known as:  SYNTHROID  
TAKE 1/2 TABLET VIA G-TUBE ONCE DAILY BEFORE BREAKFAST  
  
 lidocaine HCl 3 % topical cream  
Commonly known as:  XYLOCAINE Apply  to affected area two (2) times a day. Indications: WITH DESITIN  
  
 melatonin 3 mg tablet 1 Tab by Per G Tube route nightly as needed. morphine 10 mg/5 mL oral solution 2.5 or 5 mL by peg tube every 8 hours as needed for pain  
  
 multivitamin liquid Commonly known as:  THERAGRAN  
10 mL by PEG Tube route daily. omeprazole 2.5 mg Sudr delayed release suspension Commonly known as:  PRILOSEC  
20 mg by Per G Tube route two (2) times a day. potassium chloride 20 mEq/15 mL solution Commonly known as:  KAON 10% Take 7.5 mL by mouth daily. PROSOURCE NO CARB 15-60 gram-kcal/30 mL Lipk Generic drug:  amino acids-protein hydrolys Take 30 mL by mouth daily. VITAMIN D3 2,000 unit Tab Generic drug:  cholecalciferol (vitamin D3) 1 Tab by PEG Tube route daily. Prescriptions Sent to Pharmacy Refills  
 amoxicillin-clavulanate (AUGMENTIN) 500-125 mg per tablet 0 Sig: Take 1 Tab by mouth every twelve (12) hours for 10 days. Class: Normal  
 Pharmacy: [id number] [place] [address], [address] [place] [address] #: [phone] Route: Oral  
  
Follow-up Instructions Return in about 3 weeks (around 9/12/2018), or if symptoms worsen or fail to improve. Patient Instructions Preventing Falls: Care Instructions Your Care Instructions Getting around your home safely can be a challenge if you have injuries or health problems that make it easy for you to fall. Loose rugs and furniture in walkways are among the dangers for many older people who have problems walking or who have poor eyesight. People who have conditions such as arthritis, osteoporosis, or dementia also have to be careful not to fall. You can make your home safer with a few simple measures. Follow-up care is a key part of your treatment and safety. Be sure to make and go to all appointments, and call your doctor if you are having problems. It's also a good idea to know your test results and keep a list of the medicines you take. How can you care for yourself at home? Taking care of yourself · You may get dizzy if you do not drink enough water. To prevent dehydration, drink plenty of fluids, enough so that your urine is light yellow or clear like water. Choose water and other caffeine-free clear liquids. If you have kidney, heart, or liver disease and have to limit fluids, talk with your doctor before you increase the amount of fluids you drink. · Exercise regularly to improve your strength, muscle tone, and balance. Walk if you can. Swimming may be a good choice if you cannot walk easily. · Have your vision and hearing checked each year or any time you notice a change. If you have trouble seeing and hearing, you might not be able to avoid objects and could lose your balance. · Know the side effects of the medicines you take. Ask your doctor or pharmacist whether the medicines you take can affect your balance. Sleeping pills or sedatives can affect your balance. · Limit the amount of alcohol you drink. Alcohol can impair your balance and other senses. · Ask your doctor whether calluses or corns on your feet need to be removed. If you wear loose-fitting shoes because of calluses or corns, you can lose your balance and fall. · Talk to your doctor if you have numbness in your feet. Preventing falls at home · Remove raised doorway thresholds, throw rugs, and clutter. Repair loose carpet or raised areas in the floor. · Move furniture and electrical cords to keep them out of walking paths. · Use nonskid floor wax, and wipe up spills right away, especially on ceramic tile floors. · If you use a walker or cane, put rubber tips on it. If you use crutches, clean the bottoms of them regularly with an abrasive pad, such as steel wool. · Keep your house well lit, especially [facility], and outside walkways. Use night-lights in areas such as hallways and bathrooms.  Add extra light switches or use remote switches (such as switches that go on or off when you clap your hands) to make it easier to turn lights on if you have to get up during the night. · Install sturdy handrails on stairways. · Move items in your cabinets so that the things you use a lot are on the lower shelves (about waist level). · Keep a cordless phone and a flashlight with new batteries by your bed. If possible, put a phone in each of the main rooms of your house, or carry a cell phone in case you fall and cannot reach a phone. Or, you can wear a device around your neck or wrist. You push a button that sends a signal for help. · Wear low-heeled shoes that fit well and give your feet good support. Use footwear with nonskid soles. Check the heels and soles of your shoes for wear. Repair or replace worn heels or soles. · Do not wear socks without shoes on wood floors. · Walk on the grass when the sidewalks are slippery. If you live in an area that gets snow and ice in the winter, sprinkle salt on slippery steps and sidewalks. Preventing falls in the bath · Install grab bars and nonskid mats inside and outside your shower or tub and near the toilet and sinks. · Use shower chairs and bath benches. · Use a hand-held shower head that will allow you to sit while showering. · Get into a tub or shower by putting the weaker leg in first. Get out of a tub or shower with your strong side first. 
· Repair loose toilet seats and consider installing a raised toilet seat to make getting on and off the toilet easier. · Keep your bathroom door unlocked while you are in the shower. Where can you learn more? Go to http://dayron-raj.info/. Enter [phone] in the search box to learn more about \"Preventing Falls: Care Instructions. \" Current as of: May 12, 2017 Content Version: 11.7 © 1091-9932 [employer], [employer].  Care instructions adapted under license by [address] (which disclaims liability or warranty for this information). If you have questions about a medical condition or this instruction, always ask your healthcare professional. [address] any warranty or liability for your use of this information. Introducing \[facility]\"" & HEALTH SERVICES! [facility] introduces [employer] patient portal. Now you can access parts of your medical record, email your doctor's office, and request medication refills online. 1. In your internet browser, go to [email or web address] 2. Click on the First Time User? Click Here link in the Sign In box. You will see the New Member Sign Up page. 3. Enter your [employer] Access Code exactly as it appears below. You will not need to use this code after youve completed the sign-up process. If you do not sign up before the expiration date, you must request a new code. · [employer] Access Code: [email or web address] Expires: 9/3/2018  9:28 AM 
 
4. Enter the last four digits of your Social Security Number (xxxx) and Date of Birth (mm/dd/yyyy) as indicated and click Submit. You will be taken to the next sign-up page. 5. Create a [employer] ID. This will be your [employer] login ID and cannot be changed, so think of one that is secure and easy to remember. 6. Create a [employer] password. You can change your password at any time. 7. Enter your Password Reset Question and Answer. This can be used at a later time if you forget your password. 8. Enter your e-mail address. You will receive e-mail notification when new information is available in [address] 9. Click Sign Up. You can now view and download portions of your medical record. 10. Click the Download Summary menu link to download a portable copy of your medical information. If you have questions, please visit the Frequently Asked Questions section of the [employer] website.  Remember, [employer] is NOT to be used for urgent needs. For medical emergencies, dial 911. Now available from your iPhone and Android! Please provide this summary of care documentation to your next provider. Your primary care clinician is listed as Pam Stokes. If you have any questions after today's visit, please call [phone].

## 2018-08-22 NOTE — PATIENT INSTRUCTIONS

## 2018-08-22 NOTE — PROGRESS NOTES
Chief Complaint   Patient presents with    Foot Injury     3 month follow up     Health Maintenance Due   Topic Date Due    Influenza Age 5 to Adult  08/01/2018    PAP AKA CERVICAL CYTOLOGY  11/17/2018     1. Have you been to the ER, urgent care clinic or hospitalized since your last visit? NO    2. Have you seen or consulted any other health care providers outside of the [address] [place] [address] since your last visit (Include any pap smears or colon screening)? NO      Do you have an Advanced Directive? NO    Would you like information on Advanced Directives?  NO

## 2018-08-24 NOTE — TELEPHONE ENCOUNTER
Reviewed blood work from visit. Please let the patient know the following:    WBC count improved from elevated value on pre-visit labs. However with persistent elevated neutrophils, which is a sign of infection, so would continue treatment with Augmentin as prescribed for sinus infection.      Kidney function and electrolytes are normal.

## 2018-08-24 NOTE — TELEPHONE ENCOUNTER
Called to speak with patient and she was unavailable.  answered and message below was given. He verbalized understanding with no additional questions or concerns.

## 2018-08-27 PROBLEM — L97.514 NON-PRESSURE CHRONIC ULCER OF OTHER PART OF RIGHT FOOT WITH NECROSIS OF BONE (HCC): Status: ACTIVE | Noted: 2018-01-01

## 2018-08-28 NOTE — PROGRESS NOTES
HPI:   Nory Acosta is a 63y.o. year old female who presents today for post hospitalization follow-up. She is accompanied by her . She has a history of squamous cell carcinoma of the oropharynx, COPD, hypertension, PAD, carotid artery disease, mesenteric ischemia, pulmonary nodules, osteoporosis, seizure disorder, and carpal tunnel syndrome. She is accompanied by her . She is continuing to smoke approximately one pack per day, and her  is smoking as well. She reports that for the last 2-3 weeks, she has had continued difficulty with nasal congestion, headache, facial pain, post nasal drainage, and cough productive of green sputum. She denies any fever or chills. She is otherwise without complaints. In 7/2018, she presented to [facility] with complaints of weakness, dark stool and suprapubic abdominal pain. She admitted taking ibuprofen and aspirin for pain. She was found to have guaiac + stool, and initial Hb 7.9 (baseline Hb 10-11). Endoscopy through her PEG was performed showing complete obstruction of the upper esophagus, and esophagitis in the distal esophagus. She was treated with IV Protonix, 1 U PRBCs, and an infusion of iron sucrose x 1 dose. She also underwent replacement of her PEG tube by Dr. Joan Lopez. She continues to take omeprazole, and has had no further difficulty. She denies any abdominal pain, nausea, vomiting, melena, hematochezia, or change in bowel movements. In 12/2017, she sustained a closed dislocation of the right great toe at the metatarsocuneiform joint with a comminuted intraarticular fracture of the metatarsal. On 12/12/2017, she underwent open reduction and internal fixation surgery of her right great toe by Dr. Paris Pérez at [facility]. She did well postoperatively, although experienced transient episodes of hypotension although asymptomatic. She was discharged on 12/14/2017.  She states that she had a cast in place until 2/5/2018, and upon removal, it was noted that she had an open wound on the dorsum of her foot 3.5 cm x 1 cm and 1.4 cm deep secondary to dehiscence of the operative site. She received wound care at [facility], and it was discovered that the ulcer extended down to the metal hardware that was in place. Wound cultures were positive for rare coagulase negative Staph treated with Keflex. She was hospitalized from 2/15-2/22/2018 and underwent hardware removal by Dr. Tasneem Mortensen. She was found to have underlying osteomyelitis and bone necrosis and debridement was performed. Hospital course was complicated by C.diff colitis with sepsis requiring pressor support. She was treated with po vancomycin with good response. Total treatment recommended for 6 weeks. She also had severe electrolyte disturbances, thought to be secondary to severe diarrhea and poor nutritional status, specifically K, Mg, and phosphorus, requiring supplementation. A PICC line was placed for 6 weeks of IV Levaquin and vancomycin (completed Levaquin 3/29/18 and vancomycin 4/1/2018). A wound vac was also placed to help with healing. Wound culture (4/17/2018) showed two morphotypes of coag. negative Staph and few Candida albicans, and she was treated with Bactrim and fluconazole by LILIANA Feliciano F[facility]). She continues to have difficulty with the persistent chronic ulcer and underlying bone necrosis, although she is no longer required to use the wound vac. She is now having sharp debridement weekly in the wound clinic, with bandaging with Lambs wool. She is utilizing a wheelchair for mobility, and remains unable to fully bear weight on that foot. She continues to be followed by Dr. Tasneem Mortensen and Dr. Finn Grier of plastic surgery. She was admitted to [facility] from 4/7-4/9/2017 after tripping at home and developing right hip pain. She was found to have a non-displaced comminuted fracture of the greater trochanter of the right femur.  Orthopedics was consulted and non-surgical intervention and patient was subsequently discharged with plans for home physical therapy. However, several hours after getting home, patient developed two seizure like episodes manifesting as a chewing motion with upper arm shaking. After the second episode, she became unresponsive, EMS was called and she was transported back to [facility]. While in the ED, she had multiple episodes of staring with right facial twitching, and while being evaluated by Dr. Patti Park of neurology, she developed a generalized tonic-clonic seizure with deviation of her eyes and head/neck to the right. She was loaded with Keppra, and the seizure was terminated. She did remain post-ictal for some time after episode. Evaluation in the ED included a stat EEG which was obtained after the seizure, and it showed interictal epileptiform discharges. Head CT scan showed atrophy and periventricular microvascular ischemia without acute changes. Chest x-ray was negative. Urine culture was positive for Klebsiella pneumoniae and she was treated with IV ceftriaxone and transitioned to po Keflex. Repeat EEG (4/10/2017) was normal without focal slowing or epileptiform activity. She remained seizure free on Keppra, and was discharged on 4/13/2017 to [facility]. She returned home on 4/25/2017. She was evaluated by Dr. Godfrey Coon regarding her recent generalized seizure-like episodes, and he recommended obtaining a repeat EEG and brain MRI. She had a sleep deprived EEG on 7/6/2017 which was a normal awake recording with prominent beta activity in the anterior central region suggestive of drug effect. She did not have the MRI performed due to anxiety despite premedication with valium. She reports that she is continuing to take [address] and has not had any recurrent seizures. 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 She underwent panendoscopy with biopsy, PEG tube placement and tracheostomy on 5/13/2013.  She was subsequently treated with radiation and chemotherapy (Taxol and Cisplatin), completed 8/6/2013. She has had no clinical evidence of recurrent disease, with negative serial PET scans/ CT scans of the chest and neck. A follow-up PET scan was obtained in 2/4/2016 which revealed no tumor activity in the head or neck, but multifocal patchy nodular activity in the lungs (right > left), could be inflammatory although could also be consistent with metastases. A chest CT scan was obtained 2/26/16 revealing new bilateral pulmonary nodules, also consistent with inflammation vs. metastases. She had a repeat chest CT scan on 6/21/2016, which showed that all of the lung nodular densities had improved in appearance and decreased in size, with no new or enlarging pulmonary nodules or enlarged lymph nodes demonstrated. She also had a neck CT scan (6/21/2016) which was negative for enlarged cervical lymph nodes, but there was an amorphous soft tissue density diffusely within the parapharyngeal fat, which was most likely post-therapy change rather than neoplastic recurrence since there was no discrete masslike focal accumulation of tissue. She had a repeat PET scan (1/21/2017) which showed marked interval improvement of multifocal hypermetabolic lesions since 4/5899, with only a mild hypermetabolic focus in the right pretracheal mediastinum, probably correlating to a tiny lymph node, clinically reactive. She had a repeat CT scan of the neck (7/11/2017) showing no residual or recurrent mass, and CT scan of the chest (7/11/2017) showing interval development of minimal hazy interstitial infiltrate, posterior subsegment left upper lobe since prior CT in 1/2017, but no definitive evidence of metastatic disease. She is being followed by Dr. Grant Lan [facility] ENT) and Dr. Shawna King (oncology). Following her XRT and chemotherapy treatment, the tracheostomy was successfully removed.  Her course has been complicated by esophageal stricture and aspiration, requiring G tube placement. She underwent multiple endoscopic dilatations under fluoroscopy using rendezvous techniques (10/2014) with reestablishment of continuity with her upper and lower esophagus. However, it was noted that she was having significant aspiration with both solids and liquids, due to poor laryngeal mobility from epiglottic and laryngeal scarring s/p XRT. In 11/29/2015, she was found to have a posterior left upper lobe cavitary mass on chest CT scan. She was admitted to [facility] and underwent bronchoscopy and bronchoalveolar lavage, which revealed no endobronchial obstruction or nodules and pathology was negative for malignant cells. Her QuantiFERON Gold was negative for TB. Cultures were positive for MSSA and pseudomonas and she was diagnosed with a left upper lobe abscess, thought to be secondary to aspiration. She was discharged home on 12/9/2015 with a PICC line and was treated with nafcillin and meropenem. She was again hospitalized at [facility] on 12/26/2015 when she presented with melena and leakage from PEG site and was found to have a Hb of 6.1. She was transfused and due to persistent leakage, had her G tube converted to a G-J tube by interventional radiology. She had severe hypokalemia, which required multiple IV/ per tube dosing. She underwent endoscopy by Dr. Shelia Chou, but he was only able to pass the scope into the pharynx since the upper esophageal sphincter was completely fused shut. ENT was consulted about possibly attempting to open the proximal esophagus, and it was felt that the risk of perforation and mediastinal infection was too high, especially since she had adequete enteral access for nutrition. She was admitted to [facility] from 1/8/2016 to 1/22/2016 for a recurrent upper G-I bleed (Hb 6.8 requiring 4 U of PRBCs), acute hypoxic respiratory failure with interstitial pulmonary edema and bilateral pleural effusions, and candidemia (most likely from PICC line).  She had an echocardiogram (1/15/2016) which showed normal LV size and function (EF 65%). Thoracentesis revealed effusions were transudative. She was treated with fluconazole, and had her G-J tube converted back to a G-Tube. She had been having difficulty with her G-tube cracking or becoming loose and falling out, but this resolved after she had the tube replaced with a larger tube in 2/2017 by TALHA Simon. She also has a history of peripheral vascular disease and presented in 2/2013 with weight loss and post-prandial pain. 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 Connie Alfonso. She again represented with abdominal complaints in 4/2013 and was thought to have stent restenosis since she was not taking clopidogrel due to itching. In 5/2013, she underwent a celiac arteriogram which showed that the stent was patent. In 6/2015 and 6/2016, an abdominal duplex scan showed >70% stenosis of the celiac artery, but she remains asymptomatic. In 1/2018, celiac stent was found to be patent on ultrasound. In 6/21/2016, surveillance neck CT scan showed high grade bilateral internal carotid arterial stenoses. A carotid duplex scan (6/24/2016) confirmed severe (>70%) right internal carotid artery stenosis and moderate (50-69%) left internal carotid artery stenosis. On 7/20/2016, she underwent a subclavian, cerebral, vertebral, and carotid arteriogram by Dr. Connie Alfonso, which revealed multiple areas of atherosclerosis, but no significant stenoses; the right internal carotid artery had a 50% narrowing noted while all other vessels were patent. She had a repeat carotid duplex (1/2018) which showed moderate (50-69%) stenosis bilaterally. She continues to be maintained on aspirin. She has a history of hypertension that was treated in the past with lisinopril. She no longer requires medication. She also has a history of hypothyroidism and is on Synthroid. Denies any cold intolerance, hair or skin changes. She has a history of osteopenia diagnosed in 2010, with bone density scan (3/2017) showing progression to osteoporosis with T-scores: femoral neck  left -3.0 / right -2.4, and lumbar -0.9. She was treated with alendronate in 2010 for one year, but she discontinued it due to throat irritation. She continues to take calcium and Vitamin D. She was referred to see Dr. Germán Harrell in 4/2017 for recommendations regarding treatment, given her prior head and neck irradiation and concern for possible increased risk for osteonecrosis with biphosphonate treatment. In 11/2017, she received an IV infusion of zolendronic acid with plans for repeat yearly. Even though it was recognized that she was at increased risk of osteonecrosis of the jaw given her prior head/neck irradiation, it was felt that her risk of fracture was greater. She has a history of suspected COPD, with a long history of smoking 1-2 ppd. She was being treated with Flovent and albuterol-ipratropium (Duo-neb) nebulizers; however, she states that she has not needed to use these recently. She denies any shortness of breath currently, but continues to smoke at least 1 ppd. According to the chart, she has a history of hepatitis C and cirrhosis. However, review of records show negative hepatitis B and C panels in 3/2014 at [facility], and CT scans of abdomen show a normal liver in 5/9/2015 and 11/9/2015. Chest CT scan (7/2017 showed mild diffuse decreased density liver, and mildly dilated 8 mm central biliary tree (but incompletely visualized). She had a screening colonoscopy in 1/2013 by Dr. Felicia Davis which was normal.     She was being followed by Dr. Neha Knight for complaints of daily headaches and numbness and tingling in her left hand, attributed to probable migraines. She is being treated with gabapentin and was instructed to use a wrist splint for probable carpal tunnel syndrome.        Past Medical History:   Diagnosis Date    Age-related osteoporosis with current pathological fracture 5/21/2017    Body mass index (BMI) less than 16.5     13.2    Cigarette smoker     Closed avulsion fracture of greater trochanter of femur ([address] 4/7/2017    COPD (chronic obstructive pulmonary disease) ([address]     Esophageal stricture 2014    s/p XRT for oropharyngeal cancer; requiring GJ tube for nutrition.  Foot fracture, right 12/07/2017    Hypertension     Hypothyroidism     Melanoma ([address]     Mesenteric ischemia ([address]     Oropharyngeal cancer ([facility] 5/2013    [id number] squamous cell carcinoma of posterion pharynx and tonsil s/p XRT and chemo.  Osteopenia     Panic disorder     Peptic ulcer disease     Seizures ([address] 5/21/2017    Stenosis of celiac artery ([facility] 2/2013    s/p stent    Surgical wound infection     right foot    Vitamin D deficiency      Past Surgical History:   Procedure Laterality Date    ABDOMEN SURGERY PROC UNLISTED      \"ulcer\" surgery    HX ENDOSCOPY  5/9/2014    w/ dilation    HX ENDOSCOPY  5/13/2014    w/ dilation    HX ENDOSCOPY  5/21    with Dilatation    HX ENDOSCOPY  6/4/2014    w/ dilation    HX GI      HX ORTHOPAEDIC      intramedullary chao fixation of the right tiba    HX OTHER SURGICAL      skin cancer removal from right shoulder; PEG TUBE    HX OTHER SURGICAL Right 02/16/2018    right foot surgery    HX TRACHEOSTOMY       Current Outpatient Prescriptions   Medication Sig    amoxicillin-clavulanate (AUGMENTIN) 500-125 mg per tablet Take 1 Tab by mouth every twelve (12) hours for 10 days.  cyclobenzaprine (FLEXERIL) 5 mg tablet 1 Tab by Per G Tube route three (3) times daily as needed for Muscle Spasm(s).  gabapentin (NEURONTIN) 300 mg capsule 1 Cap by Per G Tube route three (3) times daily.     morphine 10 mg/5 mL oral solution 2.5 or 5 mL by peg tube every 8 hours as needed for pain    levothyroxine (SYNTHROID) 75 mcg tablet TAKE 1/2 TABLET VIA G-TUBE ONCE DAILY BEFORE BREAKFAST    levETIRAcetam (KEPPRA) 100 mg/ml soln oral solution 10 mL by Per G Tube route two (2) times a day.  potassium chloride (KAON 10%) 20 mEq/15 mL solution Take 7.5 mL by mouth daily.  amino acids-protein hydrolys (PROSOURCE NO CARB) 15-60 gram-kcal/30 mL lipk Take 30 mL by mouth daily.  melatonin 3 mg tablet 1 Tab by Per G Tube route nightly as needed. (Patient taking differently: 5 mg by Per G Tube route nightly as needed.)    cholecalciferol, vitamin D3, (VITAMIN D3) 2,000 unit tab 1 Tab by PEG Tube route daily.  omeprazole (PRILOSEC) 2.5 mg suDR delayed release suspension 20 mg by Per G Tube route two (2) times a day.  lidocaine HCl (XYLOCAINE) 3 % topical cream Apply  to affected area two (2) times a day. Indications: WITH DESITIN    cyanocobalamin (VITAMIN B12) 500 mcg tablet 500 mcg by PEG Tube route daily. Via peg     multivitamin (THERAGRAN) liquid 10 mL by PEG Tube route daily.  albuterol (PROVENTIL VENTOLIN) 2.5 mg /3 mL (0.083 %) nebulizer solution 3 mL by Nebulization route every four (4) hours as needed for Wheezing. No current facility-administered medications for this visit. Allergies and Intolerances: Allergies   Allergen Reactions    Megestrol Itching    Percocet [Oxycodone-Acetaminophen] Itching and Hives    Percodan [Oxycodone-Aspirin] Rash and Itching    Plavix [Clopidogrel] Rash and Hives     Family History: No FH of breast or colon cancer. Sister had uterine cancer. Family History   Problem Relation Age of Onset    Heart Disease Mother      Social History: She is  living with her . She has no children. She retired in 2013 from being the  at the [employer]. She  reports that she has been smoking Cigarettes. She has a 21.00 pack-year smoking history.  She has never used smokeless tobacco.   History   Alcohol Use    0.0 oz/week    0 Standard drinks or equivalent per week     Comment: occasional     Immunization History:   Immunization History   Administered Date(s) Administered    Influenza Vaccine (Quad) PF 11/17/2015, 10/13/2016, 09/05/2017    Influenza Vaccine PF 10/21/2013, 11/04/2014    Pneumococcal Polysaccharide (PPSV-23) 10/13/2016    Tdap 03/06/2017       Review of Systems:   As above included in HPI. Otherwise 11 point review of systems negative including constitutional, skin, HENT, eyes, respiratory, cardiovascular, gastrointestinal, genitourinary, musculoskeletal, endo/heme/aller, neurological.    Physical:   Vitals:   BP: 116/62  HR: (!) 104  WT:  not performed  BMI:   not performed    Exam:   Pt appears well; alert and oriented x 3; appropriate affect. HEENT: PERRLA, anicteric, oropharynx clear, no JVD, adenopathy or thyromegaly. No carotid bruits or radiated murmur. Lungs: decreased breath sounds bilaterally, no wheezes, rhonchi, or rales. Heart: regular rate and rhythm. Tachycardic. No murmur, rubs, gallops  Abdomen: soft, nontender, nondistended, normal bowel sounds, no hepatosplenomegaly or masses. G tube in place. Extremities: Right foot with dressing in place. Clean and dry. Review of Data:  Labs:  Hospital Outpatient Visit on 08/15/2018   Component Date Value Ref Range Status    WBC 08/15/2018 24.2* 4.6 - 13.2 K/uL Final    RBC 08/15/2018 3.80* 4.20 - 5.30 M/uL Final    HGB 08/15/2018 12.5  12.0 - 16.0 g/dL Final    HCT 08/15/2018 39.0  35.0 - 45.0 % Final    MCV 08/15/2018 102.6* 74.0 - 97.0 FL Final    MCH 08/15/2018 32.9  24.0 - 34.0 PG Final    MCHC 08/15/2018 32.1  31.0 - 37.0 g/dL Final    RDW 08/15/2018 18.2* 11.6 - 14.5 % Final    PLATELET 51/49/7201 561  135 - 420 K/uL Final    MPV 08/15/2018 11.1  9.2 - 11.8 FL Final    NEUTROPHILS 08/15/2018 92* 40 - 73 % Final    LYMPHOCYTES 08/15/2018 6* 21 - 52 % Final    MONOCYTES 08/15/2018 2* 3 - 10 % Final    EOSINOPHILS 08/15/2018 0  0 - 5 % Final    BASOPHILS 08/15/2018 0  0 - 2 % Final    ABS. NEUTROPHILS 08/15/2018 22.0* 1.8 - 8.0 K/UL Final    ABS.  LYMPHOCYTES 08/15/2018 1.5  0.9 - 3.6 K/UL Final    ABS. MONOCYTES 08/15/2018 0.6  0.05 - 1.2 K/UL Final    ABS. EOSINOPHILS 08/15/2018 0.0  0.0 - 0.4 K/UL Final    ABS. BASOPHILS 08/15/2018 0.1  0.0 - 0.1 K/UL Final    DF 08/15/2018 AUTOMATED    Final    Sodium 08/15/2018 138  136 - 145 mmol/L Final    Potassium 08/15/2018 4.4  3.5 - 5.5 mmol/L Final    Chloride 08/15/2018 105  100 - 108 mmol/L Final    CO2 08/15/2018 24  21 - 32 mmol/L Final    Anion gap 08/15/2018 9  3.0 - 18 mmol/L Final    Glucose 08/15/2018 167* 74 - 99 mg/dL Final    BUN 08/15/2018 10  7.0 - 18 MG/DL Final    Creatinine 08/15/2018 0.90  0.6 - 1.3 MG/DL Final    BUN/Creatinine ratio 08/15/2018 11* 12 - 20   Final    GFR est AA 08/15/2018 >60  >60 ml/min/1.73m2 Final    GFR est non-AA 08/15/2018 >60  >60 ml/min/1.73m2 Final    Calcium 08/15/2018 8.6  8.5 - 10.1 MG/DL Final    Bilirubin, total 08/15/2018 0.6  0.2 - 1.0 MG/DL Final    ALT (SGPT) 08/15/2018 13  13 - 56 U/L Final    AST (SGOT) 08/15/2018 34  15 - 37 U/L Final    Alk. phosphatase 08/15/2018 366* 45 - 117 U/L Final    Protein, total 08/15/2018 7.4  6.4 - 8.2 g/dL Final    Albumin 08/15/2018 2.5* 3.4 - 5.0 g/dL Final    Globulin 08/15/2018 4.9* 2.0 - 4.0 g/dL Final    A-G Ratio 08/15/2018 0.5* 0.8 - 1.7   Final    GGT 08/15/2018 251* 0 - 60 IU/L Final    LIPID PROFILE 08/15/2018        Final    Cholesterol, total 08/15/2018 186  <200 MG/DL Final    Triglyceride 08/15/2018 182* <150 MG/DL Final    HDL Cholesterol 08/15/2018 72* 40 - 60 MG/DL Final    LDL, calculated 08/15/2018 77.6  0 - 100 MG/DL Final    VLDL, calculated 08/15/2018 36.4  MG/DL Final    CHOL/HDL Ratio 08/15/2018 2.6  0 - 5.0   Final    TSH 08/15/2018 1.30  0.36 - 3.74 uIU/mL Final    Vitamin D 25-Hydroxy 08/15/2018 33.3  30 - 100 ng/mL Final     Health Maintenance:  Screening:    Mammogram: negative (10/2017)   PAP smear: s/p PRASHANTH. No further screening.    Colorectal: colonoscopy (1/2013) normal. Dr. Nathalia Stockton. Due 2023. Depression: none   DM (HbA1c/FPG): FPG 82 (2/2018)   Hepatitis C: negative (3/2014) [address]: none   DEXA: osteoporosis (3/2017). IV Reclast given 11/2017. Smoking: resumed smoking 1 ppd   Vitamin D: 33.3 (8/2018)   Medicare Wellness: 2/14/2018      Impression:  Patient Active Problem List   Diagnosis Code    Melanoma right scapula C43.9    COPD (chronic obstructive pulmonary disease) ([facility] J44.9    Carotid artery disease ([facility] [facility] bilateral moderate I77.9    Celiac artery stenosis, status post stent I77.4    Severe protein-calorie malnutrition ([facility] E43    Squamous cell carcinoma of oropharynx, with PEG tube and tracheostomy C10.9    Anemia D64.9    Esophageal stricture K22.2    Vitamin D deficiency E55.9    Acquired hypothyroidism E03.9    Hyponatremia E87.1    Carpal tunnel syndrome, left G56.02    GI bleed K92.2    Dysphagia, 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    S/P percutaneous endoscopic gastrostomy (PEG) tube placement ([facility]) Z93.1    History of radiation to head and neck region Z92.3    Current smoker F17.200    Essential hypertension I10    Multiple pulmonary nodules R91.8    Closed avulsion fracture of greater trochanter of femur ([facility] S72.113A    Seizures ([facility]) R56.9    Age-related osteoporosis with current pathological fracture M80.00XA    Foot dislocation, right, sequela S93.304S    Controlled substance agreement signed Z79.899    Bone infection, ankle/foot ([facility]) M86.9    Elevated alkaline phosphatase level R74.8    GI bleed due to NSAIDs K92.2, T39.395A       Plan:  1. Acute sinusitis. Symptoms consistent with upper respiratory infection with post nasal drainage, cough productive of green sputum, headache, and facial pain. Symptoms present for 2-3 weeks. No fever or chills, but WBC elevated to 24.2 with left shift (PMNs 92%). Will treat with Augmentin for 10 days. Follow closely.     2. Non-pressure chronic ulcer of right foot with bone necrosis and poor wound healing. Patient with right great toe dislocation and metatarsal fracture requiring ORIF. Following cast removal, noted deep ulcer with exposed hardware and bone. Removal of hardware revealed underlying osteomyelitis and bone necrosis. Received IV Levaquin and vancomycin for 6 weeks. Repeat wound culture with coag negative staph and candida albicans, treated with Bactrim and fluconazole for 14 days. Wound vac discontinued and now receiving sharp debridement weekly and dressed with lambs wool. Poor nutritional status with severe protein caloric malnutrition contributing to poor wound healing. Also, continuing to smoke. Being followed by Infectious disease, ortho, and plastic surgery at [facility]. Being followed weekly in wound clinic. Continue to follow. 3. Upper GI bleed due to NSAIDS. Presented with dark stool, lightheadedness, and abdominal pain. Initial Hb 7.9 and endoscopic evaluation showed distal esophagitis as most likely source for bleed. Treated with IV Protonix, and received 1 U PRBCs with IV iron (Venofer) infusion. Repeat Hb normal at 12.5/ Hct 39.0. Instructed to avoid NSAIDS. Will continue to follow. 4. Seizures. Unclear but concerning as presented with multiple witnessed episodes in ED thought to represent status epilepticus requiring loading with Keppra to terminate. Has had no recurrence of seizures since Keppra initiated. Head CT scan was negative, but she has been unable to have brain MRI due to anxiety. Evaluated by Dr. Evangelista, and repeat EEG sleep deprived normal. Continues on Keprra. 5. S/P nondisplaced fracture of right femur, greater trochanter. No further difficulty, but non weight bearing due to foot. Follow. 6. Oropharyngeal carcinoma s/p XRT/chemo. Currently in remission. Recent PET scan and neck/chest CT scans with stable pulmonary nodules, and otherwise without clinical evidence of active disease. She is being followed closely by Dr. Paulina Rivera at [facility] and Dr. Boy Nichols.    7. Esophageal stricture. Currently receiving all nutrition via G-tube. On intermittent feeds. Weight decreased eight pounds since 9/2017. Severe protein calorie malnutrition. Receiving [employer] protein supplement. Not able to weigh patient as remains non weight bearing. Thus, difficult to determine if making any progress. Follow. 8. Osteoporosis. Significant progression of disease since prior exam in 2010, particularly in right femoral neck. Now with fracture of right proximal femur following mechanical fall. Unclear if safe to treat with biphosphonates given prior head and neck irradiation. Concern for possible increase risk of osteonecrosis with biphosphonate or denosumab use. Evaluated by Dr. Renny Groe and risk of fracture felt to be higher than risk of osteonecrosis. Received dose of IV Reclast with plans for repeat annually. Due 11/2018. Continue calcium and Vitamin D.  9. Elevated LFT's. Alkaline phosphatase level remains significantly increased, but improved and may be due to chronic osteomyelitis. Transaminases have normalized, but GGT remains elevated. Review of chest CT scan in 1/2017 and 7/2017 showed hepatic steatosis and mildly dilated central biliary duct without intrahepatic ductal dilatation (but not completely visualized). Keppra also reported to cause increase in LFT's. Hepatitis panel, ferritin, iron studies, ceruloplasmin, AMA, and ASMA were all negative. GGT elevated confirming hepatic source of elevated alkaline phosphatase. Hepatic ultrasound with evidence of increased hepatic echotexture suggestive of nonspecific hepatocellular pathology such as steatosis. Follow. 10. Hypothyroidism. TSH today with evidence of adequate replacement on current Synthroid dose. Continue to follow. 11. H/O mesenteric ischemia. 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 Being followed by Dr. Umana January. Continue to follow.   12. Carotid stenoses, bilateral. Prior duplex scan revealing severe stenoses bilaterally, but angiogram in 7/2016 by Dr. Christy Sy did not confirm this. Difficulty with duplex scan most likely due to prior neck irradiation. However, most recent duplex in 6/2017 showing only moderate stenosis. Follow. 13. COPD. Currently well controlled. On Flovent Diskus but reports not needing to use Duoneb nebulizers. Followed by Dr. Bryn Abreu. Continue to follow. 14. Severe protein calorie malnutrition. Recommended increasing number of cans of 2 dez HN gradually. Reports poor appetite, but given medication to help with this by Dr. Adeel Fernandez. 15. Carpal tunnel syndrome/ headaches. Being followed by Dr. Shawna Diez. On gabapentin and using a left arm splint. Follow. 16. Multiple pulmonary nodules. Stable on most recent CT scan in 1/2017. Being followed by Dr. Amanda De La Torre. 17. Smoking cessation. Discussed at length with the patient, including benefits of improved lung function as well as decreased risk of cardiovascular disease and cancer. Medication and non-pharmacologic options explored. Trial of Wellbutrin after last visit, but developed side effects. Discussed other options, including nicotine patches, but patient not currently wishing to stop. Will continue to follow. Total time spent on topic 6 minutes. 18. Health maintenance. Received Pneumovax. Would not give Shingrix currently given multiple other issues . Other immunizations up to date. Mammogram up to date. Vitamin D level has been normal. To continue maintenance dose supplement. Colorectal cancer screening normal in 1/2013. Total time: 40 minutes spent with the patient in face-to-face consultation of which greater than 50% was spent on counseling, answering questions and/or coordination of care. Complex medical review and management performed. Patient understands recommendations and agrees with plan. Follow-up in 3 weeks.       [facility] Outpatient Visit on 08/22/2018   Component Date Value Ref Range Status    WBC 08/22/2018 10.5  4.6 - 13.2 K/uL Final    RBC 08/22/2018 3.82* 4.20 - 5.30 M/uL Final    HGB 08/22/2018 12.7  12.0 - 16.0 g/dL Final    HCT 08/22/2018 38.6  35.0 - 45.0 % Final    MCV 08/22/2018 101.0* 74.0 - 97.0 FL Final    MCH 08/22/2018 33.2  24.0 - 34.0 PG Final    MCHC 08/22/2018 32.9  31.0 - 37.0 g/dL Final    RDW 08/22/2018 17.5* 11.6 - 14.5 % Final    PLATELET 84/92/8694 518* 135 - 420 K/uL Final    MPV 08/22/2018 10.3  9.2 - 11.8 FL Final    NEUTROPHILS 08/22/2018 84* 40 - 73 % Final    LYMPHOCYTES 08/22/2018 7* 21 - 52 % Final    MONOCYTES 08/22/2018 7  3 - 10 % Final    EOSINOPHILS 08/22/2018 1  0 - 5 % Final    BASOPHILS 08/22/2018 1  0 - 2 % Final    ABS. NEUTROPHILS 08/22/2018 8.8* 1.8 - 8.0 K/UL Final    ABS. LYMPHOCYTES 08/22/2018 0.8* 0.9 - 3.6 K/UL Final    ABS. MONOCYTES 08/22/2018 0.7  0.05 - 1.2 K/UL Final    ABS. EOSINOPHILS 08/22/2018 0.1  0.0 - 0.4 K/UL Final    ABS. BASOPHILS 08/22/2018 0.1  0.0 - 0.1 K/UL Final    DF 08/22/2018 AUTOMATED    Final    Hemoglobin A1c 08/22/2018 4.3  4.2 - 5.6 % Final    Est. average glucose 08/22/2018 Cannot be calculated  mg/dL Final    Sodium 08/22/2018 135* 136 - 145 mmol/L Final    Potassium 08/22/2018 4.2  3.5 - 5.5 mmol/L Final    Chloride 08/22/2018 101  100 - 108 mmol/L Final    CO2 08/22/2018 25  21 - 32 mmol/L Final    Anion gap 08/22/2018 9  3.0 - 18 mmol/L Final    Glucose 08/22/2018 77  74 - 99 mg/dL Final    BUN 08/22/2018 6* 7.0 - 18 MG/DL Final    Creatinine 08/22/2018 0.60  0.6 - 1.3 MG/DL Final    BUN/Creatinine ratio 08/22/2018 10* 12 - 20   Final    GFR est AA 08/22/2018 >60  >60 ml/min/1.73m2 Final    GFR est non-AA 08/22/2018 >60  >60 ml/min/1.73m2 Final    Calcium 08/22/2018 9.3  8.5 - 10.1 MG/DL Final    PERIPHERAL SMEAR 08/22/2018 PATHOLOGY REPORT   Final     MACROCYTOSIS,ABUNDANT PLATELETS, FEW BAND   NEUTROPHILS PRESENT.    NO IMMATURE OR ATYPICAL CELLS SEEN      Reviewed labs from visit.  CBC showing improvement in WBC to 10.5 (although left shift remains and peripheral smear with bands evident)  Normal renal function with creatinine 0.60/ eGFR >60. HbA1c 4.3.

## 2018-09-10 NOTE — TELEPHONE ENCOUNTER
[facility] reports the last fill date for Morphine as 2018. There appears to be no inconsistencies in regards to the prescribing of this medication.      Last Visit: 2018 with MD Ruthie Leonard    Next Appointment: 2018 with MD Ruthie Leonard   Previous Refill Encounters: 2018 per MD Martinez 100 mL    Requested Prescriptions     Pending Prescriptions Disp Refills    morphine 10 mg/5 mL oral solution 100 mL 0     Si.5 or 5 mL by peg tube every 8 hours as needed for pain

## 2018-09-17 NOTE — TELEPHONE ENCOUNTER
They were given a printed rX by neurology in may with 11 refills. They should stick to 1 provider refilling this medication.

## 2018-09-17 NOTE — TELEPHONE ENCOUNTER
The pharmacy says the script they received only had 5 refills so she needs it now- call  when this has been done

## 2018-09-21 NOTE — MR AVS SNAPSHOT
[address] [facility] [address] 
 
 
 [address], [facility] [address] [facility] [address] 
[phone] Patient: Laura Cervantes MRN: [id number] Visit Information Date & Time Provider Department Dept. Phone Encounter #  
 9/21/2018  1:30 PM Yanet Ramirez MD [facility] [address] [place] [address] [phone] Follow-up Instructions Return in about 3 months (around 12/21/2018), or if symptoms worsen or fail to improve. Your Appointments 9/27/2018  9:30 AM  
Follow Up with Nevin Lennox, PA [facility] ([address] [place] [address]) Appt Note: 6 MONTH FOLLOW UP AFTER CAROTID; patient rescheduled appt; cld pt to confirm appt, no answer left a generic message for pt to call us to confirm; sp w/pts sp to confirm appt date, time & location; r/s; [address] 136 [address] [facility] [address]  
[phone] [facility] [facility] [address] [place] [address]  
  
    
 1/2/2019  9:30 AM  
LAB with [facility] NURSE VISIT [facility] [address] [place] [address] ([facility]) Appt Note: labs [address], [facility] Antony Benes [address]  
  
   
 [address], [place]  
  
    
 1/9/2019 11:00 AM  
Office Visit with Yanet Ramirez MD  
[facility] [address]) Appt Note: ov per sarris [address], [facility] Antony Benes [address]  
  
   
 [address], [place] Upcoming Health Maintenance Date Due Influenza Age 5 to Adult 8/1/2018 ZOSTER VACCINE AGE 60> 2/28/2019* PAP AKA CERVICAL CYTOLOGY 9/27/2019* Pneumococcal 19-64 Highest Risk (3 of 3 - PCV13) 2/28/2023* MEDICARE YEARLY EXAM 2/15/2019 BREAST CANCER SCRN MAMMOGRAM 10/18/2019 COLONOSCOPY 1/10/2023 DTaP/Tdap/Td series (2 - Td) 3/6/2027 *Topic was postponed. The date shown is not the original due date. Allergies as of 9/21/2018  Review Complete On: 9/21/2018 By: Makayla Moore LPN Severity Noted Reaction Type Reactions Megestrol Medium 02/14/2018    Itching Percocet [Oxycodone-acetaminophen]  12/29/2013    Itching, Hives Percodan [Oxycodone-aspirin]  05/21/2014    Rash, Itching Plavix [Clopidogrel]  04/10/2013    Rash, Hives Current Immunizations  Reviewed on 10/13/2016 Name Date Influenza Vaccine (Quad) PF  Incomplete, 9/5/2017 10:30 AM, 10/13/2016  3:26 PM, 11/17/2015  3:55 PM  
 Influenza Vaccine PF 11/4/2014, 10/21/2013  1:03 PM  
 Pneumococcal Polysaccharide (PPSV-23) 10/13/2016  3:52 PM  
 Tdap 3/6/2017 Not reviewed this visit You Were Diagnosed With   
  
 Codes Comments Encounter for immunization    -  Primary ICD-10-CM: M67 ICD-9-CM: V03.89 Essential hypertension     ICD-10-CM: I10 
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 ([facility]     ICD-10-CM: R23.296 ICD-9-CM: 707.15 Severe protein-calorie malnutrition ([facility]     ICD-10-CM: B85 ICD-9-CM: 037 S/P percutaneous endoscopic gastrostomy (PEG) tube placement ([facility]     ICD-10-CM: Z93.1 ICD-9-CM: V44.1 Age-related osteoporosis with current pathological fracture, sequela     ICD-10-CM: M80.00XS ICD-9-CM: 905.5, 733.01 Disorder of bone density and structure, unspecified     ICD-10-CM: M85.9 ICD-9-CM: 733.90 Vitals BP Pulse Temp Resp Height(growth percentile) OB Status 112/68 (BP 1 Location: Left arm, BP Patient Position: Sitting) 96 98.9 °F (37.2 °C) (Oral) 14 5' 2\" (1.575 m) Postmenopausal  
 Smoking Status Current Every Day Smoker Vitals History Preferred Pharmacy Pharmacy Name Phone [address] [place] [address] [phone] Your Updated Medication List  
  
   
This list is accurate as of 9/21/18  2:46 PM.  Always use your most recent med list.  
  
  
  
  
 albuterol 2.5 mg /3 mL (0.083 %) nebulizer solution Commonly known as:  PROVENTIL VENTOLIN  
3 mL by Nebulization route every four (4) hours as needed for Wheezing. cyanocobalamin 500 mcg tablet Commonly known as:  VITAMIN B12  
500 mcg by PEG Tube route daily. Via peg  
  
 cyclobenzaprine 5 mg tablet Commonly known as:  FLEXERIL  
1 Tab by Per G Tube route three (3) times daily as needed for Muscle Spasm(s). gabapentin 300 mg capsule Commonly known as:  NEURONTIN  
1 Cap by Per G Tube route three (3) times daily. levETIRAcetam 100 mg/mL solution Commonly known as:  KEPPRA TAKE 2 TEASPOONSFULS (10 ML) VIA G-TUBE 2 TIMES A DAY  
  
 levothyroxine 75 mcg tablet Commonly known as:  SYNTHROID  
TAKE 1/2 TABLET VIA G-TUBE ONCE DAILY BEFORE BREAKFAST  
  
 lidocaine HCl 3 % topical cream  
Commonly known as:  XYLOCAINE Apply  to affected area two (2) times a day. Indications: WITH DESITIN  
  
 melatonin 3 mg tablet 1 Tab by Per G Tube route nightly as needed. morphine 10 mg/5 mL oral solution 2.5 or 5 mL by peg tube every 8 hours as needed for pain  
  
 multivitamin liquid Commonly known as:  THERAGRAN  
10 mL by PEG Tube route daily. omeprazole 2.5 mg Sudr delayed release suspension Commonly known as:  PRILOSEC  
20 mg by Per G Tube route two (2) times a day. potassium chloride 20 mEq/15 mL solution Commonly known as:  KAON 10% Take 7.5 mL by mouth daily. PROSOURCE NO CARB 15-60 gram-kcal/30 mL Lipk Generic drug:  amino acids-protein hydrolys Take 30 mL by mouth daily. VITAMIN D3 2,000 unit Tab Generic drug:  cholecalciferol (vitamin D3) 1 Tab by PEG Tube route daily. We Performed the Following INFLUENZA VIRUS VAC QUAD,SPLIT,PRESV FREE SYRINGE IM [id number] CPT(R)] Follow-up Instructions Return in about 3 months (around 12/21/2018), or if symptoms worsen or fail to improve. Patient Instructions Preventing Falls: Care Instructions Your Care Instructions Getting around your home safely can be a challenge if you have injuries or health problems that make it easy for you to fall. Loose rugs and furniture in walkways are among the dangers for many older people who have problems walking or who have poor eyesight. People who have conditions such as arthritis, osteoporosis, or dementia also have to be careful not to fall. You can make your home safer with a few simple measures. Follow-up care is a key part of your treatment and safety. Be sure to make and go to all appointments, and call your doctor if you are having problems. It's also a good idea to know your test results and keep a list of the medicines you take. How can you care for yourself at home? Taking care of yourself · You may get dizzy if you do not drink enough water. To prevent dehydration, drink plenty of fluids, enough so that your urine is light yellow or clear like water. Choose water and other caffeine-free clear liquids. If you have kidney, heart, or liver disease and have to limit fluids, talk with your doctor before you increase the amount of fluids you drink. · Exercise regularly to improve your strength, muscle tone, and balance. Walk if you can. Swimming may be a good choice if you cannot walk easily. · Have your vision and hearing checked each year or any time you notice a change. If you have trouble seeing and hearing, you might not be able to avoid objects and could lose your balance. · Know the side effects of the medicines you take. Ask your doctor or pharmacist whether the medicines you take can affect your balance. Sleeping pills or sedatives can affect your balance. · Limit the amount of alcohol you drink. Alcohol can impair your balance and other senses. · Ask your doctor whether calluses or corns on your feet need to be removed. If you wear loose-fitting shoes because of calluses or corns, you can lose your balance and fall. · Talk to your doctor if you have numbness in your feet. Preventing falls at home · Remove raised doorway thresholds, throw rugs, and clutter. Repair loose carpet or raised areas in the floor. · Move furniture and electrical cords to keep them out of walking paths. · Use nonskid floor wax, and wipe up spills right away, especially on ceramic tile floors. · If you use a walker or cane, put rubber tips on it. If you use crutches, clean the bottoms of them regularly with an abrasive pad, such as steel wool. · Keep your house well lit, especially Tivis Gaudier, and outside walkways. Use night-lights in areas such as hallways and bathrooms. Add extra light switches or use remote switches (such as switches that go on or off when you clap your hands) to make it easier to turn lights on if you have to get up during the night. · Install sturdy handrails on stairways. · Move items in your cabinets so that the things you use a lot are on the lower shelves (about waist level). · Keep a cordless phone and a flashlight with new batteries by your bed. If possible, put a phone in each of the main rooms of your house, or carry a cell phone in case you fall and cannot reach a phone. Or, you can wear a device around your neck or wrist. You push a button that sends a signal for help. · Wear low-heeled shoes that fit well and give your feet good support. Use footwear with nonskid soles. Check the heels and soles of your shoes for wear. Repair or replace worn heels or soles. · Do not wear socks without shoes on wood floors. · Walk on the grass when the sidewalks are slippery.  If you live in an area that gets snow and ice in the winter, sprinkle salt on slippery steps and sidewalks. Preventing falls in the bath · Install grab bars and nonskid mats inside and outside your shower or tub and near the toilet and sinks. · Use shower chairs and bath benches. · Use a hand-held shower head that will allow you to sit while showering. · Get into a tub or shower by putting the weaker leg in first. Get out of a tub or shower with your strong side first. 
· Repair loose toilet seats and consider installing a raised toilet seat to make getting on and off the toilet easier. · Keep your bathroom door unlocked while you are in the shower. Where can you learn more? Go to http://dayron-raj.info/. Enter [id number] [id number] in the search box to learn more about \"Preventing Falls: Care Instructions. \" Current as of: May 12, 2017 Content Version: 11.7 © 0369-7464 [employer]. Care instructions adapted under license by [employer] (which disclaims liability or warranty for this information). If you have questions about a medical condition or this instruction, always ask your healthcare professional. [place] [address] any warranty or liability for your use of this information. Learning About Benefits From Quitting Smoking How does quitting smoking make you healthier? If you're thinking about quitting smoking, you may have a few reasons to be smoke-free. Your health may be one of them. · When you quit smoking, you lower your risks for cancer, lung disease, heart attack, stroke, blood vessel disease, and blindness from macular degeneration. · When you're smoke-free, you get sick less often, and you heal faster. You are less likely to get colds, flu, bronchitis, and pneumonia. · As a nonsmoker, you may find that your mood is better and you are less stressed. When and how will you feel healthier?  
Quitting has real health benefits that start from day 1 of being smoke-free. And the longer you stay smoke-free, the healthier you get and the better you feel. The first hours · After just 20 minutes, your blood pressure and heart rate go down. That means there's less stress on your heart and blood vessels. · Within 12 hours, the level of carbon monoxide in your blood drops back to normal. That makes room for more oxygen. With more oxygen in your body, you may notice that you have more energy than when you smoked. After 2 weeks · Your lungs start to work better. · Your risk of heart attack starts to drop. After 1 month · When your lungs are clear, you cough less and breathe deeper, so it's easier to be active. · Your sense of taste and smell return. That means you can enjoy food more than you have since you started smoking. Over the years · After 1 year, your risk of heart disease is half what it would be if you kept smoking. · After 5 years, your risk of stroke starts to shrink. Within a few years after that, it's about the same as if you'd never smoked. · After 10 years, your risk of dying from lung cancer is cut by about half. And your risk for many other types of cancer is lower too. How would quitting help others in your life? When you quit smoking, you improve the health of everyone who now breathes in your smoke. · Their heart, lung, and cancer risks drop, much like yours. · They are sick less. For babies and small children, living smoke-free means they're less likely to have ear infections, pneumonia, and bronchitis. · If you're a woman who is or will be pregnant someday, quitting smoking means a healthier . · Children who are close to you are less likely to become adult smokers. Where can you learn more? Go to http://dayron-raj.info/. Enter [id number] in the search box to learn more about \"Learning About Benefits From Quitting Smoking. \" Current as of: 2017 Content Version: 117 © 1482-6722 [employer]wise[employer]. Care instructions adapted under license by [employer] (which disclaims liability or warranty for this information). If you have questions about a medical condition or this instruction, always ask your healthcare professional. [address] any warranty or liability for your use of this information. Stopping Smoking: Care Instructions Your Care Instructions Cigarette smokers crave the nicotine in cigarettes. Giving it up is much harder than simply changing a habit. Your body has to stop craving the nicotine. It is hard to quit, but you can do it. There are many tools that people use to quit smoking. You may find that combining tools works best for you. There are several steps to quitting. First you get ready to quit. Then you get support to help you. After that, you learn new skills and behaviors to become a nonsmoker. For many people, a necessary step is getting and using medicine. Your doctor will help you set up the plan that best meets your needs. You may want to attend a smoking cessation program to help you quit smoking. When you choose a program, look for one that has proven success. Ask your doctor for ideas. You will greatly increase your chances of success if you take medicine as well as get counseling or join a cessation program. 
Some of the changes you feel when you first quit tobacco are uncomfortable. Your body will miss the nicotine at first, and you may feel short-tempered and grumpy. You may have trouble sleeping or concentrating. Medicine can help you deal with these symptoms. You may struggle with changing your smoking habits and rituals. The last step is the tricky one: Be prepared for the smoking urge to continue for a time. This is a lot to deal with, but keep at it. You will feel better. Follow-up care is a key part of your treatment and safety.  Be sure to make and go to all appointments, and call your doctor if you are having problems. It's also a good idea to know your test results and keep a list of the medicines you take. How can you care for yourself at home? · Ask your family, friends, and coworkers for support. You have a better chance of quitting if you have help and support. · Join a support group, such as Nicotine Anonymous, for people who are trying to quit smoking. · Consider signing up for a smoking cessation program, such as the American Lung Association's Freedom from Smoking program. 
· Get text messaging support. Go to the website at www.smokefree. gov to sign up for the [facility] program. 
· Set a quit date. Pick your date carefully so that it is not right in the middle of a big deadline or stressful time. Once you quit, do not even take a puff. Get rid of all ashtrays and lighters after your last cigarette. Clean your house and your clothes so that they do not smell of smoke. · Learn how to be a nonsmoker. Think about ways you can avoid those things that make you reach for a cigarette. ¨ Avoid situations that put you at greatest risk for smoking. For some people, it is hard to have a drink with friends without smoking. For others, they might skip a coffee break with coworkers who smoke. ¨ Change your daily routine. Take a different route to work or eat a meal in a different place. · Cut down on stress. Calm yourself or release tension by doing an activity you enjoy, such as reading a book, taking a hot bath, or gardening. · Talk to your doctor or pharmacist about nicotine replacement therapy, which replaces the nicotine in your body. You still get nicotine but you do not use tobacco. Nicotine replacement products help you slowly reduce the amount of nicotine you need. These products come in several forms, many of them available over-the-counter: ¨ Nicotine patches ¨ Nicotine gum and lozenges ¨ Nicotine inhaler · Ask your doctor about bupropion (Wellbutrin) or varenicline (Chantix), which are prescription medicines. They do not contain nicotine. They help you by reducing withdrawal symptoms, such as stress and anxiety. · Some people find hypnosis, acupuncture, and massage helpful for ending the smoking habit. · Eat a healthy diet and get regular exercise. Having healthy habits will help your body move past its craving for nicotine. · Be prepared to keep trying. Most people are not successful the first few times they try to quit. Do not get mad at yourself if you smoke again. Make a list of things you learned and think about when you want to try again, such as next week, next month, or next year. Where can you learn more? Go to http://dayron-raj.info/. Enter E454 in the search box to learn more about \"Stopping Smoking: Care Instructions. \" Current as of: November 29, 2017 Content Version: 11.7 © 9313-2963 [employer] ([employer]). Care instructions adapted under license by [employer] (which disclaims liability or warranty for this information). If you have questions about a medical condition or this instruction, always ask your healthcare professional. [address] any warranty or liability for your use of this information. Introducing [facility]! [facility] introduces [employer] patient portal. Now you can access parts of your medical record, email your doctor's office, and request medication refills online. 1. In your internet browser, go to [email or web address] 2. Click on the First Time User? Click Here link in the Sign In box. You will see the New Member Sign Up page. 3. Enter your [employer] Access Code exactly as it appears below. You will not need to use this code after youve completed the sign-up process. If you do not sign up before the expiration date, you must request a new code. · [employer] Access Code: [email or web address] Expires: 12/3/2018 10:02 AM 
 
4. Enter the last four digits of your Social Security Number (xxxx) and Date of Birth (mm/dd/yyyy) as indicated and click Submit. You will be taken to the next sign-up page. 5. Create a [employer] ID. This will be your [employer] login ID and cannot be changed, so think of one that is secure and easy to remember. 6. Create a [employer] password. You can change your password at any time. 7. Enter your Password Reset Question and Answer. This can be used at a later time if you forget your password. 8. Enter your e-mail address. You will receive e-mail notification when new information is available in [address] 9. Click Sign Up. You can now view and download portions of your medical record. 10. Click the Download Summary menu link to download a portable copy of your medical information. If you have questions, please visit the Frequently Asked Questions section of the [employer] website. Remember, [employer] is NOT to be used for urgent needs. For medical emergencies, dial 911. Now available from your iPhone and Android! Please provide this summary of care documentation to your next provider. Your primary care clinician is listed as Augusta Richter. If you have any questions after today's visit, please call [phone].

## 2018-09-21 NOTE — PATIENT INSTRUCTIONS
Preventing Falls: Care Instructions Your Care Instructions Getting around your home safely can be a challenge if you have injuries or health problems that make it easy for you to fall. Loose rugs and furniture in walkways are among the dangers for many older people who have problems walking or who have poor eyesight. People who have conditions such as arthritis, osteoporosis, or dementia also have to be careful not to fall. You can make your home safer with a few simple measures. Follow-up care is a key part of your treatment and safety. Be sure to make and go to all appointments, and call your doctor if you are having problems. It's also a good idea to know your test results and keep a list of the medicines you take. How can you care for yourself at home? Taking care of yourself · You may get dizzy if you do not drink enough water. To prevent dehydration, drink plenty of fluids, enough so that your urine is light yellow or clear like water. Choose water and other caffeine-free clear liquids. If you have kidney, heart, or liver disease and have to limit fluids, talk with your doctor before you increase the amount of fluids you drink. · Exercise regularly to improve your strength, muscle tone, and balance. Walk if you can. Swimming may be a good choice if you cannot walk easily. · Have your vision and hearing checked each year or any time you notice a change. If you have trouble seeing and hearing, you might not be able to avoid objects and could lose your balance. · Know the side effects of the medicines you take. Ask your doctor or pharmacist whether the medicines you take can affect your balance. Sleeping pills or sedatives can affect your balance. · Limit the amount of alcohol you drink. Alcohol can impair your balance and other senses. · Ask your doctor whether calluses or corns on your feet need to be removed.  If you wear loose-fitting shoes because of calluses or corns, you can lose your balance and fall. · Talk to your doctor if you have numbness in your feet. Preventing falls at home · Remove raised doorway thresholds, throw rugs, and clutter. Repair loose carpet or raised areas in the floor. · Move furniture and electrical cords to keep them out of walking paths. · Use nonskid floor wax, and wipe up spills right away, especially on ceramic tile floors. · If you use a walker or cane, put rubber tips on it. If you use crutches, clean the bottoms of them regularly with an abrasive pad, such as steel wool. · Keep your house well lit, especially [employer], and outside walkways. Use night-lights in areas such as hallways and bathrooms. Add extra light switches or use remote switches (such as switches that go on or off when you clap your hands) to make it easier to turn lights on if you have to get up during the night. · Install sturdy handrails on stairways. · Move items in your cabinets so that the things you use a lot are on the lower shelves (about waist level). · Keep a cordless phone and a flashlight with new batteries by your bed. If possible, put a phone in each of the main rooms of your house, or carry a cell phone in case you fall and cannot reach a phone. Or, you can wear a device around your neck or wrist. You push a button that sends a signal for help. · Wear low-heeled shoes that fit well and give your feet good support. Use footwear with nonskid soles. Check the heels and soles of your shoes for wear. Repair or replace worn heels or soles. · Do not wear socks without shoes on wood floors. · Walk on the grass when the sidewalks are slippery. If you live in an area that gets snow and ice in the winter, sprinkle salt on slippery steps and sidewalks. Preventing falls in the bath · Install grab bars and nonskid mats inside and outside your shower or tub and near the toilet and sinks. · Use shower chairs and bath benches. · Use a hand-held shower head that will allow you to sit while showering. · Get into a tub or shower by putting the weaker leg in first. Get out of a tub or shower with your strong side first. 
· Repair loose toilet seats and consider installing a raised toilet seat to make getting on and off the toilet easier. · Keep your bathroom door unlocked while you are in the shower. Where can you learn more? Go to http://dayron-raj.info/. Enter 0476 [id number] in the search box to learn more about \"Preventing Falls: Care Instructions. \" Current as of: May 12, 2017 Content Version: 11.7 © 5519-1493 [employer]. Care instructions adapted under license by [employer] (which disclaims liability or warranty for this information). If you have questions about a medical condition or this instruction, always ask your healthcare professional. [place] [address] any warranty or liability for your use of this information. Learning About Benefits From Quitting Smoking How does quitting smoking make you healthier? If you're thinking about quitting smoking, you may have a few reasons to be smoke-free. Your health may be one of them. · When you quit smoking, you lower your risks for cancer, lung disease, heart attack, stroke, blood vessel disease, and blindness from macular degeneration. · When you're smoke-free, you get sick less often, and you heal faster. You are less likely to get colds, flu, bronchitis, and pneumonia. · As a nonsmoker, you may find that your mood is better and you are less stressed. When and how will you feel healthier? Quitting has real health benefits that start from day 1 of being smoke-free. And the longer you stay smoke-free, the healthier you get and the better you feel. The first hours · After just 20 minutes, your blood pressure and heart rate go down. That means there's less stress on your heart and blood vessels. · Within 12 hours, the level of carbon monoxide in your blood drops back to normal. That makes room for more oxygen. With more oxygen in your body, you may notice that you have more energy than when you smoked. After 2 weeks · Your lungs start to work better. · Your risk of heart attack starts to drop. After 1 month · When your lungs are clear, you cough less and breathe deeper, so it's easier to be active. · Your sense of taste and smell return. That means you can enjoy food more than you have since you started smoking. Over the years · After 1 year, your risk of heart disease is half what it would be if you kept smoking. · After 5 years, your risk of stroke starts to shrink. Within a few years after that, it's about the same as if you'd never smoked. · After 10 years, your risk of dying from lung cancer is cut by about half. And your risk for many other types of cancer is lower too. How would quitting help others in your life? When you quit smoking, you improve the health of everyone who now breathes in your smoke. · Their heart, lung, and cancer risks drop, much like yours. · They are sick less. For babies and small children, living smoke-free means they're less likely to have ear infections, pneumonia, and bronchitis. · If you're a woman who is or will be pregnant someday, quitting smoking means a healthier . · Children who are close to you are less likely to become adult smokers. Where can you learn more? Go to http://dayron-raj.info/. Enter [id number] in the search box to learn more about \"Learning About Benefits From Quitting Smoking. \" Current as of: 2017 Content Version: 11.7 © 2409-9619 [employer]. Care instructions adapted under license by [employer] (which disclaims liability or warranty for this information).  If you have questions about a medical condition or this instruction, always ask your healthcare professional. [address] any warranty or liability for your use of this information. Stopping Smoking: Care Instructions Your Care Instructions Cigarette smokers crave the nicotine in cigarettes. Giving it up is much harder than simply changing a habit. Your body has to stop craving the nicotine. It is hard to quit, but you can do it. There are many tools that people use to quit smoking. You may find that combining tools works best for you. There are several steps to quitting. First you get ready to quit. Then you get support to help you. After that, you learn new skills and behaviors to become a nonsmoker. For many people, a necessary step is getting and using medicine. Your doctor will help you set up the plan that best meets your needs. You may want to attend a smoking cessation program to help you quit smoking. When you choose a program, look for one that has proven success. Ask your doctor for ideas. You will greatly increase your chances of success if you take medicine as well as get counseling or join a cessation program. 
Some of the changes you feel when you first quit tobacco are uncomfortable. Your body will miss the nicotine at first, and you may feel short-tempered and grumpy. You may have trouble sleeping or concentrating. Medicine can help you deal with these symptoms. You may struggle with changing your smoking habits and rituals. The last step is the tricky one: Be prepared for the smoking urge to continue for a time. This is a lot to deal with, but keep at it. You will feel better. Follow-up care is a key part of your treatment and safety. Be sure to make and go to all appointments, and call your doctor if you are having problems. It's also a good idea to know your test results and keep a list of the medicines you take. How can you care for yourself at home? · Ask your family, friends, and coworkers for support. You have a better chance of quitting if you have help and support. · Join a support group, such as Nicotine Anonymous, for people who are trying to quit smoking. · Consider signing up for a smoking cessation program, such as the American [employer] Association's Freedom from Smoking program. 
· Get text messaging support. Go to the website at www.smokefree. gov to sign up for the [facility] program. 
· Set a quit date. Pick your date carefully so that it is not right in the middle of a big deadline or stressful time. Once you quit, do not even take a puff. Get rid of all ashtrays and lighters after your last cigarette. Clean your house and your clothes so that they do not smell of smoke. · Learn how to be a nonsmoker. Think about ways you can avoid those things that make you reach for a cigarette. ¨ Avoid situations that put you at greatest risk for smoking. For some people, it is hard to have a drink with friends without smoking. For others, they might skip a coffee break with coworkers who smoke. ¨ Change your daily routine. Take a different route to work or eat a meal in a different place. · Cut down on stress. Calm yourself or release tension by doing an activity you enjoy, such as reading a book, taking a hot bath, or gardening. · Talk to your doctor or pharmacist about nicotine replacement therapy, which replaces the nicotine in your body. You still get nicotine but you do not use tobacco. Nicotine replacement products help you slowly reduce the amount of nicotine you need. These products come in several forms, many of them available over-the-counter: ¨ Nicotine patches ¨ Nicotine gum and lozenges ¨ Nicotine inhaler · Ask your doctor about bupropion (Wellbutrin) or varenicline (Chantix), which are prescription medicines. They do not contain nicotine. They help you by reducing withdrawal symptoms, such as stress and anxiety. · Some people find hypnosis, acupuncture, and massage helpful for ending the smoking habit. · Eat a healthy diet and get regular exercise. Having healthy habits will help your body move past its craving for nicotine. · Be prepared to keep trying. Most people are not successful the first few times they try to quit. Do not get mad at yourself if you smoke again. Make a list of things you learned and think about when you want to try again, such as next week, next month, or next year. Where can you learn more? Go to http://dayron-raj.info/. Enter T411 in the search box to learn more about \"Stopping Smoking: Care Instructions. \" Current as of: November 29, 2017 Content Version: 11.7 © 7097-9149 [employer]. Care instructions adapted under license by [employer] (which disclaims liability or warranty for this information). If you have questions about a medical condition or this instruction, always ask your healthcare professional. [address] any warranty or liability for your use of this information.

## 2018-09-21 NOTE — PROGRESS NOTES
Patient given influenza vaccine, FLUARIX Quadrivalent, in left deltoid, per verbal order from Dr. Diane Samuel. Instructed patient to sit and wait 10-20 minutes before leaving the premises so that we can watch for any complications or adverse reactions. Patient given vaccine information statement handout before vaccine was given. Patient tolerated well without adverse reactions or complications.

## 2018-09-21 NOTE — PROGRESS NOTES
1. Have you been to the ER, urgent care clinic or hospitalized since your last visit? NO.  
 
2. Have you seen or consulted any other health care providers outside of the [address] [place] [address] since your last visit (Include any pap smears or colon screening)? NO Do you have an Advanced Directive? NO Would you like information on Advanced Directive- already has information

## 2018-09-24 NOTE — PROGRESS NOTES
HPI:  
Cehyenne Richard is a 63y.o. year old female who presents today for post hospitalization follow-up. She is accompanied by her . She has a history of squamous cell carcinoma of the oropharynx, COPD, hypertension, PAD, carotid artery disease, mesenteric ischemia, pulmonary nodules, osteoporosis, seizure disorder, and carpal tunnel syndrome. She is accompanied by her . She is continuing to smoke approximately one pack per day, and her  is smoking as well. She was last seen on 8/22/2018 and was diagnosed with acute bacterial sinusitis and treated with Augmentin. She returns today and reports significant improvement with completion of antibiotics. She also reports that she is continuing to be followed in the wound clinic, but reports that her wound is slowly improving. It has decreased in size to about 4 cm long and wide as well as 4 cm deep. She continues to have difficulty with nutrition, reporting that she is only able to tolerate 1-2 cans per day of tube feeds due to diarrhea and poor appetite. She has not been weighed in some time since she is unable to bear her full weight on her right foot. She is otherwise without complaints. In 7/2018, she presented to [facility] with complaints of weakness, dark stool and suprapubic abdominal pain. She admitted taking ibuprofen and aspirin for pain. She was found to have guaiac + stool, and initial Hb 7.9 (baseline Hb 10-11). Endoscopy through her PEG was performed showing complete obstruction of the upper esophagus, and esophagitis in the distal esophagus. She was treated with IV Protonix, 1 U PRBCs, and an infusion of iron sucrose x 1 dose. She also underwent replacement of her PEG tube by Dr. Eric Guthrie. She continues to take omeprazole, and has had no further difficulty. She denies any abdominal pain, nausea, vomiting, melena, hematochezia, or change in bowel movements.  
 
In 12/2017, she sustained a closed dislocation of the right great toe at the metatarsocuneiform joint with a comminuted intraarticular fracture of the metatarsal. On 12/12/2017, she underwent open reduction and internal fixation surgery of her right great toe by Dr. Terry Connors at [facility]. She did well postoperatively, although experienced transient episodes of hypotension although asymptomatic. She was discharged on 12/14/2017. She states that she had a cast in place until 2/5/2018, and upon removal, it was noted that she had an open wound on the dorsum of her foot 3.5 cm x 1 cm and 1.4 cm deep secondary to dehiscence of the operative site. She received wound care at [facility], and it was discovered that the ulcer extended down to the metal hardware that was in place. Wound cultures were positive for rare coagulase negative Staph treated with Keflex. She was hospitalized from 2/15-2/22/2018 and underwent hardware removal by Dr. Terry Connors. She was found to have underlying osteomyelitis and bone necrosis and debridement was performed. Hospital course was complicated by C.diff colitis with sepsis requiring pressor support. She was treated with po vancomycin with good response. Total treatment recommended for 6 weeks. She also had severe electrolyte disturbances, thought to be secondary to severe diarrhea and poor nutritional status, specifically K, Mg, and phosphorus, requiring supplementation. A PICC line was placed for 6 weeks of IV Levaquin and vancomycin (completed Levaquin 3/29/18 and vancomycin 4/1/2018). A wound vac was also placed to help with healing. Wound culture (4/17/2018) showed two morphotypes of coag. negative Staph and few Candida albicans, and she was treated with Bactrim and fluconazole by LILIANA Blood [facility] ID). She continues to have difficulty with the persistent chronic ulcer and underlying bone necrosis, although she is no longer required to use the wound vac.  She is now having sharp debridement weekly in the wound clinic, with bandaging with Lambs wool. She is utilizing a wheelchair for mobility, and remains unable to fully bear weight on that foot. She continues to be followed by Dr. Paris Pérez and Dr. Melody Stoddard of plastic surgery. She was admitted to [facility] from 4/7-4/9/2017 after tripping at home and developing right hip pain. She was found to have a non-displaced comminuted fracture of the greater trochanter of the right femur. Orthopedics was consulted and non-surgical intervention and patient was subsequently discharged with plans for home physical therapy. However, several hours after getting home, patient developed two seizure like episodes manifesting as a chewing motion with upper arm shaking. After the second episode, she became unresponsive, EMS was called and she was transported back to [facility]. While in the ED, she had multiple episodes of staring with right facial twitching, and while being evaluated by Dr. Adenike Gregory of neurology, she developed a generalized tonic-clonic seizure with deviation of her eyes and head/neck to the right. She was loaded with Keppra, and the seizure was terminated. She did remain post-ictal for some time after episode. Evaluation in the ED included a stat EEG which was obtained after the seizure, and it showed interictal epileptiform discharges. Head CT scan showed atrophy and periventricular microvascular ischemia without acute changes. Chest x-ray was negative. Urine culture was positive for Klebsiella pneumoniae and she was treated with IV ceftriaxone and transitioned to po Keflex. Repeat EEG (4/10/2017) was normal without focal slowing or epileptiform activity. She remained seizure free on Keppra, and was discharged on 4/13/2017 to [address] [place], [place] [address] Floor skilled nursing [facility]. She returned home on 4/25/2017. She was evaluated by Dr. Rosi Sotomayor regarding her recent generalized seizure-like episodes, and he recommended obtaining a repeat EEG and brain MRI.  She had a sleep deprived EEG on 7/6/2017 which was a normal awake recording with prominent beta activity in the anterior central region suggestive of drug effect. She did not have the MRI performed due to anxiety despite premedication with valium. She reports that she is continuing to take [address] and has not had any recurrent seizures. 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 She underwent panendoscopy with biopsy, PEG tube placement and tracheostomy on 5/13/2013. She was subsequently treated with radiation and chemotherapy (Taxol and Cisplatin), completed 8/6/2013. She has had no clinical evidence of recurrent disease, with negative serial PET scans/ CT scans of the chest and neck. A follow-up PET scan was obtained in 2/4/2016 which revealed no tumor activity in the head or neck, but multifocal patchy nodular activity in the lungs (right > left), could be inflammatory although could also be consistent with metastases. A chest CT scan was obtained 2/26/16 revealing new bilateral pulmonary nodules, also consistent with inflammation vs. metastases. She had a repeat chest CT scan on 6/21/2016, which showed that all of the lung nodular densities had improved in appearance and decreased in size, with no new or enlarging pulmonary nodules or enlarged lymph nodes demonstrated.  She also had a neck CT scan (6/21/2016) which was negative for enlarged cervical lymph nodes, but there was an amorphous soft tissue density diffusely within the parapharyngeal fat, which was most likely post-therapy change rather than neoplastic recurrence since there was no discrete masslike focal accumulation of tissue. She had a repeat PET scan (1/21/2017) which showed marked interval improvement of multifocal hypermetabolic lesions since 2/7252, with only a mild hypermetabolic focus in the right pretracheal mediastinum, probably correlating to a tiny lymph node, clinically reactive. She had a repeat CT scan of the neck (7/11/2017) showing no residual or recurrent mass, and CT scan of the chest (7/11/2017) showing interval development of minimal hazy interstitial infiltrate, posterior subsegment left upper lobe since prior CT in 1/2017, but no definitive evidence of metastatic disease. She is being followed by Dr. Alyisa Knight [facility]NT) and Dr. Smitha Pedroza (oncology). Following her XRT and chemotherapy treatment, the tracheostomy was successfully removed. Her course has been complicated by esophageal stricture and aspiration, requiring G tube placement. She underwent multiple endoscopic dilatations under fluoroscopy using rendezvous techniques (10/2014) with reestablishment of continuity with her upper and lower esophagus. However, it was noted that she was having significant aspiration with both solids and liquids, due to poor laryngeal mobility from epiglottic and laryngeal scarring s/p XRT. In 11/29/2015, she was found to have a posterior left upper lobe cavitary mass on chest CT scan. She was admitted to [facility] and underwent bronchoscopy and bronchoalveolar lavage, which revealed no endobronchial obstruction or nodules and pathology was negative for malignant cells. Her QuantiFERON Gold was negative for TB. Cultures were positive for MSSA and pseudomonas and she was diagnosed with a left upper lobe abscess, thought to be secondary to aspiration. She was discharged home on 12/9/2015 with a PICC line and was treated with nafcillin and meropenem. She was again hospitalized at [facility] on 12/26/2015 when she presented with melena and leakage from PEG site and was found to have a Hb of 6.1. She was transfused and due to persistent leakage, had her G tube converted to a G-J tube by interventional radiology. She had severe hypokalemia, which required multiple IV/ per tube dosing.  She underwent endoscopy by  Balbir Kinney, but he was only able to pass the scope into the pharynx since the upper esophageal sphincter was completely fused shut. ENT was consulted about possibly attempting to open the proximal esophagus, and it was felt that the risk of perforation and mediastinal infection was too high, especially since she had adequete enteral access for nutrition. She was admitted to [facility] from 1/8/2016 to 1/22/2016 for a recurrent upper G-I bleed (Hb 6.8 requiring 4 U of PRBCs), acute hypoxic respiratory failure with interstitial pulmonary edema and bilateral pleural effusions, and candidemia (most likely from PICC line). She had an echocardiogram (1/15/2016) which showed normal LV size and function (EF 65%). Thoracentesis revealed effusions were transudative. She was treated with fluconazole, and had her G-J tube converted back to a G-Tube. She had been having difficulty with her G-tube cracking or becoming loose and falling out, but this resolved after she had the tube replaced with a larger tube in 2/2017 by TALHA Simon. She also has a history of peripheral vascular disease and presented in 2/2013 with weight loss and post-prandial pain. 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 Kendall Mata. She again represented with abdominal complaints in 4/2013 and was thought to have stent restenosis since she was not taking clopidogrel due to itching. In 5/2013, she underwent a celiac arteriogram which showed that the stent was patent. In 6/2015 and 6/2016, an abdominal duplex scan showed >70% stenosis of the celiac artery, but she remains asymptomatic. In 1/2018, celiac stent was found to be patent on ultrasound. In 6/21/2016, surveillance neck CT scan showed high grade bilateral internal carotid arterial stenoses.  A carotid duplex scan (6/24/2016) confirmed severe (>70%) right internal carotid artery stenosis and moderate (50-69%) left internal carotid artery stenosis. On 7/20/2016, she underwent a subclavian, cerebral, vertebral, and carotid arteriogram by Dr. Diana Olivera, which revealed multiple areas of atherosclerosis, but no significant stenoses; the right internal carotid artery had a 50% narrowing noted while all other vessels were patent. She had a repeat carotid duplex (1/2018) which showed moderate (50-69%) stenosis bilaterally. She continues to be maintained on aspirin. She has a history of hypertension that was treated in the past with lisinopril. She no longer requires medication. She also has a history of hypothyroidism and is on Synthroid. Denies any cold intolerance, hair or skin changes. She has a history of osteopenia diagnosed in 2010, with bone density scan (3/2017) showing progression to osteoporosis with T-scores: femoral neck  left -3.0 / right -2.4, and lumbar -0.9. She was treated with alendronate in 2010 for one year, but she discontinued it due to throat irritation. She continues to take calcium and Vitamin D. She was referred to see Dr. Chinyere Banks in 4/2017 for recommendations regarding treatment, given her prior head and neck irradiation and concern for possible increased risk for osteonecrosis with biphosphonate treatment. In 11/2017, she received an IV infusion of zolendronic acid with plans for repeat yearly. Even though it was recognized that she was at increased risk of osteonecrosis of the jaw given her prior head/neck irradiation, it was felt that her risk of fracture was greater. She has a history of suspected COPD, with a long history of smoking 1-2 ppd. She was being treated with Flovent and albuterol-ipratropium (Duo-neb) nebulizers; however, she states that she has not needed to use these recently. She denies any shortness of breath currently, but continues to smoke at least 1 ppd. According to the chart, she has a history of hepatitis C and cirrhosis. However, review of records show negative hepatitis B and C panels in 3/2014 at [facility], and CT scans of abdomen show a normal liver in 5/9/2015 and 11/9/2015. Chest CT scan (7/2017 showed mild diffuse decreased density liver, and mildly dilated 8 mm central biliary tree (but incompletely visualized). She had a screening colonoscopy in 1/2013 by Dr. Chelita Dumont which was normal.  
 
She was being followed by Dr. Doy Baumgarten for complaints of daily headaches and numbness and tingling in her left hand, attributed to probable migraines. She is being treated with gabapentin and was instructed to use a wrist splint for probable carpal tunnel syndrome. Past Medical History:  
Diagnosis Date  Age-related osteoporosis with current pathological fracture 5/21/2017  Body mass index (BMI) less than 16.5   
 13.2  Cigarette smoker  Closed avulsion fracture of greater trochanter of femur ([address] 4/7/2017  COPD (chronic obstructive pulmonary disease) (HCC)  Esophageal stricture 2014  
 s/p XRT for oropharyngeal cancer; requiring GJ tube for nutrition.  Foot fracture, right 12/07/2017  Hypertension  Hypothyroidism  Melanoma ([address]  Mesenteric ischemia (HCC)  Oropharyngeal cancer ([address] 5/2013 [id number] squamous cell carcinoma of posterion pharynx and tonsil s/p XRT and chemo.  Osteopenia  Panic disorder  Peptic ulcer disease  Seizures ([address] 5/21/2017  Stenosis of celiac artery ([address] 2/2013  
 s/p stent  Surgical wound infection   
 right foot  Vitamin D deficiency Past Surgical History:  
Procedure Laterality Date [address] UNLISTED \"ulcer\" surgery  HX ENDOSCOPY  5/9/2014  
 w/ dilation  HX ENDOSCOPY  5/13/2014  
 w/ dilation  HX ENDOSCOPY  5/21  
 with Dilatation  HX ENDOSCOPY  6/4/2014  
 w/ dilation  HX GI    
 HX ORTHOPAEDIC    
 intramedullary chao fixation of the right tiba  HX OTHER SURGICAL skin cancer removal from right shoulder; PEG TUBE  
 HX OTHER SURGICAL Right 02/16/2018  
 right foot surgery  HX TRACHEOSTOMY Current Outpatient Prescriptions Medication Sig  levETIRAcetam (KEPPRA) 100 mg/mL solution TAKE 2 TEASPOONSFULS (10 ML) VIA G-TUBE 2 TIMES A DAY  morphine 10 mg/5 mL oral solution 2.5 or 5 mL by peg tube every 8 hours as needed for pain  cyclobenzaprine (FLEXERIL) 5 mg tablet 1 Tab by Per G Tube route three (3) times daily as needed for Muscle Spasm(s).  gabapentin (NEURONTIN) 300 mg capsule 1 Cap by Per G Tube route three (3) times daily.  levothyroxine (SYNTHROID) 75 mcg tablet TAKE 1/2 TABLET VIA G-TUBE ONCE DAILY BEFORE BREAKFAST  albuterol (PROVENTIL VENTOLIN) 2.5 mg /3 mL (0.083 %) nebulizer solution 3 mL by Nebulization route every four (4) hours as needed for Wheezing.  potassium chloride (KAON 10%) 20 mEq/15 mL solution Take 7.5 mL by mouth daily.  amino acids-protein hydrolys (PROSOURCE NO CARB) 15-60 gram-kcal/30 mL lipk Take 30 mL by mouth daily.  melatonin 3 mg tablet 1 Tab by Per G Tube route nightly as needed. (Patient taking differently: 5 mg by Per G Tube route nightly as needed.)  cholecalciferol, vitamin D3, (VITAMIN D3) 2,000 unit tab 1 Tab by PEG Tube route daily.  omeprazole (PRILOSEC) 2.5 mg suDR delayed release suspension 20 mg by Per G Tube route two (2) times a day.  lidocaine HCl (XYLOCAINE) 3 % topical cream Apply  to affected area two (2) times a day. Indications: WITH DESITIN  
 cyanocobalamin (VITAMIN B12) 500 mcg tablet 500 mcg by PEG Tube route daily. Via peg  multivitamin (THERAGRAN) liquid 10 mL by PEG Tube route daily. No current facility-administered medications for this visit. Allergies and Intolerances: Allergies Allergen Reactions  Megestrol Itching  Percocet [Oxycodone-Acetaminophen] Itching and Hives  Percodan [Oxycodone-Aspirin] Rash and Itching  Plavix [Clopidogrel] Rash and Hives Family History: No FH of breast or colon cancer. Sister had uterine cancer. Family History Problem Relation Age of Onset  Heart Disease Mother Social History: She is  living with her . She has no children. She retired in 2013 from being the  at the [employer]. She  reports that she has been smoking Cigarettes. She has a 21.00 pack-year smoking history. She has never used smokeless tobacco.  
History Alcohol Use  
 0.0 oz/week  0 Standard drinks or equivalent per week Comment: occasional  
 
Immunization History:  
Immunization History Administered Date(s) Administered  Influenza Vaccine (Quad) PF 11/17/2015, 10/13/2016, 09/05/2017, 09/21/2018  Influenza Vaccine PF 10/21/2013, 11/04/2014  Pneumococcal Polysaccharide (PPSV-23) 10/13/2016  Tdap 03/06/2017 Review of Systems: As above included in HPI. Otherwise 11 point review of systems negative including constitutional, skin, HENT, eyes, respiratory, cardiovascular, gastrointestinal, genitourinary, musculoskeletal, endo/heme/aller, neurological. 
 
Physical:  
Vitals:  
BP: 112/68 HR: 96 
WT:  not performed BMI:   not performed Exam:  
Pt appears well; alert and oriented x 3; appropriate affect. HEENT: PERRLA, anicteric, oropharynx clear, no JVD, adenopathy or thyromegaly. No carotid bruits or radiated murmur. Lungs: decreased breath sounds bilaterally, no wheezes, rhonchi, or rales. Heart: regular rate and rhythm. No murmur, rubs, gallops Abdomen: soft, nontender, nondistended, normal bowel sounds, no hepatosplenomegaly or masses. G tube in place. Extremities: Right foot with dressing in place. Clean and dry. Review of Data: 
Labs: Hospital Outpatient Visit on 08/22/2018 Component Date Value Ref Range Status  WBC 08/22/2018 10.5  4.6 - 13.2 K/uL Final  
 RBC 08/22/2018 3.82* 4.20 - 5.30 M/uL Final  
  HGB 08/22/2018 12.7  12.0 - 16.0 g/dL Final  
 HCT 08/22/2018 38.6  35.0 - 45.0 % Final  
 MCV 08/22/2018 101.0* 74.0 - 97.0 FL Final  
 MCH 08/22/2018 33.2  24.0 - 34.0 PG Final  
 MCHC 08/22/2018 32.9  31.0 - 37.0 g/dL Final  
 RDW 08/22/2018 17.5* 11.6 - 14.5 % Final  
 PLATELET 26/12/9794 358* 135 - 420 K/uL Final  
 MPV 08/22/2018 10.3  9.2 - 11.8 FL Final  
 NEUTROPHILS 08/22/2018 84* 40 - 73 % Final  
 LYMPHOCYTES 08/22/2018 7* 21 - 52 % Final  
 MONOCYTES 08/22/2018 7  3 - 10 % Final  
 EOSINOPHILS 08/22/2018 1  0 - 5 % Final  
 BASOPHILS 08/22/2018 1  0 - 2 % Final  
 ABS. NEUTROPHILS 08/22/2018 8.8* 1.8 - 8.0 K/UL Final  
 ABS. LYMPHOCYTES 08/22/2018 0.8* 0.9 - 3.6 K/UL Final  
 ABS. MONOCYTES 08/22/2018 0.7  0.05 - 1.2 K/UL Final  
 ABS. EOSINOPHILS 08/22/2018 0.1  0.0 - 0.4 K/UL Final  
 ABS. BASOPHILS 08/22/2018 0.1  0.0 - 0.1 K/UL Final  
 DF 08/22/2018 AUTOMATED    Final  
 Hemoglobin A1c 08/22/2018 4.3  4.2 - 5.6 % Final  
 Est. average glucose 08/22/2018 Cannot be calculated  mg/dL Final  
 Sodium 08/22/2018 135* 136 - 145 mmol/L Final  
 Potassium 08/22/2018 4.2  3.5 - 5.5 mmol/L Final  
 Chloride 08/22/2018 101  100 - 108 mmol/L Final  
 CO2 08/22/2018 25  21 - 32 mmol/L Final  
 Anion gap 08/22/2018 9  3.0 - 18 mmol/L Final  
 Glucose 08/22/2018 77  74 - 99 mg/dL Final  
 BUN 08/22/2018 6* 7.0 - 18 MG/DL Final  
 Creatinine 08/22/2018 0.60  0.6 - 1.3 MG/DL Final  
 BUN/Creatinine ratio 08/22/2018 10* 12 - 20   Final  
 GFR est AA 08/22/2018 >60  >60 ml/min/1.73m2 Final  
 GFR est non-AA 08/22/2018 >60  >60 ml/min/1.73m2 Final  
 Calcium 08/22/2018 9.3  8.5 - 10.1 MG/DL Final  
 PERIPHERAL SMEAR 08/22/2018 PATHOLOGY REPORT   Final  
 
Health Maintenance: 
Screening:  
 Mammogram: negative (10/2017) PAP smear: s/p PRASHANTH. No further screening. Colorectal: colonoscopy (1/2013) normal. Dr. Hobbs Bull. Due 2023. Depression: none DM (HbA1c/FPG): FPG 82 (2/2018) Hepatitis C: negative (3/2014) at [facility] [place]: none DEXA: osteoporosis (3/2017). IV Reclast given 11/2017. Smoking: resumed smoking 1 ppd Vitamin D: 33.3 (8/2018) Medicare Wellness: 2/14/2018 Impression: 
Patient Active Problem List  
Diagnosis Code  Melanoma right scapula C43.9  COPD (chronic obstructive pulmonary disease) (HCC) J44.9  Carotid artery disease ([address] bilateral moderate I77.9  Celiac artery stenosis, status post stent I77.4  Severe protein-calorie malnutrition ([address] E43  Squamous cell carcinoma of oropharynx, with PEG tube and tracheostomy C10.9  Anemia D64.9  Esophageal stricture K22.2  Vitamin D deficiency E55.9  Acquired hypothyroidism E03.9  Carpal tunnel syndrome, left G56.02  
 Dysphagia, 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  
 S/P percutaneous endoscopic gastrostomy (PEG) tube placement ([address] Z93.1  History of radiation to head and neck region Z92.3  Current smoker F17.200  Essential hypertension I10  Multiple pulmonary nodules R91.8  Closed avulsion fracture of greater trochanter of femur ([address] H23.825C  Seizures ([address] R56.9  Age-related osteoporosis with current pathological fracture M80.00XA  Foot dislocation, right, sequela S93.304S  Controlled substance agreement signed O96.003  Bone infection, ankle/foot ([address] M86.9  Elevated alkaline phosphatase level R74.8  GI bleed due to NSAIDs K92.2, T39.395A  Non-pressure chronic ulcer of other part of right foot with necrosis of bone ([address] L97.514 Plan: 1. Acute sinusitis. Diagnosed last visit with upper respiratory infection with post nasal drainage, cough productive of green sputum, headache, and facial pain. Symptoms were present for 2-3 weeks. No fever or chills, but WBC elevated to 24.2 with left shift (PMNs 92%). Treated with Augmentin for 10 days with improvement.     
2. Non-pressure chronic ulcer of right foot with bone necrosis and poor wound healing. Patient with right great toe dislocation and metatarsal fracture requiring ORIF. Following cast removal, noted deep ulcer with exposed hardware and bone. Removal of hardware revealed underlying osteomyelitis and bone necrosis. Received IV Levaquin and vancomycin for 6 weeks. Repeat wound culture with coag negative staph and candida albicans, treated with Bactrim and fluconazole for 14 days. Wound vac discontinued and now receiving sharp debridement weekly and dressed with lambs wool. Poor nutritional status with severe protein caloric malnutrition contributing to poor wound healing. Also, continuing to smoke. Being followed by Infectious disease, ortho, and plastic surgery at [facility]. Being followed weekly in wound clinic. Continue to follow. 3. Upper GI bleed due to NSAIDS. Presented with dark stool, lightheadedness, and abdominal pain. Initial Hb 7.9 and endoscopic evaluation showed distal esophagitis as most likely source for bleed. Treated with IV Protonix, and received 1 U PRBCs with IV iron (Venofer) infusion. Repeat Hb normal at 12.5/ Hct 39.0. Instructed to avoid NSAIDS. Will continue to follow. 4. Seizures. Unclear but concerning as presented with multiple witnessed episodes in ED thought to represent status epilepticus requiring loading with Keppra to terminate. Has had no recurrence of seizures since Keppra initiated. Head CT scan was negative, but she has been unable to have brain MRI due to anxiety. Evaluated by Dr. Arielle Groves, and repeat EEG sleep deprived normal. Continues on Keprra. 5. S/P nondisplaced fracture of right femur, greater trochanter. No further difficulty, but non weight bearing due to foot. Follow. 6. Oropharyngeal carcinoma s/p XRT/chemo. Currently in remission. Recent PET scan and neck/chest CT scans with stable pulmonary nodules, and otherwise without clinical evidence of active disease. She is being followed closely by Dr. Hugo Vasquez at [facility] and Dr. Oumou Astorga. 7. Esophageal stricture. Currently receiving all nutrition via G-tube. On intermittent feeds. Weight decreased eight pounds since 9/2017. Severe protein calorie malnutrition. Receiving ProSource protein supplement. Not able to weigh patient as remains non weight bearing. Thus, difficult to determine if making any progress. Follow. 8. Osteoporosis. Significant progression of disease since prior exam in 2010, particularly in right femoral neck. Now with fracture of right proximal femur following mechanical fall. Unclear if safe to treat with biphosphonates given prior head and neck irradiation. Concern for possible increase risk of osteonecrosis with biphosphonate or denosumab use. Evaluated by Dr. Colonel Dominguez and risk of fracture felt to be higher than risk of osteonecrosis. Received dose of IV Reclast with plans for repeat annually. Due 11/2018. Continue calcium and Vitamin D. 
9. Elevated LFT's. Alkaline phosphatase level remains significantly increased, but improved and may be due to chronic osteomyelitis. Transaminases have normalized, but GGT remains elevated. Review of chest CT scan in 1/2017 and 7/2017 showed hepatic steatosis and mildly dilated central biliary duct without intrahepatic ductal dilatation (but not completely visualized). Keppra also reported to cause increase in LFT's. Hepatitis panel, ferritin, iron studies, ceruloplasmin, AMA, and ASMA were all negative. GGT elevated confirming hepatic source of elevated alkaline phosphatase. Hepatic ultrasound with evidence of increased hepatic echotexture suggestive of nonspecific hepatocellular pathology such as steatosis. Follow. 10. Hypothyroidism. TSH (8/2018) with evidence of adequate replacement on current Synthroid dose. Continue to follow. 11. H/O mesenteric ischemia. 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 Being followed by Dr. Vanessa Hagan. Continue to follow. 12. Carotid stenoses, bilateral. Prior duplex scan revealing severe stenoses bilaterally, but angiogram in 7/2016 by Dr. Kendall Mata did not confirm this. Difficulty with duplex scan most likely due to prior neck irradiation. However, most recent duplex in 6/2017 showing only moderate stenosis. Follow. 13. COPD. Currently well controlled. On Flovent Diskus but reports not needing to use Duoneb nebulizers. States that no longer following with pulmonary and not wishing to currently. Continue to follow. 14. Severe protein calorie malnutrition. Not able to increase number of cans of 2 dez HN due to diarrhea. Reports poor appetite. Suggested trying smaller volume but more frequent feeds to see if can tolerate better. Will attempt. 15. Carpal tunnel syndrome/ headaches. Being followed by Dr. Donal Garcia. On gabapentin and using a left arm splint. Follow. 16. Multiple pulmonary nodules. Stable on most recent CT scan in 1/2017. Being followed by Dr. Roberta Orlando. 17. Smoking cessation. Discussed at length with the patient, including benefits of improved lung function as well as decreased risk of cardiovascular disease and cancer. Medication and non-pharmacologic options explored. Trial of Wellbutrin after last visit, but developed side effects. Discussed other options, including nicotine patches, but patient not currently wishing to stop. Will continue to follow. Total time spent on topic 6 minutes. 18. Health maintenance. Received Pneumovax. Will give influenza vaccine today. Would not give Shingrix currently given multiple other issues . Other immunizations up to date. Mammogram up to date. Vitamin D level has been normal. To continue maintenance dose supplement. Colorectal cancer screening normal in 1/2013. Total time: 40 minutes spent with the patient in face-to-face consultation of which greater than 50% was spent on counseling, answering questions and/or coordination of care.  Complex medical review and management performed. Patient understands recommendations and agrees with plan. Follow-up in 3 months. [facility] Outpatient Visit on 09/21/2018 Component Date Value Ref Range Status  WBC 09/21/2018 15.2* 4.6 - 13.2 K/uL Final  
 RBC 09/21/2018 3.91* 4.20 - 5.30 M/uL Final  
 HGB 09/21/2018 12.9  12.0 - 16.0 g/dL Final  
 HCT 09/21/2018 39.8  35.0 - 45.0 % Final  
 MCV 09/21/2018 101.8* 74.0 - 97.0 FL Final  
 MCH 09/21/2018 33.0  24.0 - 34.0 PG Final  
 MCHC 09/21/2018 32.4  31.0 - 37.0 g/dL Final  
 RDW 09/21/2018 14.1  11.6 - 14.5 % Final  
 PLATELET 30/06/0696 946  135 - 420 K/uL Final  
 MPV 09/21/2018 11.0  9.2 - 11.8 FL Final  
 NEUTROPHILS 09/21/2018 89* 40 - 73 % Final  
 LYMPHOCYTES 09/21/2018 6* 21 - 52 % Final  
 MONOCYTES 09/21/2018 4  3 - 10 % Final  
 EOSINOPHILS 09/21/2018 0  0 - 5 % Final  
 BASOPHILS 09/21/2018 1  0 - 2 % Final  
 ABS. NEUTROPHILS 09/21/2018 13.6* 1.8 - 8.0 K/UL Final  
 ABS. LYMPHOCYTES 09/21/2018 0.8* 0.9 - 3.6 K/UL Final  
 ABS. MONOCYTES 09/21/2018 0.6  0.05 - 1.2 K/UL Final  
 ABS. EOSINOPHILS 09/21/2018 0.0  0.0 - 0.4 K/UL Final  
 ABS.  BASOPHILS 09/21/2018 0.1  0.0 - 0.1 K/UL Final  
 DF 09/21/2018 AUTOMATED    Final  
 Sodium 09/21/2018 134* 136 - 145 mmol/L Final  
 Potassium 09/21/2018 4.7  3.5 - 5.5 mmol/L Final  
 Chloride 09/21/2018 102  100 - 108 mmol/L Final  
 CO2 09/21/2018 23  21 - 32 mmol/L Final  
 Anion gap 09/21/2018 9  3.0 - 18 mmol/L Final  
 Glucose 09/21/2018 99  74 - 99 mg/dL Final  
 BUN 09/21/2018 7  7.0 - 18 MG/DL Final  
 Creatinine 09/21/2018 0.70  0.6 - 1.3 MG/DL Final  
 BUN/Creatinine ratio 09/21/2018 10* 12 - 20   Final  
 GFR est AA 09/21/2018 >60  >60 ml/min/1.73m2 Final  
 GFR est non-AA 09/21/2018 >60  >60 ml/min/1.73m2 Final  
 Calcium 09/21/2018 9.3  8.5 - 10.1 MG/DL Final  
 Bilirubin, total 09/21/2018 0.3  0.2 - 1.0 MG/DL Final  
 ALT (SGPT) 09/21/2018 17  13 - 56 U/L Final  
  AST (SGOT) 09/21/2018 32  15 - 37 U/L Final  
 Alk. phosphatase 09/21/2018 319* 45 - 117 U/L Final  
 Protein, total 09/21/2018 7.2  6.4 - 8.2 g/dL Final  
 Albumin 09/21/2018 2.4* 3.4 - 5.0 g/dL Final  
 Globulin 09/21/2018 4.8* 2.0 - 4.0 g/dL Final  
 A-G Ratio 09/21/2018 0.5* 0.8 - 1.7   Final  
 Vitamin D 25-Hydroxy 09/21/2018 46.2  30 - 100 ng/mL Final  
 
Reviewed labs from visit. CBC showing improvement in WBC although remains elevated with 89% neutrophils. Will need to watch closely for signs of infection. Normal renal function with creatinine 0.70/ eGFR >60. Liver function tests normal except for elevated alkaline phosphatase, which is related to osteoporosis and foot wound. Albumin in low at 2.4, indicative of poor nutrition. Please ask her to try to increase intake as discussed a visit.  
Vitamin D level normal.

## 2018-09-24 NOTE — TELEPHONE ENCOUNTER
Reviewed labs from visit. Please let the patient know the following:    CBC showing improvement in WBC count although remains elevated with 89% neutrophils. Will need to watch closely for signs of infection. Normal renal function with creatinine 0.70/ eGFR >60. Liver function tests normal except for elevated alkaline phosphatase, which is related to osteoporosis and foot wound. Albumin in low at 2.4, indicative of poor nutrition. Please ask her to try to increase intake as discussed a visit.     Vitamin D level normal.

## 2018-09-27 NOTE — PROGRESS NOTES
Ms. Maryfrances Dakins is here today for what was supposed to be a six-month follow-up. She is on a regimen yearly for follow-up on her celiac artery disease, and then every 6 months for her carotid disease. She actually had her ultrasound in July, but has had delay in being able to follow-up in the office due to some setbacks after a fall. She had a fracture with hardware then removal of hardware then a wound, so it has been a very busy summer for her. She has had a lot of follow-up with Ortho and had to go to wound care clinic, which she was just released from yesterday in fact. She is hoping to get set up for home health to resume some physical therapy for strengthening and conditioning so she can get back to walking again. She of course has a PEG tube for feedings, due to her history of radiation and inability to swallow. Her PCP is recently suggested increased feeding frequency but smaller amounts. I told her to even inquire once physical therapy at home is established to see if there is even some nutritional support offered to home health services to be able to just reevaluate her nutritional status and offer better supplements, as she will need that for the increased efforts with therapy. Of note though, I did review that her carotids were stable. She has low range of moderate disease on the right, and then no significant stenosis on the left. We will arrange for the routine six-month follow-up for that as well as her celiac artery stent in January 2019.   She did have leg arterial testing in conjunction with this wound care and healing, and those results were within normal limits

## 2018-09-27 NOTE — PROGRESS NOTES
1. Have you been to an emergency room or urgent care clinic since your last visit? no    Hospitalized since your last visit? If yes, where, when, and reason for visit? no  2. Have you seen or consulted any other health care providers outside of the [facility] since your last visit including any procedures, health maintenance items.  If yes, where, when and reason for visit? no

## 2018-09-27 NOTE — MR AVS SNAPSHOT
[address] [facility] [address] 
 
 
 [address] [place], [place] [address] [address] [facility] [address] 
[phone] Patient: Liam Shepard MRN: [id number] Visit Information Date & Time Provider Department Dept. Phone Encounter #  
 9/27/2018  9:30 AM Frank Fall, 82 [facility] [id number] Your Appointments 1/2/2019  9:30 AM  
LAB with [facility] NURSE VISIT [facility] ([facility]) Appt Note: labs [address], [address] [place] [address] [address]  
  
   
 [address], [address]  
  
    
 1/9/2019 11:00 AM  
Office Visit with Vince Goldberg, MD  
[facility]) Appt Note: ov per sarris [address], [address] [facility] [address]  
  
   
 [address] [facility], [address]  
  
    
 2/1/2019  9:00 AM  
PROCEDURE with [facility] ([facility]) Appt Note: Celiac Stent. 5 months Keyur Luster [address] [facility] [address] [facility] [address]  
[phone] [address] [place], [place] [id number]  
  
    
 2/1/2019 10:00 AM  
PROCEDURE with [facility] 2 [facility] ([facility]) Appt Note: CV 5 months Keyur Luster [address] [facility] [address] [facility] [address]  
[phone] [address] [place], [place] [id number]  
  
    
 2/8/2019 10:45 AM  
Follow Up with Claus Munoz MD  
[address] [facility]) Appt Note: 5 months fup with studies. Per Melissa Henderosn to see [address] [facility] [address]  
[phone] [address] [facility], [facility] [address] [facility] [address] Upcoming Health Maintenance Date Due Shingrix Vaccine Age 50> (1 of 2) 12/2/2007 PAP AKA CERVICAL CYTOLOGY 9/27/2019* Pneumococcal 19-64 Highest Risk (3 of 3 - PCV13) 2/28/2023* MEDICARE YEARLY EXAM 2/15/2019 BREAST CANCER SCRN MAMMOGRAM 10/18/2019 COLONOSCOPY 1/10/2023 DTaP/Tdap/Td series (2 - Td) 3/6/2027 *Topic was postponed. The date shown is not the original due date. Allergies as of 9/27/2018  Review Complete On: 9/27/2018 By: Rehana Del Castillo Severity Noted Reaction Type Reactions Megestrol Medium 02/14/2018    Itching Percocet [Oxycodone-acetaminophen]  12/29/2013    Itching, Hives Percodan [Oxycodone-aspirin]  05/21/2014    Rash, Itching Plavix [Clopidogrel]  04/10/2013    Rash, Hives Current Immunizations  Reviewed on 9/21/2018 Name Date Influenza Vaccine (Quad) PF 9/21/2018  2:40 PM, 9/5/2017 10:30 AM, 10/13/2016  3:26 PM, 11/17/2015  3:55 PM  
 Influenza Vaccine PF 11/4/2014, 10/21/2013  1:03 PM  
 Pneumococcal Polysaccharide (PPSV-23) 10/13/2016  3:52 PM  
 Tdap 3/6/2017 Not reviewed this visit You Were Diagnosed With   
  
 Codes Comments Bilateral [facility]    -  Primary ICD-10-CM: I77.9 ICD-9-CM: 447.9 Celiac artery stenosis (HCC)     ICD-10-CM: I77.4 ICD-9-CM: 423. 4 Vitals BP Pulse Resp Height(growth percentile) Weight(growth percentile) BMI  
 100/70 (BP 1 Location: Left arm, BP Patient Position: Sitting) 68 17 5' 2\" (1.575 m) 75 lb (34 kg) 13.72 kg/m2 OB Status Smoking Status Postmenopausal Current Every Day Smoker Vitals History BMI and BSA Data Body Mass Index Body Surface Area 13.72 kg/m 2 1.22 m 2 Preferred Pharmacy Pharmacy Name Phone [address] [place] [address], [address] [place] [address] [phone] Your Updated Medication List  
  
   
This list is accurate as of 9/27/18  9:58 AM.  Always use your most recent med list.  
  
  
  
  
 albuterol 2.5 mg /3 mL (0.083 %) nebulizer solution Commonly known as:  PROVENTIL VENTOLIN  
 3 mL by Nebulization route every four (4) hours as needed for Wheezing. cyanocobalamin 500 mcg tablet Commonly known as:  VITAMIN B12  
500 mcg by PEG Tube route daily. Via peg  
  
 cyclobenzaprine 5 mg tablet Commonly known as:  FLEXERIL  
1 Tab by Per G Tube route three (3) times daily as needed for Muscle Spasm(s). gabapentin 300 mg capsule Commonly known as:  NEURONTIN  
1 Cap by Per G Tube route three (3) times daily. levETIRAcetam 100 mg/mL solution Commonly known as:  KEPPRA TAKE 2 TEASPOONSFULS (10 ML) VIA G-TUBE 2 TIMES A DAY  
  
 levothyroxine 75 mcg tablet Commonly known as:  SYNTHROID  
TAKE 1/2 TABLET VIA G-TUBE ONCE DAILY BEFORE BREAKFAST  
  
 lidocaine HCl 3 % topical cream  
Commonly known as:  XYLOCAINE Apply  to affected area two (2) times a day. Indications: WITH DESITIN  
  
 melatonin 3 mg tablet 1 Tab by Per G Tube route nightly as needed. morphine 10 mg/5 mL oral solution 2.5 or 5 mL by peg tube every 8 hours as needed for pain  
  
 multivitamin liquid Commonly known as:  THERAGRAN  
10 mL by PEG Tube route daily. omeprazole 2.5 mg Sudr delayed release suspension Commonly known as:  PRILOSEC  
20 mg by Per G Tube route two (2) times a day. potassium chloride 20 mEq/15 mL solution Commonly known as:  KAON 10% Take 7.5 mL by mouth daily. PROSOURCE NO CARB 15-60 gram-kcal/30 mL Lipk Generic drug:  amino acids-protein hydrolys Take 30 mL by mouth daily. VITAMIN D3 2,000 unit Tab Generic drug:  cholecalciferol (vitamin D3) 1 Tab by PEG Tube route daily. To-Do List   
 09/27/2018 Vascular/US:  DUPLEX CAROTID BILATERAL   
  
 01/27/2019 Vascular/US:  DUPLEX ABD VISC ART ORGANS COMPLETE Introducing \[facility] [place] [facility]\"" & HEALTH SERVICES! Christen Martinez introduces [facility] patient portal. Now you can access parts of your medical record, email your doctor's office, and request medication refills online. 1. In your internet browser, go to [email or web address] 2. Click on the First Time User? Click Here link in the Sign In box. You will see the New Member Sign Up page. 3. Enter your [employer] Access Code exactly as it appears below. You will not need to use this code after youve completed the sign-up process. If you do not sign up before the expiration date, you must request a new code. · Winster Access Code: [email or web address] Expires: 12/3/2018 10:02 AM 
 
4. Enter the last four digits of your Social Security Number (xxxx) and Date of Birth (mm/dd/yyyy) as indicated and click Submit. You will be taken to the next sign-up page. 5. Create a [employer] ID. This will be your Winster login ID and cannot be changed, so think of one that is secure and easy to remember. 6. Create a Winster password. You can change your password at any time. 7. Enter your Password Reset Question and Answer. This can be used at a later time if you forget your password. 8. Enter your e-mail address. You will receive e-mail notification when new information is available in [address] 9. Click Sign Up. You can now view and download portions of your medical record. 10. Click the Download Summary menu link to download a portable copy of your medical information. If you have questions, please visit the Frequently Asked Questions section of the Winster website. Remember, Winster is NOT to be used for urgent needs. For medical emergencies, dial 911. Now available from your iPhone and Android! Please provide this summary of care documentation to your next provider. Your primary care clinician is listed as [facility]. If you have any questions after today's visit, please call [phone].

## 2018-11-19 NOTE — TELEPHONE ENCOUNTER
[facility]18 for a 6 day supply  Last OV 9/21/18  Reviewed report generated by the [facility]. Does not demonstrate aberrancies or inconsistencies with regard to the prescribing of controlled medications to this patient by other providers.

## 2018-11-29 PROBLEM — E87.20 METABOLIC ACIDOSIS: Status: ACTIVE | Noted: 2018-01-01

## 2018-11-29 PROBLEM — A41.9 SEPSIS (HCC): Status: ACTIVE | Noted: 2018-01-01

## 2018-11-29 PROBLEM — R56.9 SEIZURE (HCC): Status: ACTIVE | Noted: 2018-01-01

## 2018-11-29 PROBLEM — T68.XXXA HYPOTHERMIA: Status: ACTIVE | Noted: 2018-01-01

## 2018-11-29 PROBLEM — E87.1 HYPONATREMIA: Status: ACTIVE | Noted: 2018-01-01

## 2018-11-29 PROBLEM — R41.89 UNRESPONSIVE STATE: Status: ACTIVE | Noted: 2018-01-01

## 2018-11-29 PROBLEM — J96.90 RESPIRATORY FAILURE (HCC): Status: ACTIVE | Noted: 2018-01-01

## 2018-11-29 NOTE — TELEPHONE ENCOUNTER
Last Visit: 2018 with MD Norma Pearson  Next Appointment: 2019 with MD Norma Pearson  Previous Refill Encounter(s): 10/10/2018 per MD Norma Pearson #25 with 1 refill    Requested Prescriptions     Pending Prescriptions Disp Refills    cyclobenzaprine (FLEXERIL) 5 mg tablet 25 Tab 1     Si Tab by Per G Tube route three (3) times daily as needed for Muscle Spasm(s).

## 2019-01-07 ENCOUNTER — TELEPHONE (OUTPATIENT)
Dept: INTERNAL MEDICINE CLINIC | Age: 64
End: 2019-01-07

## 2019-01-07 NOTE — TELEPHONE ENCOUNTER
Patient's  dropped off some forms to be filled out. Stating there was a clause in her life insurance that stated if she was on disability she didn't have to pay. They need this form completed so he Olga Lidia Aidee Robles can be reimbursed. Please call Mr. Miguel once completed and he will .

## 2019-01-08 NOTE — TELEPHONE ENCOUNTER
Called the  of the  patient and verified full name and date of birth. He states the  patient went on disability for over 6 months and she had an life insurance policy with [employer]. He states the form is for him to get a back payment for when he paid for the policy while she was on disability. He states that he was told by the insurance company agent to \"fill it out as though she is alive\". He gave the number to the  which is, ([address] [facility]. I will call him in a few.

## 2019-01-08 NOTE — TELEPHONE ENCOUNTER
Called and left a message on the home phone for Mr. Miguel to give us a call back regarding the paperwork he dropped off.

## 2019-01-09 NOTE — TELEPHONE ENCOUNTER
Called and left a message on Didival Beltran, [employer], phone to give us a call back regarding the paperwork that was given to the  patient's  for us to fill out.

## 2019-01-14 NOTE — TELEPHONE ENCOUNTER
Unfortunately, I was not caring for the patient at that time since I assumed her care in 4/2016. Will discuss with Barbara Sandoval on how best to proceed. Thanks.

## 2019-01-14 NOTE — TELEPHONE ENCOUNTER
Called and left a message with the  Ms. Efren Huber at [employer]. I informed her, per Dr. William Zavala, that we aren't able to fill out the paperwork due to it being as though the  patient is still alive. The paperwork is for a person who is still alive. She verbalized understanding and stated that Magdalena Cunha will give me a call back regarding this issue.

## 2019-01-14 NOTE — TELEPHONE ENCOUNTER
Called back Mr. Dilma Villanueva, [address], and informed him that due to the wording on the paperwork we are unable to fill it out. And we can not write it as though she, the  patient, is still alive, due to possible fraud. He verbalized understanding and then stated that the paperwork should be filled out and stating the condition of the patient, 4-5 years ago, based off of the  patient's condition at that time. Mr. Corie Sheridan, stated that the  of the  patient had paid into the insurance policy during the time the  patient was disabled. And that the  was needing to get that money back and claim for the current insurance policy paid out to him. Mr. Corie Sheridan also stated that we would need to write on the forms the condition the  patient was in right before she started out as being deemed disabled, and it would let the insurance company know if she met certain conditions. All the information would be about the patient at that time of the beginning of the disability. Dr. Tee Lama, please advise.

## 2019-01-30 ENCOUNTER — TELEPHONE (OUTPATIENT)
Dept: INTERNAL MEDICINE CLINIC | Age: 64
End: 2019-01-30

## 2021-06-07 NOTE — TELEPHONE ENCOUNTER
Called and spoke with . Reassured him that I was aware of the events that had transpired as I had received the notes from [facility]. He states that his wife is doing better and is undergoing physical therapy and rehab at [address] [place] [address],[address] [facility] to help her regain the ability to ambulate. Discussed appointment with Dr. Nivia Peters to address treatment for her osteoporosis. Will need to wait for discharge from [address] [place] [address],[address]. EKG

## 2025-03-05 NOTE — TELEPHONE ENCOUNTER
[facility] as 2018 for a 6 d/s. There appears to be no inconsistencies in regards to the prescribing of this medication.      Last Visit: 2018 with TALHA Kim    Next Appointment: 2018 with MD Raj Landon   Previous Refill Encounters: 2018 per MD Martinez 100 ml    Requested Prescriptions     Pending Prescriptions Disp Refills    morphine 10 mg/5 mL oral solution 100 mL 0     Si.5ml or 5ml by peg tube every 8 hours as needed for pain
lvm rx is at the 
Admission Reconciliation is Completed  Discharge Reconciliation is Completed